# Patient Record
Sex: FEMALE | Race: BLACK OR AFRICAN AMERICAN | Employment: FULL TIME | URBAN - METROPOLITAN AREA
[De-identification: names, ages, dates, MRNs, and addresses within clinical notes are randomized per-mention and may not be internally consistent; named-entity substitution may affect disease eponyms.]

---

## 2017-07-13 ENCOUNTER — OFFICE VISIT (OUTPATIENT)
Dept: ORTHOPEDIC SURGERY | Age: 37
End: 2017-07-13

## 2017-07-13 VITALS
TEMPERATURE: 97.3 F | BODY MASS INDEX: 35 KG/M2 | DIASTOLIC BLOOD PRESSURE: 82 MMHG | WEIGHT: 223 LBS | HEIGHT: 67 IN | HEART RATE: 90 BPM | SYSTOLIC BLOOD PRESSURE: 112 MMHG

## 2017-07-13 DIAGNOSIS — M25.561 RIGHT KNEE PAIN, UNSPECIFIED CHRONICITY: Primary | ICD-10-CM

## 2017-07-13 DIAGNOSIS — M17.11 ARTHRITIS OF RIGHT KNEE: ICD-10-CM

## 2017-07-13 PROCEDURE — 20610 DRAIN/INJ JOINT/BURSA W/O US: CPT | Performed by: PHYSICIAN ASSISTANT

## 2017-07-13 PROCEDURE — 73560 X-RAY EXAM OF KNEE 1 OR 2: CPT | Performed by: PHYSICIAN ASSISTANT

## 2017-07-13 PROCEDURE — 99202 OFFICE O/P NEW SF 15 MIN: CPT | Performed by: PHYSICIAN ASSISTANT

## 2017-07-18 PROBLEM — M17.11 ARTHRITIS OF RIGHT KNEE: Status: ACTIVE | Noted: 2017-07-18

## 2017-08-16 ENCOUNTER — OFFICE VISIT (OUTPATIENT)
Dept: ORTHOPEDIC SURGERY | Age: 37
End: 2017-08-16

## 2017-08-16 VITALS
DIASTOLIC BLOOD PRESSURE: 85 MMHG | HEART RATE: 77 BPM | WEIGHT: 223 LBS | HEIGHT: 67 IN | BODY MASS INDEX: 35 KG/M2 | SYSTOLIC BLOOD PRESSURE: 119 MMHG

## 2017-08-16 DIAGNOSIS — M17.11 ARTHRITIS OF RIGHT KNEE: ICD-10-CM

## 2017-08-16 DIAGNOSIS — M17.12 ARTHRITIS OF LEFT KNEE: Primary | ICD-10-CM

## 2017-08-16 PROCEDURE — 99213 OFFICE O/P EST LOW 20 MIN: CPT | Performed by: PHYSICIAN ASSISTANT

## 2017-08-16 PROCEDURE — 20610 DRAIN/INJ JOINT/BURSA W/O US: CPT | Performed by: PHYSICIAN ASSISTANT

## 2017-09-14 ENCOUNTER — OFFICE VISIT (OUTPATIENT)
Dept: ORTHOPEDIC SURGERY | Age: 37
End: 2017-09-14

## 2017-09-14 VITALS
HEIGHT: 67 IN | TEMPERATURE: 98 F | SYSTOLIC BLOOD PRESSURE: 131 MMHG | BODY MASS INDEX: 35 KG/M2 | HEART RATE: 91 BPM | DIASTOLIC BLOOD PRESSURE: 81 MMHG | WEIGHT: 223 LBS

## 2017-09-14 DIAGNOSIS — M17.11 ARTHRITIS OF RIGHT KNEE: Primary | ICD-10-CM

## 2017-09-14 PROCEDURE — 20610 DRAIN/INJ JOINT/BURSA W/O US: CPT | Performed by: PHYSICIAN ASSISTANT

## 2019-02-05 ENCOUNTER — HOSPITAL ENCOUNTER (EMERGENCY)
Age: 39
Discharge: HOME | End: 2019-02-05
Payer: COMMERCIAL

## 2019-02-05 VITALS
SYSTOLIC BLOOD PRESSURE: 125 MMHG | HEART RATE: 95 BPM | TEMPERATURE: 99.9 F | OXYGEN SATURATION: 100 % | DIASTOLIC BLOOD PRESSURE: 77 MMHG | WEIGHT: 224.21 LBS | HEIGHT: 67 IN | RESPIRATION RATE: 18 BRPM | BODY MASS INDEX: 35.19 KG/M2

## 2019-02-05 DIAGNOSIS — J06.9 VIRAL URI: Primary | ICD-10-CM

## 2019-02-05 LAB — S PYO AG THROAT QL: NEGATIVE

## 2019-02-05 PROCEDURE — 87880 STREP A ASSAY W/OPTIC: CPT

## 2019-02-05 PROCEDURE — 99283 EMERGENCY DEPT VISIT LOW MDM: CPT

## 2019-02-05 PROCEDURE — 87081 CULTURE SCREEN ONLY: CPT

## 2019-02-05 RX ORDER — IBUPROFEN 600 MG/1
600 TABLET ORAL EVERY 6 HOURS PRN
Qty: 20 TABLET | Refills: 0 | Status: SHIPPED | OUTPATIENT
Start: 2019-02-05 | End: 2019-10-17

## 2019-02-05 RX ORDER — PSEUDOEPHEDRINE HYDROCHLORIDE 30 MG/1
60 TABLET ORAL EVERY 6 HOURS PRN
Qty: 30 TABLET | Refills: 0 | Status: SHIPPED | OUTPATIENT
Start: 2019-02-05 | End: 2019-10-17

## 2019-02-05 RX ORDER — ACETAMINOPHEN 500 MG
1000 TABLET ORAL EVERY 6 HOURS PRN
Qty: 30 TABLET | Refills: 0 | Status: SHIPPED | OUTPATIENT
Start: 2019-02-05 | End: 2019-10-17

## 2019-02-05 RX ORDER — ATORVASTATIN CALCIUM 20 MG/1
20 TABLET, FILM COATED ORAL
COMMUNITY
Start: 2018-06-29 | End: 2019-10-17

## 2019-02-05 ASSESSMENT — PAIN DESCRIPTION - PAIN TYPE
TYPE: ACUTE PAIN
TYPE: ACUTE PAIN

## 2019-02-05 ASSESSMENT — PAIN - FUNCTIONAL ASSESSMENT: PAIN_FUNCTIONAL_ASSESSMENT: 0-10

## 2019-02-05 ASSESSMENT — PAIN DESCRIPTION - DESCRIPTORS
DESCRIPTORS: ACHING
DESCRIPTORS: ACHING

## 2019-02-05 ASSESSMENT — PAIN DESCRIPTION - LOCATION
LOCATION: GENERALIZED
LOCATION: GENERALIZED

## 2019-02-05 ASSESSMENT — PAIN SCALES - GENERAL
PAINLEVEL_OUTOF10: 8
PAINLEVEL_OUTOF10: 5

## 2019-02-07 LAB — S PYO THROAT QL CULT: NORMAL

## 2019-04-24 ENCOUNTER — HOSPITAL ENCOUNTER (EMERGENCY)
Age: 39
Discharge: HOME | End: 2019-04-25
Attending: EMERGENCY MEDICINE
Payer: COMMERCIAL

## 2019-04-24 DIAGNOSIS — L02.31 ABSCESS OF BUTTOCK: Primary | ICD-10-CM

## 2019-04-24 DIAGNOSIS — L03.317 CELLULITIS OF BUTTOCK: ICD-10-CM

## 2019-04-24 LAB
A/G RATIO: 0.9 (ref 1.1–2.2)
ALBUMIN SERPL-MCNC: 3.8 G/DL (ref 3.4–5)
ALP BLD-CCNC: 123 U/L (ref 40–129)
ALT SERPL-CCNC: 15 U/L (ref 10–40)
ANION GAP SERPL CALCULATED.3IONS-SCNC: 11 MMOL/L (ref 3–16)
AST SERPL-CCNC: 16 U/L (ref 15–37)
BASOPHILS ABSOLUTE: 0.1 K/UL (ref 0–0.2)
BASOPHILS RELATIVE PERCENT: 0.7 %
BILIRUB SERPL-MCNC: <0.2 MG/DL (ref 0–1)
BUN BLDV-MCNC: 15 MG/DL (ref 7–20)
CALCIUM SERPL-MCNC: 9 MG/DL (ref 8.3–10.6)
CHLORIDE BLD-SCNC: 102 MMOL/L (ref 99–110)
CO2: 23 MMOL/L (ref 21–32)
CREAT SERPL-MCNC: 0.8 MG/DL (ref 0.6–1.1)
EOSINOPHILS ABSOLUTE: 0.3 K/UL (ref 0–0.6)
EOSINOPHILS RELATIVE PERCENT: 1.9 %
GFR AFRICAN AMERICAN: >60
GFR NON-AFRICAN AMERICAN: >60
GLOBULIN: 4.1 G/DL
GLUCOSE BLD-MCNC: 100 MG/DL (ref 70–99)
HCT VFR BLD CALC: 35.8 % (ref 36–48)
HEMOGLOBIN: 11.7 G/DL (ref 12–16)
LACTIC ACID: 1.1 MMOL/L (ref 0.4–2)
LYMPHOCYTES ABSOLUTE: 3.8 K/UL (ref 1–5.1)
LYMPHOCYTES RELATIVE PERCENT: 21.7 %
MCH RBC QN AUTO: 31.3 PG (ref 26–34)
MCHC RBC AUTO-ENTMCNC: 32.7 G/DL (ref 31–36)
MCV RBC AUTO: 95.8 FL (ref 80–100)
MONOCYTES ABSOLUTE: 1 K/UL (ref 0–1.3)
MONOCYTES RELATIVE PERCENT: 5.7 %
NEUTROPHILS ABSOLUTE: 12.3 K/UL (ref 1.7–7.7)
NEUTROPHILS RELATIVE PERCENT: 70 %
PDW BLD-RTO: 12.4 % (ref 12.4–15.4)
PLATELET # BLD: ABNORMAL K/UL (ref 135–450)
PMV BLD AUTO: ABNORMAL FL (ref 5–10.5)
POTASSIUM REFLEX MAGNESIUM: 3.7 MMOL/L (ref 3.5–5.1)
RBC # BLD: 3.73 M/UL (ref 4–5.2)
SODIUM BLD-SCNC: 136 MMOL/L (ref 136–145)
TOTAL PROTEIN: 7.9 G/DL (ref 6.4–8.2)
WBC # BLD: 17.5 K/UL (ref 4–11)

## 2019-04-24 PROCEDURE — 87070 CULTURE OTHR SPECIMN AEROBIC: CPT

## 2019-04-24 PROCEDURE — 36415 COLL VENOUS BLD VENIPUNCTURE: CPT

## 2019-04-24 PROCEDURE — 87040 BLOOD CULTURE FOR BACTERIA: CPT

## 2019-04-24 PROCEDURE — 83605 ASSAY OF LACTIC ACID: CPT

## 2019-04-24 PROCEDURE — 96361 HYDRATE IV INFUSION ADD-ON: CPT

## 2019-04-24 PROCEDURE — 87205 SMEAR GRAM STAIN: CPT

## 2019-04-24 PROCEDURE — 96374 THER/PROPH/DIAG INJ IV PUSH: CPT

## 2019-04-24 PROCEDURE — 2580000003 HC RX 258: Performed by: NURSE PRACTITIONER

## 2019-04-24 PROCEDURE — 85025 COMPLETE CBC W/AUTO DIFF WBC: CPT

## 2019-04-24 PROCEDURE — 6360000002 HC RX W HCPCS: Performed by: NURSE PRACTITIONER

## 2019-04-24 PROCEDURE — 99283 EMERGENCY DEPT VISIT LOW MDM: CPT

## 2019-04-24 PROCEDURE — 4500000023 HC ED LEVEL 3 PROCEDURE

## 2019-04-24 PROCEDURE — 87801 DETECT AGNT MULT DNA AMPLI: CPT

## 2019-04-24 PROCEDURE — 80053 COMPREHEN METABOLIC PANEL: CPT

## 2019-04-24 PROCEDURE — 96375 TX/PRO/DX INJ NEW DRUG ADDON: CPT

## 2019-04-24 RX ORDER — ONDANSETRON 2 MG/ML
4 INJECTION INTRAMUSCULAR; INTRAVENOUS ONCE
Status: COMPLETED | OUTPATIENT
Start: 2019-04-24 | End: 2019-04-24

## 2019-04-24 RX ORDER — 0.9 % SODIUM CHLORIDE 0.9 %
1000 INTRAVENOUS SOLUTION INTRAVENOUS ONCE
Status: COMPLETED | OUTPATIENT
Start: 2019-04-24 | End: 2019-04-24

## 2019-04-24 RX ORDER — CEPHALEXIN 250 MG/1
500 CAPSULE ORAL ONCE
Status: COMPLETED | OUTPATIENT
Start: 2019-04-24 | End: 2019-04-25

## 2019-04-24 RX ORDER — CEPHALEXIN 500 MG/1
500 CAPSULE ORAL 4 TIMES DAILY
Qty: 28 CAPSULE | Refills: 0 | Status: SHIPPED | OUTPATIENT
Start: 2019-04-24 | End: 2019-05-01

## 2019-04-24 RX ORDER — MORPHINE SULFATE 4 MG/ML
4 INJECTION, SOLUTION INTRAMUSCULAR; INTRAVENOUS ONCE
Status: COMPLETED | OUTPATIENT
Start: 2019-04-24 | End: 2019-04-24

## 2019-04-24 RX ORDER — SULFAMETHOXAZOLE AND TRIMETHOPRIM 800; 160 MG/1; MG/1
1 TABLET ORAL 2 TIMES DAILY
Qty: 14 TABLET | Refills: 0 | Status: SHIPPED | OUTPATIENT
Start: 2019-04-24 | End: 2019-05-01

## 2019-04-24 RX ORDER — NAPROXEN 500 MG/1
500 TABLET ORAL 2 TIMES DAILY
Qty: 60 TABLET | Refills: 0 | Status: SHIPPED | OUTPATIENT
Start: 2019-04-24 | End: 2020-02-17 | Stop reason: SDUPTHER

## 2019-04-24 RX ORDER — SULFAMETHOXAZOLE AND TRIMETHOPRIM 800; 160 MG/1; MG/1
1 TABLET ORAL ONCE
Status: COMPLETED | OUTPATIENT
Start: 2019-04-24 | End: 2019-04-25

## 2019-04-24 RX ADMIN — SODIUM CHLORIDE 1000 ML: 9 INJECTION, SOLUTION INTRAVENOUS at 22:02

## 2019-04-24 RX ADMIN — ONDANSETRON 4 MG: 2 INJECTION INTRAMUSCULAR; INTRAVENOUS at 22:02

## 2019-04-24 RX ADMIN — HYDROMORPHONE HYDROCHLORIDE 1 MG: 1 INJECTION, SOLUTION INTRAMUSCULAR; INTRAVENOUS; SUBCUTANEOUS at 23:17

## 2019-04-24 RX ADMIN — MORPHINE SULFATE 4 MG: 4 INJECTION INTRAVENOUS at 22:02

## 2019-04-24 ASSESSMENT — PAIN SCALES - GENERAL
PAINLEVEL_OUTOF10: 10
PAINLEVEL_OUTOF10: 2
PAINLEVEL_OUTOF10: 2
PAINLEVEL_OUTOF10: 6

## 2019-04-24 ASSESSMENT — PAIN DESCRIPTION - FREQUENCY: FREQUENCY: CONTINUOUS

## 2019-04-24 ASSESSMENT — PAIN DESCRIPTION - LOCATION
LOCATION: BUTTOCKS

## 2019-04-24 ASSESSMENT — PAIN DESCRIPTION - ORIENTATION: ORIENTATION: RIGHT

## 2019-04-24 ASSESSMENT — ENCOUNTER SYMPTOMS
GASTROINTESTINAL NEGATIVE: 1
RESPIRATORY NEGATIVE: 1

## 2019-04-24 ASSESSMENT — PAIN DESCRIPTION - PAIN TYPE
TYPE: ACUTE PAIN

## 2019-04-24 ASSESSMENT — PAIN DESCRIPTION - PROGRESSION: CLINICAL_PROGRESSION: GRADUALLY WORSENING

## 2019-04-24 ASSESSMENT — PAIN DESCRIPTION - DESCRIPTORS
DESCRIPTORS: BURNING
DESCRIPTORS: SHARP

## 2019-04-25 VITALS
BODY MASS INDEX: 34.43 KG/M2 | OXYGEN SATURATION: 99 % | TEMPERATURE: 98.5 F | DIASTOLIC BLOOD PRESSURE: 66 MMHG | RESPIRATION RATE: 18 BRPM | HEART RATE: 90 BPM | SYSTOLIC BLOOD PRESSURE: 116 MMHG | WEIGHT: 219.8 LBS

## 2019-04-25 PROCEDURE — 6370000000 HC RX 637 (ALT 250 FOR IP): Performed by: NURSE PRACTITIONER

## 2019-04-25 RX ADMIN — SULFAMETHOXAZOLE AND TRIMETHOPRIM 1 TABLET: 800; 160 TABLET ORAL at 00:29

## 2019-04-25 RX ADMIN — CEPHALEXIN 500 MG: 250 CAPSULE ORAL at 00:29

## 2019-04-25 ASSESSMENT — PAIN SCALES - GENERAL: PAINLEVEL_OUTOF10: 2

## 2019-04-25 ASSESSMENT — PAIN - FUNCTIONAL ASSESSMENT: PAIN_FUNCTIONAL_ASSESSMENT: 0-10

## 2019-04-25 ASSESSMENT — PAIN DESCRIPTION - PAIN TYPE: TYPE: ACUTE PAIN

## 2019-04-25 NOTE — ED PROVIDER NOTES
Attending Supervisory Note/Shared Visit   I have personally performed a face to face diagnostic evaluation on this patient. I have reviewed the mid-levels findings and agree. History and Exam by me shows an anxious appearing female no acute distress. Patient has a history of recurrent abscesses and recently has been developing an abscess over the gluteal cleft. The patient denied constitutional symptoms. At that presentation vital signs within normal limits. Laboratory data demonstrates a little cytosis but no elevated lactate. Incision and drainage performed by the [facility] resulting in a large amount of purulent drainage. The wound was irrigated and the patient would not tolerate packing. Patient discharged in stable condition and given wound care instructions. I agree with the [facility] plan of care. Please [facility] Note for full ED course and final disposition. Disposition:  1. Abscess of buttock    2.  Cellulitis of buttock          Selam Iniguez MD  Attending Emergency Physician        Selam Iniguez MD  04/25/19 8891

## 2019-04-25 NOTE — ED NOTES
Pt administered Dilaudud IV after receiving numbing medication in abscess. Pt very tearful. C/o pain at 10/10.      Izzy Garces RN  04/24/19 9204

## 2019-04-27 LAB
GRAM STAIN RESULT: ABNORMAL
ORGANISM: ABNORMAL
REPORT: NORMAL
WOUND/ABSCESS: ABNORMAL
WOUND/ABSCESS: ABNORMAL

## 2019-04-27 NOTE — ED NOTES
Blood culture called in for gram + cocci and clusters, reviewed with Dr Mercy Hall patient discharged on appropriate antibiotics, no further action needed at this time.       Hellen Myles RN  04/27/19 9858

## 2019-04-29 LAB
CULTURE, BLOOD 2: ABNORMAL
CULTURE, BLOOD 2: ABNORMAL
ORGANISM: ABNORMAL

## 2019-04-30 LAB — BLOOD CULTURE, ROUTINE: NORMAL

## 2019-05-02 PROBLEM — M54.2 NECK PAIN: Status: ACTIVE | Noted: 2018-06-29

## 2019-06-03 ENCOUNTER — OFFICE VISIT (OUTPATIENT)
Dept: ORTHOPEDIC SURGERY | Age: 39
End: 2019-06-03
Payer: COMMERCIAL

## 2019-06-03 VITALS
BODY MASS INDEX: 34.5 KG/M2 | SYSTOLIC BLOOD PRESSURE: 134 MMHG | WEIGHT: 219.8 LBS | TEMPERATURE: 98.2 F | HEIGHT: 67 IN | RESPIRATION RATE: 16 BRPM | DIASTOLIC BLOOD PRESSURE: 84 MMHG | HEART RATE: 88 BPM

## 2019-06-03 DIAGNOSIS — M17.11 ARTHRITIS OF RIGHT KNEE: Primary | ICD-10-CM

## 2019-06-03 DIAGNOSIS — S83.001A PATELLAR SUBLUXATION, RIGHT, INITIAL ENCOUNTER: ICD-10-CM

## 2019-06-03 PROCEDURE — 99213 OFFICE O/P EST LOW 20 MIN: CPT | Performed by: PHYSICIAN ASSISTANT

## 2019-06-03 PROCEDURE — 4004F PT TOBACCO SCREEN RCVD TLK: CPT | Performed by: PHYSICIAN ASSISTANT

## 2019-06-03 PROCEDURE — G8427 DOCREV CUR MEDS BY ELIG CLIN: HCPCS | Performed by: PHYSICIAN ASSISTANT

## 2019-06-03 PROCEDURE — G8417 CALC BMI ABV UP PARAM F/U: HCPCS | Performed by: PHYSICIAN ASSISTANT

## 2019-06-04 NOTE — PROGRESS NOTES
Subjective:      Patient ID: Azael Perrin is a 45 y.o.  female. Chief Complaint   Patient presents with    Follow-up     R knee cortisone injection 9.14.17        HPI: She is here for follow-up on her right knee. She has patellofemoral arthritis. She was last evaluated and treated for this condition back in September 2017. At that time she underwent a cortisone injection with mild very temporary relief. Over the course of last one and half years she's had progressively worsening right knee pain. All of her pain is over the anterior aspect of the knee. Pain is worse with going up or down steps. Less pain at rest.  Treatment has consisted of one prior cortisone injection, anti-inflammatory medications on a p.r.n. basis. This provides very little benefit. Review of Systems:   Negative for fever or chills. A 14 point review of systems and history form completed by the patient has been reviewed. This form is scanned in the media tab of the patient's chart under today's date. Past Medical History:   Diagnosis Date    Arthritis     Asthma     Hyperlipidemia        Family History   Problem Relation Age of Onset    High Blood Pressure Mother     High Cholesterol Mother     Diabetes Father        Past Surgical History:   Procedure Laterality Date    APPENDECTOMY      HYSTERECTOMY      TUBAL LIGATION         Social History     Occupational History    Not on file   Tobacco Use    Smoking status: Current Every Day Smoker     Packs/day: 0.25     Years: 9.00     Pack years: 2.25     Types: Cigarettes    Smokeless tobacco: Never Used   Substance and Sexual Activity    Alcohol use:  Yes    Drug use: No    Sexual activity: Not Currently       Current Outpatient Medications   Medication Sig Dispense Refill    naproxen (NAPROSYN) 500 MG tablet Take 1 tablet by mouth 2 times daily 60 tablet 0    atorvastatin (LIPITOR) 20 MG tablet Take 20 mg by mouth      ibuprofen (ADVIL;MOTRIN) 600 MG tablet Take 1 tablet by mouth every 6 hours as needed for Pain 20 tablet 0    acetaminophen (APAP EXTRA STRENGTH) 500 MG tablet Take 2 tablets by mouth every 6 hours as needed for Pain DO NOT TAKE WITH OTHER MEDICATIONS CONTAINING ACETAMINOPHEN. 30 tablet 0    Magic Mouthwash (MIRACLE MOUTHWASH) Swish and spit 5 mLs 4 times daily as needed for Pain Equal parts 2% lidocaine, dIphenhydramine, antacid. 240 mL 0    pseudoephedrine (DECONGESTANT) 30 MG tablet Take 2 tablets by mouth every 6 hours as needed for Congestion 30 tablet 0     No current facility-administered medications for this visit. Objective:   She is alert, oriented x 3, pleasant, well nourished, developed and in no acute distress. /84   Pulse 88   Temp 98.2 °F (36.8 °C) (Tympanic)   Resp 16   Ht 5' 7\" (1.702 m)   Wt 219 lb 12.8 oz (99.7 kg)   LMP 11/11/2015 (Approximate)   BMI 34.43 kg/m²      Examination of the right knee: The alignment of the knee is neutral.   There is not erythema. There no soft tissue swelling. There is mild effusion. ROM-  Extension 0          -   Flexion  135   There moderate pain associated with ROM testing. Medial joint line is somewhat tender to palpation. Lateral joint line is somewhat tender to palpation. Retro patellar crepitus is present. There is is not crepitus along the joint line with ROM testing. Varus Stress testing does not produce pain,                                     does not show laxity. Valgus Stress testing does not produce pain,                                       does not show laxity. Lachman's test- negative. Anterior Drawer test- negative. Posterior Drawer test- negative. Darius's Test- negative. Patellar Compression testing does produce pain. Extensor Mechanism is  intact. Examination of the lower extremities are intact with sensation to light touch. Motor testing  5/5 in all major motor groups of the lower extremities.   Gait is normal heel to toe.  Gait is antalgic. Negative Sweeney's Sign. SLR negative. Examination of the lower extremities shows intact perfusion to all extremities. No cyanosis. Digits are warm to touch, capillary refill is less than 2 seconds. There is no edema noted. Examination of the skin over both lower extremities reveals: The skin to be intact without lacerations or abrasions. No significant erythema. No rashes or skin lesions. X Rays: performed in the office today:   AP Standing, Lateral and Sunrise views of right knee: There is moderate osteoarthritic findings of the right knee noted with grade 2 arthritic changes within the patellofemoral compartment, lateral facet of the patella and lateral femoral condyle with mild patellar subluxation. Medial compartment as well as lateral compartment shows grade 1 arthritic changes. No acute fractures or dislocations noted. Diagnosis:        ICD-10-CM    1. Arthritis of right knee, patellofemoral M17.11 XR KNEE RIGHT (3 VIEWS)     Ambulatory referral to Physical Therapy   2. Patellar subluxation, right, initial encounter S83.001A Ambulatory referral to Physical Therapy     CANCELED: Breg / DJO Hinged Lateral Stabilizer Brace        Assessment/ Plan:      She has a mild to moderate arthritis of the right knee within the patellofemoral compartment. At this time she is not interested in a repeat cortisone injection. The natural history of the patient's diagnosis as well as the treatment options were discussed in full and questions were answered. Risks and benefits of the treatment options also reviewed in detail. Rest, Ice, Compression and Elevation  OTC NSAID'S discussed to be taken in appropriate  therapeutic doses. Activity restriction/ Modification discussed. The patient was advised that NSAID-type medications have two very important potential side effects: gastrointestinal irritation including hemorrhage and renal injuries.  She was asked to take the medication with food and to stop if she experiences any GI upset. I asked her to call for vomiting, abdominal pain or black/bloody stools. He should have renal function testing per his medical provider periodically. The patient expresses understanding of these issues and questions were answered. She was fitted with a J brace. However, this seemed to cause more anterior knee discomfort. Therefore, she did not take the brace today. A prescription for physical therapy or VMO strengthening was provided today. Follow Up: 8 weeks  Call or return to clinic prn if these symptoms worsen or fail to improve as anticipated.

## 2019-06-11 ENCOUNTER — HOSPITAL ENCOUNTER (OUTPATIENT)
Dept: PHYSICAL THERAPY | Age: 39
Setting detail: THERAPIES SERIES
Discharge: HOME | End: 2019-06-11
Payer: COMMERCIAL

## 2019-06-11 ENCOUNTER — TELEPHONE (OUTPATIENT)
Dept: ORTHOPEDIC SURGERY | Age: 39
End: 2019-06-11

## 2019-06-11 PROCEDURE — 97530 THERAPEUTIC ACTIVITIES: CPT

## 2019-06-11 PROCEDURE — 97110 THERAPEUTIC EXERCISES: CPT

## 2019-06-11 PROCEDURE — 97162 PT EVAL MOD COMPLEX 30 MIN: CPT

## 2019-06-11 NOTE — PROGRESS NOTES
Apartment(second floor of two family, her bedroom is on the third floor)  Home Layout: Multi-level  Bathroom Shower/Tub: Tub/Shower unit  Bathroom Toilet: Standard  Bathroom Accessibility: Accessible  Receives Help From: Family  ADL Assistance: Independent  Homemaking Assistance: Independent  Ambulation Assistance: Independent  Transfer Assistance: Independent  Active : Yes  Occupation: Full time employment([employer]  at [employer]. currently off. )  Type of occupation: lifting, squatting etc.    Objective     Observation/Palpation  Palpation: generally TTP right knee, quad tendon/ in fat pad, parapatellar region  Edema: see girth    AROM RLE (degrees)  RLE General AROM: -7 to 95 deg  AROM LLE (degrees)  LLE General AROM: -5 to 100 deg    Strength RLE  Comment: 4/5 quads and hamstrings  Strength LLE  Comment: 4/5 quads and hamstrings     Additional Measures  Flexibility: mod limited HS/ GSS  Girth: suprapatellar right 48 cm, left 47 cn.  inf fat pad right 43 cm, left 40 cm  Special Tests: decreased patellar mobility all directions  on right. increased thickening inferior fat pad. Balance  Standing - Static: Good  Standing - Dynamic: Good  Comments: good stability during functional transfers and gait       Assessment   Conditions Requiring Skilled Therapeutic Intervention  Body structures, Functions, Activity limitations: Decreased functional mobility ; Decreased ROM; Decreased strength;Decreased balance  Assessment: Pt with  history of patellofemoral pain, knee OA. Prior function - indep. Current function - off work.  difficulty with steps, adl  Treatment Diagnosis: Decreased functional mobility 2/2 right knee pain  Prognosis: Good  Decision Making: Medium Complexity  Patient Education: role of PT, anatomy and pathology  Barriers to Learning: none noted  REQUIRES PT FOLLOW UP: Yes  Treatment Initiated : POC  Activity Tolerance  Activity Tolerance: Patient limited by pain         Plan   Plan  Times per week: 2x/week x 8 weeks  Current Treatment Recommendations: Strengthening, ROM, Neuromuscular Re-education, Pain Management, Home Exercise Program, Manual Therapy - Soft Tissue Mobilization, Modalities, Functional Mobility Training    OutComes Score  LEFS Total Score: 31 (06/11/19 1604)                 Goals  Long term goals  Time Frame for Long term goals : discharge  Long term goal 1: Decrease max pain to 3-4/10 on VAS to allow RTW  Long term goal 2: Increase strength to 4+/5 to allow normal gait and steps  Long term goal 3: Increase flexion by 10 degrees for more ease with dressing and self care.   Patient Goals   Patient goals : \"to avoid surgery\"       Timed Code Treatment Minutes:   45    Total Treatment Minutes:  [address], [id number]

## 2019-06-11 NOTE — TELEPHONE ENCOUNTER
Patient called states that she has left multiple msg w/ [facility] on [place] with no call back. She request that information be noted that she has made multiple attempts to schedule PT.     Questions call Patient:  [phone]

## 2019-06-11 NOTE — PLAN OF CARE
Outpatient Physical Therapy  [x] [facility]    Phone: [phone]   Fax: [phone]   [] [facility]  Phone: [phone]              Fax: [phone]  [] [facility]   Phone: [phone]   Fax: [phone]     To: Referring Practitioner: Trupti Moteresita      Patient: Nadeem Navarrete   : 1980   MRN: [id number]  Evaluation Date: 2019      Diagnosis Information:  · Diagnosis: Right knee patellofemoral OA   ·   Decreased functional mobility 2/2 right knee pain    Physical Therapy Certification  Dear Sofia Saldana  The following patient has been evaluated for physical therapy services and for therapy to continue, Medicare requires monthly physician review of the treatment plan. Please review the attached evaluation and/or summary of the patient's plan of care, and verify that you agree therapy should continue by signing the attached document and sending it back to our office. Plan of Care/Treatment to date:  [x] Therapeutic Exercise    [x] Modalities:  [x] Therapeutic Activity     [] Ultrasound  [] Electrical Stimulation  [] Gait Training      [] Cervical Traction [] Lumbar Traction  [] Neuromuscular Re-education    [] Cold/hotpack [] Iontophoresis   [x] Instruction in HEP     Other:  [x] Manual Therapy      []             [] Aquatic Therapy      []           ? Frequency/Duration:  # Days per week: [] 1 day # Weeks: [] 1 week [] 5 weeks     [x] 2 days? [] 2 weeks [] 6 weeks     [] 3 days   [] 3 weeks [] 7 weeks     [] 4 days   [] 4 weeks [x] 8 weeks    Rehab Potential: [] Excellent [x] Good [] Fair  [] Poor       Electronically signed by:  Maria A Balbuena, PT  [id number]      If you have any questions or concerns, please don't hesitate to call.   Thank you for your referral.      Physician Signature:________________________________Date:__________________  By signing above, therapists plan is approved by physician

## 2019-06-11 NOTE — FLOWSHEET NOTE
Physical Therapy Daily Treatment Note  Date:  2019    Patient Name:  Alvarado Brambila    :  1980  MRN: [id number]    Restrictions/Precautions: Position Activity Restriction  Other position/activity restrictions: high fall risk. fell two weeks ago descending steps. Catches herself. Pertinent Medical History: Additional Pertinent Hx: arthritis, asthma, HLD    Medical/Treatment Diagnosis Information:  · Diagnosis: Right knee patellofemoral OA  · Treatment Diagnosis: Decreased functional mobility 2/2 right knee pain    Insurance/Certification information:  PT Insurance Information: [facility]  Physician Information:  Referring Practitioner: Nirmala Mckeon of care signed (Y/N):  Sent     Visit# / total visits:    Pain level: 4-7/10     Functional Assessment:   Test:  Score:  eval  31    History of Injury:    History of anterior knee pain. 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 Had a cortisone Shot in 2017 with minimal relief. Tried a J brace this  in the office, but increased the pain. There is moderate osteoarthritic findings of the right knee noted with grade 2 arthritic changes within the patellofemoral compartment, lateral facet of the patella and lateral femoral condyle with mild patellar subluxation. Medial compartment as well as lateral compartment shows grade 1 arthritic changes.      Subjective:   General knee pain,  Parapatellar tenderness    Objective:   RLE AROM  -7 to 95 deg,  4/5 quads and HS,  Increased swelling, decreased patellar mobility      Exercise/Equipment Resistance/Repetitions Other comments     Right knee OA/ Patellofemoral OA   Nustep add    HSS 20 sec x 3    GSS incline add      fitter     add    Standing  Hip abd,  Ext with toe off   Add  Add   Add tband as gordon   gumdrops  add    Step ups 2\"  add Supine  Leg raises 4 way  SLR with ER   10 each  add   With difficulty   SAQ add          cryo 12 min   ADD PREMOD              Steps      Written HEP One at a time      HSS, SLR 4 way Reviewed- pt demo understanding     Other Therapeutic Activities:  Pt was educated on PT POC, Diagnosis, Prognosis, pathomechanics as well as frequency and duration of scheduling future physical therapy appointments. Time was also taken on this day to answer all patient questions and participation in PT. Reviewed appointment policy in detail with patient and patient verbalized understanding. Home Exercise Program:  Patient instructed in the following for HEP:   Patient verbalized/demonstrated understanding and was issued written HEP. Timed Code Treatment Minutes:  45    Total Treatment Minutes:  60    Treatment/Activity Tolerance:  [] Patient tolerated treatment well [] Patient limited by fatigue  [] Patient limited by pain  [] Patient limited by other medical complications  [] Other:     Prognosis: [x] Good [] Fair  [] Poor    Goals:    Long term goals  Time Frame for Long term goals : discharge  Long term goal 1: Decrease max pain to 3-4/10 on VAS to allow RTW  Long term goal 2: Increase strength to 4+/5 to allow normal gait and steps  Long term goal 3: Increase flexion by 10 degrees for more ease with dressing and self care.      Patient Requires Follow-up: [x] Yes  [] No    Plan:   [] Continue per plan of care [] Alter current plan (see comments)  [x] Plan of care initiated [] Hold pending MD visit [] Discharge    Plan for Next Session:  Add above as stated    Electronically signed by:  Rimma Mendoza, [facility] [address]

## 2019-06-14 ENCOUNTER — HOSPITAL ENCOUNTER (OUTPATIENT)
Dept: PHYSICAL THERAPY | Age: 39
Setting detail: THERAPIES SERIES
Discharge: HOME | End: 2019-06-14
Payer: COMMERCIAL

## 2019-06-14 PROCEDURE — 97110 THERAPEUTIC EXERCISES: CPT

## 2019-06-14 NOTE — FLOWSHEET NOTE
Physical Therapy Daily Treatment Note  Date:  2019    Patient Name:  Izzy Ortiz    :  1980  MRN: [id number]    Restrictions/Precautions: Position Activity Restriction  Other position/activity restrictions: high fall risk. fell two weeks ago descending steps. Catches herself. Pertinent Medical History: Additional Pertinent Hx: arthritis, asthma, HLD    Medical/Treatment Diagnosis Information:  · Diagnosis: Right knee patellofemoral OA  · Treatment Diagnosis: Decreased functional mobility 2/2 right knee pain    Insurance/Certification information:  PT Insurance Information: [facility]  Physician Information:  Referring Practitioner: Trinh Zee of care signed (Y/N):  YES    Visit# / total visits:    Pain level: 4-7/10     Functional Assessment:   Test:  Score:  eval  31    History of Injury:    History of anterior knee pain. 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 Had a cortisone Shot in 2017 with minimal relief. Tried a J brace this  in the office, but increased the pain. There is moderate osteoarthritic findings of the right knee noted with grade 2 arthritic changes within the patellofemoral compartment, lateral facet of the patella and lateral femoral condyle with mild patellar subluxation. Medial compartment as well as lateral compartment shows grade 1 arthritic changes.      Subjective:   General knee pain,  Parapatellar tenderness    Objective:   RLE AROM  -7 to 95 deg,  4/5 quads and HS,  Increased swelling, decreased patellar mobility      Exercise/Equipment Resistance/Repetitions Other comments     Right knee OA/ Patellofemoral OA   Nustep  Seat 10 UE 8  5 min level 0    HSS 20 sec x 3    GSS incline 20 sec  X 3      fitter     add    Standing  Hip abd,  Ext with toe off   2 x 10 R/L  2 x 10 R/L   Add tband as gordon   gumdrops

## 2019-06-21 ENCOUNTER — HOSPITAL ENCOUNTER (OUTPATIENT)
Dept: PHYSICAL THERAPY | Age: 39
Setting detail: THERAPIES SERIES
Discharge: HOME | End: 2019-06-21
Payer: COMMERCIAL

## 2019-06-21 PROCEDURE — 97110 THERAPEUTIC EXERCISES: CPT

## 2019-06-21 NOTE — FLOWSHEET NOTE
Physical Therapy Daily Treatment Note  Date:  2019    Patient Name:  Hailey Foley    :  1980  MRN: [id number]    Restrictions/Precautions: Position Activity Restriction  Other position/activity restrictions: high fall risk. fell two weeks ago descending steps. Catches herself. Pertinent Medical History: Additional Pertinent Hx: arthritis, asthma, HLD    Medical/Treatment Diagnosis Information:  · Diagnosis: Right knee patellofemoral OA  · Treatment Diagnosis: Decreased functional mobility 2/2 right knee pain    Insurance/Certification information:  PT Insurance Information: [facility]  Physician Information:  Referring Practitioner: Brandon Jj of care signed (Y/N):  YES    Visit# / total visits:  3/16  Pain level: 4-7/10     Functional Assessment:   Test:  Score:  eval  31    History of Injury:    History of anterior knee pain. 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 Had a cortisone Shot in 2017 with minimal relief. Tried a J brace this  in the office, but increased the pain. There is moderate osteoarthritic findings of the right knee noted with grade 2 arthritic changes within the patellofemoral compartment, lateral facet of the patella and lateral femoral condyle with mild patellar subluxation. Medial compartment as well as lateral compartment shows grade 1 arthritic changes.      Subjective:   General knee pain,  Parapatellar tenderness    Objective:   RLE AROM  -7 to 95 deg,  4/5 quads and HS,  Increased swelling, decreased patellar mobility      Exercise/Equipment Resistance/Repetitions Other comments     Right knee OA/ Patellofemoral OA   Nustep  Seat 10 UE 8  5 min LE only    HSS 20 sec x 3    GSS incline 20 sec  X 3    TKE TB  fitter   add  F/b s/s 1 thin    Standing  Hip abd,  Ext with toe off   2 x 10 R/L  [place]  tb  2 x 10

## 2019-06-25 ENCOUNTER — HOSPITAL ENCOUNTER (OUTPATIENT)
Dept: PHYSICAL THERAPY | Age: 39
Setting detail: THERAPIES SERIES
Discharge: HOME | End: 2019-06-25
Payer: COMMERCIAL

## 2019-06-25 PROCEDURE — 97110 THERAPEUTIC EXERCISES: CPT

## 2019-07-02 ENCOUNTER — HOSPITAL ENCOUNTER (OUTPATIENT)
Dept: PHYSICAL THERAPY | Age: 39
Setting detail: THERAPIES SERIES
Discharge: HOME | End: 2019-07-02
Payer: COMMERCIAL

## 2019-07-02 PROCEDURE — 97110 THERAPEUTIC EXERCISES: CPT

## 2019-07-05 ENCOUNTER — HOSPITAL ENCOUNTER (OUTPATIENT)
Dept: PHYSICAL THERAPY | Age: 39
Setting detail: THERAPIES SERIES
Discharge: HOME | End: 2019-07-05
Payer: COMMERCIAL

## 2019-07-05 PROCEDURE — 97110 THERAPEUTIC EXERCISES: CPT

## 2019-07-08 ENCOUNTER — HOSPITAL ENCOUNTER (OUTPATIENT)
Dept: PHYSICAL THERAPY | Age: 39
Setting detail: THERAPIES SERIES
Discharge: HOME | End: 2019-07-08
Payer: COMMERCIAL

## 2019-07-08 PROCEDURE — 97110 THERAPEUTIC EXERCISES: CPT

## 2019-07-08 NOTE — FLOWSHEET NOTE
Physical Therapy Daily Treatment Note  Date:  2019    Patient Name:  Carol Garcia    :  1980  MRN: [id number]    Restrictions/Precautions: Position Activity Restriction  Other position/activity restrictions: high fall risk. fell two weeks ago descending steps. Catches herself. Pertinent Medical History: Additional Pertinent Hx: arthritis, asthma, HLD    Medical/Treatment Diagnosis Information:  · Diagnosis: Right knee patellofemoral OA  · Treatment Diagnosis: Decreased functional mobility 2/2 right knee pain    Insurance/Certification information:  PT Insurance Information: [facility]  Physician Information:  Referring Practitioner: Celsa Baeza F/U 19  Plan of care signed (Y/N):  YES    Visit# / total visits:    Pain level: 0/10     Functional Assessment:   Test:  Score:  eval  31    History of Injury:    History of anterior knee pain. 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 Had a cortisone Shot in 2017 with minimal relief. Tried a J brace this  in the office, but increased the pain. There is moderate osteoarthritic findings of the right knee noted with grade 2 arthritic changes within the patellofemoral compartment, lateral facet of the patella and lateral femoral condyle with mild patellar subluxation. Medial compartment as well as lateral compartment shows grade 1 arthritic changes. Subjective:  No complaint of pain, just pressure that is decreased. Feels therapy helpful.      Objective:   RLE AROM  -7 to 95 deg,  4/5 quads and HS,  Increased swelling, decreased patellar mobility      Exercise/Equipment Resistance/Repetitions Other comments     Right knee OA/ Patellofemoral OA   Nustep  Seat 10 UE 8  5 min LE only    HSS 20 sec x 3    GSS incline 20 sec  X 3    TKE TB  fitter    Step ups  Step downs      Leg Press  Knee care initiated [] Hold pending MD visit [] Discharge    Plan for Next Session:  Add above as stated, pt to call to move apt with MD sooner for possible RTW    Electronically signed by:  Loyd Scott,

## 2019-07-10 ENCOUNTER — TELEPHONE (OUTPATIENT)
Dept: ORTHOPEDIC SURGERY | Age: 39
End: 2019-07-10

## 2019-07-10 NOTE — LETTER
[facility]  [address] [facility] [address]  Phone: [phone]  Fax: [place], [place]        July 10, 2019     Patient: Grady Paris   YOB: 1980   Date of Visit: 7/10/2019       To Whom It May Concern: It is my medical opinion that Phill Cooper may return to full duty immediately with no restrictions. If you have any questions or concerns, please don't hesitate to call.     Sincerely,          VIDYA Galan

## 2019-07-12 ENCOUNTER — HOSPITAL ENCOUNTER (OUTPATIENT)
Dept: PHYSICAL THERAPY | Age: 39
Setting detail: THERAPIES SERIES
Discharge: HOME | End: 2019-07-12
Payer: COMMERCIAL

## 2019-07-12 PROCEDURE — 97110 THERAPEUTIC EXERCISES: CPT

## 2019-07-12 NOTE — FLOWSHEET NOTE
ext  Knee flexion Green  2 x 10 R  F/b,  s/s   1 thin  2 x 10 R    6\" 2  x 10 R  4\" x 10 R      40# 3 x 10 with ball  14# 2 x 10 Gus   14# 2 x 10 Gus                  MHE flexion/abd 20# 2 x 10 R/L    Sit to stand w/o UE X 4 Increase reps   BOSU 1 min stance    reebok      Mini squats  One leg min I squats S/S 1 min      10  X 7 R     mini              Supine  Leg raises 4 way  SLR with ER   2 x 10 2#   0# 2 x 10 R/L        SAQ 3# 2 x 10 gus           cryo 12 min                 Steps      Written HEP One at a time      HSS, SLR 4 way  tband ext, abd, slr Reviewed- pt demo understanding     Other Therapeutic Activities:  Pt was educated on PT POC, Diagnosis, Prognosis, pathomechanics as well as frequency and duration of scheduling future physical therapy appointments. Time was also taken on this day to answer all patient questions and participation in PT. Reviewed appointment policy in detail with patient and patient verbalized understanding. Home Exercise Program:  Patient instructed in the following for HEP:   Patient verbalized/demonstrated understanding and was issued written HEP. Timed Code Treatment Minutes:  35    Total Treatment Minutes:  47    Treatment/Activity Tolerance:  [] Patient tolerated treatment well [] Patient limited by fatigue  [] Patient limited by pain  [] Patient limited by other medical complications  [] Other:   Functional Progress - feels she is getting stronger, walking easier. Prognosis: [x] Good [] Fair  [] Poor    Goals:    Long term goals  Time Frame for Long term goals : discharge  Long term goal 1: Decrease max pain to 3-4/10 on VAS to allow RTW  Long term goal 2: Increase strength to 4+/5 to allow normal gait and steps  Long term goal 3: Increase flexion by 10 degrees for more ease with dressing and self care.      Patient Requires Follow-up: [x] Yes  [] No    Plan:   [x] Continue per plan of care [] Alter current plan (see comments)  [] Plan of care initiated [] Hold

## 2019-07-16 ENCOUNTER — HOSPITAL ENCOUNTER (OUTPATIENT)
Dept: PHYSICAL THERAPY | Age: 39
Setting detail: THERAPIES SERIES
Discharge: HOME | End: 2019-07-16
Payer: COMMERCIAL

## 2019-07-16 PROCEDURE — 97110 THERAPEUTIC EXERCISES: CPT

## 2019-07-17 ENCOUNTER — APPOINTMENT (OUTPATIENT)
Dept: PHYSICAL THERAPY | Age: 39
End: 2019-07-17
Payer: COMMERCIAL

## 2019-07-19 ENCOUNTER — HOSPITAL ENCOUNTER (OUTPATIENT)
Dept: PHYSICAL THERAPY | Age: 39
Setting detail: THERAPIES SERIES
Discharge: HOME | End: 2019-07-19
Payer: COMMERCIAL

## 2019-07-19 PROCEDURE — 97110 THERAPEUTIC EXERCISES: CPT

## 2019-08-13 ENCOUNTER — OFFICE VISIT (OUTPATIENT)
Dept: ORTHOPEDIC SURGERY | Age: 39
End: 2019-08-13
Payer: COMMERCIAL

## 2019-08-13 VITALS
HEART RATE: 85 BPM | SYSTOLIC BLOOD PRESSURE: 123 MMHG | WEIGHT: 219 LBS | DIASTOLIC BLOOD PRESSURE: 76 MMHG | BODY MASS INDEX: 34.37 KG/M2 | HEIGHT: 67 IN | TEMPERATURE: 97.5 F

## 2019-08-13 DIAGNOSIS — M17.11 ARTHRITIS OF RIGHT KNEE: Primary | ICD-10-CM

## 2019-08-13 PROCEDURE — G8427 DOCREV CUR MEDS BY ELIG CLIN: HCPCS | Performed by: PHYSICIAN ASSISTANT

## 2019-08-13 PROCEDURE — 99213 OFFICE O/P EST LOW 20 MIN: CPT | Performed by: PHYSICIAN ASSISTANT

## 2019-08-13 PROCEDURE — G8417 CALC BMI ABV UP PARAM F/U: HCPCS | Performed by: PHYSICIAN ASSISTANT

## 2019-08-13 PROCEDURE — 4004F PT TOBACCO SCREEN RCVD TLK: CPT | Performed by: PHYSICIAN ASSISTANT

## 2019-08-13 RX ORDER — CELECOXIB 200 MG/1
200 CAPSULE ORAL DAILY
Qty: 30 CAPSULE | Refills: 3 | Status: SHIPPED | OUTPATIENT
Start: 2019-08-13 | End: 2019-10-22

## 2019-10-17 ENCOUNTER — APPOINTMENT (OUTPATIENT)
Dept: CT IMAGING | Age: 39
End: 2019-10-17
Payer: COMMERCIAL

## 2019-10-17 ENCOUNTER — HOSPITAL ENCOUNTER (EMERGENCY)
Age: 39
Discharge: HOME | End: 2019-10-17
Attending: EMERGENCY MEDICINE
Payer: COMMERCIAL

## 2019-10-17 VITALS
SYSTOLIC BLOOD PRESSURE: 136 MMHG | TEMPERATURE: 98.7 F | BODY MASS INDEX: 35.02 KG/M2 | DIASTOLIC BLOOD PRESSURE: 89 MMHG | HEIGHT: 67 IN | OXYGEN SATURATION: 100 % | WEIGHT: 223.11 LBS | HEART RATE: 78 BPM | RESPIRATION RATE: 20 BRPM

## 2019-10-17 DIAGNOSIS — K80.20 CALCULUS OF GALLBLADDER WITHOUT CHOLECYSTITIS WITHOUT OBSTRUCTION: Primary | ICD-10-CM

## 2019-10-17 DIAGNOSIS — R11.2 NON-INTRACTABLE VOMITING WITH NAUSEA, UNSPECIFIED VOMITING TYPE: ICD-10-CM

## 2019-10-17 DIAGNOSIS — R59.0 INGUINAL LYMPHADENOPATHY: ICD-10-CM

## 2019-10-17 DIAGNOSIS — R10.84 GENERALIZED ABDOMINAL PAIN: ICD-10-CM

## 2019-10-17 LAB
A/G RATIO: 1 (ref 1.1–2.2)
ALBUMIN SERPL-MCNC: 4 G/DL (ref 3.4–5)
ALP BLD-CCNC: 104 U/L (ref 40–129)
ALT SERPL-CCNC: 15 U/L (ref 10–40)
ANION GAP SERPL CALCULATED.3IONS-SCNC: 9 MMOL/L (ref 3–16)
AST SERPL-CCNC: 15 U/L (ref 15–37)
BILIRUB SERPL-MCNC: <0.2 MG/DL (ref 0–1)
BILIRUBIN URINE: NEGATIVE
BLOOD, URINE: ABNORMAL
BUN BLDV-MCNC: 12 MG/DL (ref 7–20)
CALCIUM SERPL-MCNC: 9.3 MG/DL (ref 8.3–10.6)
CHLORIDE BLD-SCNC: 101 MMOL/L (ref 99–110)
CLARITY: CLEAR
CO2: 28 MMOL/L (ref 21–32)
COLOR: YELLOW
CREAT SERPL-MCNC: 0.8 MG/DL (ref 0.6–1.1)
EPITHELIAL CELLS, UA: ABNORMAL /HPF
GFR AFRICAN AMERICAN: >60
GFR NON-AFRICAN AMERICAN: >60
GLOBULIN: 4 G/DL
GLUCOSE BLD-MCNC: 133 MG/DL (ref 70–99)
GLUCOSE URINE: NEGATIVE MG/DL
HCT VFR BLD CALC: 37.7 % (ref 36–48)
HEMOGLOBIN: 12.7 G/DL (ref 12–16)
KETONES, URINE: NEGATIVE MG/DL
LEUKOCYTE ESTERASE, URINE: NEGATIVE
LIPASE: 29 U/L (ref 13–60)
MCH RBC QN AUTO: 32.6 PG (ref 26–34)
MCHC RBC AUTO-ENTMCNC: 33.8 G/DL (ref 31–36)
MCV RBC AUTO: 96.7 FL (ref 80–100)
MICROSCOPIC EXAMINATION: YES
MUCUS: ABNORMAL /LPF
NITRITE, URINE: NEGATIVE
PDW BLD-RTO: 13 % (ref 12.4–15.4)
PH UA: 6.5 (ref 5–8)
PLATELET # BLD: 209 K/UL (ref 135–450)
PMV BLD AUTO: 8.3 FL (ref 5–10.5)
POTASSIUM SERPL-SCNC: 3.9 MMOL/L (ref 3.5–5.1)
PROTEIN UA: NEGATIVE MG/DL
RBC # BLD: 3.9 M/UL (ref 4–5.2)
RBC UA: ABNORMAL /HPF (ref 0–2)
SODIUM BLD-SCNC: 138 MMOL/L (ref 136–145)
SPECIFIC GRAVITY UA: 1.01 (ref 1–1.03)
TOTAL PROTEIN: 8 G/DL (ref 6.4–8.2)
TRICHOMONAS: ABNORMAL /HPF
URINE REFLEX TO CULTURE: ABNORMAL
URINE TYPE: ABNORMAL
UROBILINOGEN, URINE: 0.2 E.U./DL
WBC # BLD: 10.5 K/UL (ref 4–11)
WBC UA: ABNORMAL /HPF (ref 0–5)

## 2019-10-17 PROCEDURE — 96374 THER/PROPH/DIAG INJ IV PUSH: CPT

## 2019-10-17 PROCEDURE — 81001 URINALYSIS AUTO W/SCOPE: CPT

## 2019-10-17 PROCEDURE — 6370000000 HC RX 637 (ALT 250 FOR IP): Performed by: EMERGENCY MEDICINE

## 2019-10-17 PROCEDURE — 99284 EMERGENCY DEPT VISIT MOD MDM: CPT

## 2019-10-17 PROCEDURE — 2580000003 HC RX 258: Performed by: EMERGENCY MEDICINE

## 2019-10-17 PROCEDURE — 36415 COLL VENOUS BLD VENIPUNCTURE: CPT

## 2019-10-17 PROCEDURE — 6360000004 HC RX CONTRAST MEDICATION: Performed by: EMERGENCY MEDICINE

## 2019-10-17 PROCEDURE — 6360000002 HC RX W HCPCS: Performed by: EMERGENCY MEDICINE

## 2019-10-17 PROCEDURE — 74177 CT ABD & PELVIS W/CONTRAST: CPT

## 2019-10-17 PROCEDURE — 2500000003 HC RX 250 WO HCPCS: Performed by: EMERGENCY MEDICINE

## 2019-10-17 PROCEDURE — 85027 COMPLETE CBC AUTOMATED: CPT

## 2019-10-17 PROCEDURE — 80053 COMPREHEN METABOLIC PANEL: CPT

## 2019-10-17 PROCEDURE — 96361 HYDRATE IV INFUSION ADD-ON: CPT

## 2019-10-17 PROCEDURE — 83690 ASSAY OF LIPASE: CPT

## 2019-10-17 PROCEDURE — 96375 TX/PRO/DX INJ NEW DRUG ADDON: CPT

## 2019-10-17 RX ORDER — DICYCLOMINE HYDROCHLORIDE 10 MG/1
10 CAPSULE ORAL 3 TIMES DAILY PRN
Qty: 30 CAPSULE | Refills: 0 | Status: SHIPPED | OUTPATIENT
Start: 2019-10-17 | End: 2019-10-25

## 2019-10-17 RX ORDER — SODIUM CHLORIDE 9 MG/ML
1000 INJECTION, SOLUTION INTRAVENOUS ONCE
Status: COMPLETED | OUTPATIENT
Start: 2019-10-17 | End: 2019-10-17

## 2019-10-17 RX ORDER — DICYCLOMINE HYDROCHLORIDE 10 MG/1
10 CAPSULE ORAL ONCE
Status: COMPLETED | OUTPATIENT
Start: 2019-10-17 | End: 2019-10-17

## 2019-10-17 RX ORDER — ONDANSETRON 4 MG/1
4 TABLET, ORALLY DISINTEGRATING ORAL EVERY 8 HOURS PRN
Qty: 20 TABLET | Refills: 0 | Status: SHIPPED | OUTPATIENT
Start: 2019-10-17 | End: 2022-08-25

## 2019-10-17 RX ORDER — ONDANSETRON 2 MG/ML
4 INJECTION INTRAMUSCULAR; INTRAVENOUS
Status: DISCONTINUED | OUTPATIENT
Start: 2019-10-17 | End: 2019-10-17 | Stop reason: HOSPADM

## 2019-10-17 RX ADMIN — FAMOTIDINE 20 MG: 10 INJECTION, SOLUTION INTRAVENOUS at 01:39

## 2019-10-17 RX ADMIN — SODIUM CHLORIDE 1000 ML: 9 INJECTION, SOLUTION INTRAVENOUS at 01:44

## 2019-10-17 RX ADMIN — ONDANSETRON 4 MG: 2 INJECTION INTRAMUSCULAR; INTRAVENOUS at 01:39

## 2019-10-17 RX ADMIN — DICYCLOMINE HYDROCHLORIDE 10 MG: 10 CAPSULE ORAL at 02:30

## 2019-10-17 RX ADMIN — IOVERSOL 100 ML: 678 INJECTION INTRA-ARTERIAL; INTRAVENOUS at 02:07

## 2019-10-17 ASSESSMENT — PAIN DESCRIPTION - DESCRIPTORS
DESCRIPTORS: ACHING
DESCRIPTORS: ACHING

## 2019-10-17 ASSESSMENT — PAIN DESCRIPTION - FREQUENCY: FREQUENCY: CONTINUOUS

## 2019-10-17 ASSESSMENT — PAIN DESCRIPTION - PAIN TYPE
TYPE: ACUTE PAIN
TYPE: ACUTE PAIN

## 2019-10-17 ASSESSMENT — PAIN DESCRIPTION - LOCATION
LOCATION: ABDOMEN
LOCATION: ABDOMEN

## 2019-10-17 ASSESSMENT — PAIN DESCRIPTION - ORIENTATION
ORIENTATION: LOWER
ORIENTATION: MID;LOWER

## 2019-10-17 ASSESSMENT — PAIN SCALES - GENERAL
PAINLEVEL_OUTOF10: 6
PAINLEVEL_OUTOF10: 3

## 2019-10-21 ENCOUNTER — OFFICE VISIT (OUTPATIENT)
Dept: SURGERY | Age: 39
End: 2019-10-21
Payer: COMMERCIAL

## 2019-10-21 VITALS
HEART RATE: 85 BPM | BODY MASS INDEX: 34.77 KG/M2 | SYSTOLIC BLOOD PRESSURE: 127 MMHG | DIASTOLIC BLOOD PRESSURE: 76 MMHG | WEIGHT: 222 LBS

## 2019-10-21 DIAGNOSIS — K80.20 SYMPTOMATIC CHOLELITHIASIS: Primary | ICD-10-CM

## 2019-10-21 PROCEDURE — G8417 CALC BMI ABV UP PARAM F/U: HCPCS | Performed by: SURGERY

## 2019-10-21 PROCEDURE — G8427 DOCREV CUR MEDS BY ELIG CLIN: HCPCS | Performed by: SURGERY

## 2019-10-21 PROCEDURE — 99203 OFFICE O/P NEW LOW 30 MIN: CPT | Performed by: SURGERY

## 2019-10-21 PROCEDURE — G8484 FLU IMMUNIZE NO ADMIN: HCPCS | Performed by: SURGERY

## 2019-10-21 PROCEDURE — 4004F PT TOBACCO SCREEN RCVD TLK: CPT | Performed by: SURGERY

## 2019-10-22 ENCOUNTER — TELEPHONE (OUTPATIENT)
Dept: SURGERY | Age: 39
End: 2019-10-22

## 2019-10-22 ENCOUNTER — HOSPITAL ENCOUNTER (INPATIENT)
Age: 39
LOS: 1 days | Discharge: HOME | DRG: 263 | End: 2019-10-23
Attending: SURGERY | Admitting: SURGERY
Payer: COMMERCIAL

## 2019-10-22 ENCOUNTER — ANESTHESIA EVENT (OUTPATIENT)
Dept: OPERATING ROOM | Age: 39
DRG: 263 | End: 2019-10-22
Payer: COMMERCIAL

## 2019-10-22 DIAGNOSIS — K80.20 SYMPTOMATIC CHOLELITHIASIS: ICD-10-CM

## 2019-10-22 DIAGNOSIS — K80.20 CALCULUS OF GALLBLADDER WITHOUT CHOLECYSTITIS WITHOUT OBSTRUCTION: ICD-10-CM

## 2019-10-22 DIAGNOSIS — R11.2 NON-INTRACTABLE VOMITING WITH NAUSEA, UNSPECIFIED VOMITING TYPE: ICD-10-CM

## 2019-10-22 DIAGNOSIS — R10.13 ABDOMINAL PAIN, EPIGASTRIC: Primary | ICD-10-CM

## 2019-10-22 DIAGNOSIS — K81.0 ACUTE CHOLECYSTITIS: ICD-10-CM

## 2019-10-22 PROBLEM — K81.9 CHOLECYSTITIS: Status: ACTIVE | Noted: 2019-10-22

## 2019-10-22 LAB
A/G RATIO: 1 (ref 1.1–2.2)
ALBUMIN SERPL-MCNC: 4.2 G/DL (ref 3.4–5)
ALP BLD-CCNC: 99 U/L (ref 40–129)
ALT SERPL-CCNC: 16 U/L (ref 10–40)
ANION GAP SERPL CALCULATED.3IONS-SCNC: 12 MMOL/L (ref 3–16)
AST SERPL-CCNC: 15 U/L (ref 15–37)
BASOPHILS ABSOLUTE: 0 K/UL (ref 0–0.2)
BASOPHILS RELATIVE PERCENT: 0.3 %
BILIRUB SERPL-MCNC: 0.3 MG/DL (ref 0–1)
BILIRUBIN URINE: NEGATIVE
BLOOD, URINE: ABNORMAL
BUN BLDV-MCNC: 10 MG/DL (ref 7–20)
CALCIUM SERPL-MCNC: 9.4 MG/DL (ref 8.3–10.6)
CHLORIDE BLD-SCNC: 97 MMOL/L (ref 99–110)
CLARITY: CLEAR
CO2: 25 MMOL/L (ref 21–32)
COLOR: YELLOW
CREAT SERPL-MCNC: 0.8 MG/DL (ref 0.6–1.1)
EOSINOPHILS ABSOLUTE: 0.2 K/UL (ref 0–0.6)
EOSINOPHILS RELATIVE PERCENT: 1.4 %
EPITHELIAL CELLS, UA: 1 /HPF (ref 0–5)
GFR AFRICAN AMERICAN: >60
GFR NON-AFRICAN AMERICAN: >60
GLOBULIN: 4.2 G/DL
GLUCOSE BLD-MCNC: 112 MG/DL (ref 70–99)
GLUCOSE URINE: NEGATIVE MG/DL
HCG QUALITATIVE: NEGATIVE
HCT VFR BLD CALC: 39.8 % (ref 36–48)
HEMOGLOBIN: 13.3 G/DL (ref 12–16)
HYALINE CASTS: 0 /LPF (ref 0–8)
KETONES, URINE: NEGATIVE MG/DL
LEUKOCYTE ESTERASE, URINE: NEGATIVE
LIPASE: 16 U/L (ref 13–60)
LYMPHOCYTES ABSOLUTE: 3.8 K/UL (ref 1–5.1)
LYMPHOCYTES RELATIVE PERCENT: 32.1 %
MCH RBC QN AUTO: 32.3 PG (ref 26–34)
MCHC RBC AUTO-ENTMCNC: 33.3 G/DL (ref 31–36)
MCV RBC AUTO: 96.8 FL (ref 80–100)
MICROSCOPIC EXAMINATION: YES
MONOCYTES ABSOLUTE: 0.8 K/UL (ref 0–1.3)
MONOCYTES RELATIVE PERCENT: 7.1 %
NEUTROPHILS ABSOLUTE: 7 K/UL (ref 1.7–7.7)
NEUTROPHILS RELATIVE PERCENT: 59.1 %
NITRITE, URINE: NEGATIVE
PDW BLD-RTO: 12.6 % (ref 12.4–15.4)
PH UA: 6.5 (ref 5–8)
PLATELET # BLD: 269 K/UL (ref 135–450)
PMV BLD AUTO: 8.4 FL (ref 5–10.5)
POTASSIUM SERPL-SCNC: 3.6 MMOL/L (ref 3.5–5.1)
PROTEIN UA: NEGATIVE MG/DL
RBC # BLD: 4.11 M/UL (ref 4–5.2)
RBC UA: 2 /HPF (ref 0–4)
SODIUM BLD-SCNC: 134 MMOL/L (ref 136–145)
SPECIFIC GRAVITY UA: 1.02 (ref 1–1.03)
TOTAL PROTEIN: 8.4 G/DL (ref 6.4–8.2)
URINE REFLEX TO CULTURE: ABNORMAL
URINE TYPE: ABNORMAL
UROBILINOGEN, URINE: 1 E.U./DL
WBC # BLD: 11.8 K/UL (ref 4–11)
WBC UA: 1 /HPF (ref 0–5)

## 2019-10-22 PROCEDURE — 80053 COMPREHEN METABOLIC PANEL: CPT

## 2019-10-22 PROCEDURE — 6360000002 HC RX W HCPCS: Performed by: SURGERY

## 2019-10-22 PROCEDURE — 96361 HYDRATE IV INFUSION ADD-ON: CPT

## 2019-10-22 PROCEDURE — 2580000003 HC RX 258: Performed by: PHYSICIAN ASSISTANT

## 2019-10-22 PROCEDURE — 85025 COMPLETE CBC W/AUTO DIFF WBC: CPT

## 2019-10-22 PROCEDURE — 84703 CHORIONIC GONADOTROPIN ASSAY: CPT

## 2019-10-22 PROCEDURE — 96375 TX/PRO/DX INJ NEW DRUG ADDON: CPT

## 2019-10-22 PROCEDURE — 0FT44ZZ RESECTION OF GALLBLADDER, PERCUTANEOUS ENDOSCOPIC APPROACH: ICD-10-PCS | Performed by: SURGERY

## 2019-10-22 PROCEDURE — 8E0W4CZ ROBOTIC ASSISTED PROCEDURE OF TRUNK REGION, PERCUTANEOUS ENDOSCOPIC APPROACH: ICD-10-PCS | Performed by: SURGERY

## 2019-10-22 PROCEDURE — BF101ZZ FLUOROSCOPY OF BILE DUCTS USING LOW OSMOLAR CONTRAST: ICD-10-PCS | Performed by: SURGERY

## 2019-10-22 PROCEDURE — 96374 THER/PROPH/DIAG INJ IV PUSH: CPT

## 2019-10-22 PROCEDURE — 6360000002 HC RX W HCPCS: Performed by: PHYSICIAN ASSISTANT

## 2019-10-22 PROCEDURE — 99285 EMERGENCY DEPT VISIT HI MDM: CPT

## 2019-10-22 PROCEDURE — 1200000000 HC SEMI PRIVATE

## 2019-10-22 PROCEDURE — 81001 URINALYSIS AUTO W/SCOPE: CPT

## 2019-10-22 PROCEDURE — 2580000003 HC RX 258: Performed by: SURGERY

## 2019-10-22 PROCEDURE — 83690 ASSAY OF LIPASE: CPT

## 2019-10-22 RX ORDER — CIPROFLOXACIN 2 MG/ML
400 INJECTION, SOLUTION INTRAVENOUS EVERY 12 HOURS
Status: DISCONTINUED | OUTPATIENT
Start: 2019-10-22 | End: 2019-10-23 | Stop reason: HOSPADM

## 2019-10-22 RX ORDER — 0.9 % SODIUM CHLORIDE 0.9 %
1000 INTRAVENOUS SOLUTION INTRAVENOUS ONCE
Status: COMPLETED | OUTPATIENT
Start: 2019-10-22 | End: 2019-10-22

## 2019-10-22 RX ORDER — SODIUM CHLORIDE 9 MG/ML
INJECTION, SOLUTION INTRAVENOUS CONTINUOUS
Status: DISCONTINUED | OUTPATIENT
Start: 2019-10-22 | End: 2019-10-23 | Stop reason: HOSPADM

## 2019-10-22 RX ORDER — SODIUM CHLORIDE 0.9 % (FLUSH) 0.9 %
10 SYRINGE (ML) INJECTION PRN
Status: DISCONTINUED | OUTPATIENT
Start: 2019-10-22 | End: 2019-10-23 | Stop reason: HOSPADM

## 2019-10-22 RX ORDER — ONDANSETRON 2 MG/ML
4 INJECTION INTRAMUSCULAR; INTRAVENOUS EVERY 6 HOURS PRN
Status: DISCONTINUED | OUTPATIENT
Start: 2019-10-22 | End: 2019-10-23 | Stop reason: HOSPADM

## 2019-10-22 RX ORDER — PAROXETINE 10 MG/1
10 TABLET, FILM COATED ORAL DAILY
Status: DISCONTINUED | OUTPATIENT
Start: 2019-10-23 | End: 2019-10-23 | Stop reason: HOSPADM

## 2019-10-22 RX ORDER — SODIUM CHLORIDE 0.9 % (FLUSH) 0.9 %
10 SYRINGE (ML) INJECTION EVERY 12 HOURS SCHEDULED
Status: DISCONTINUED | OUTPATIENT
Start: 2019-10-22 | End: 2019-10-23 | Stop reason: HOSPADM

## 2019-10-22 RX ORDER — OXYCODONE HYDROCHLORIDE AND ACETAMINOPHEN 5; 325 MG/1; MG/1
1 TABLET ORAL EVERY 4 HOURS PRN
Status: DISCONTINUED | OUTPATIENT
Start: 2019-10-22 | End: 2019-10-23 | Stop reason: HOSPADM

## 2019-10-22 RX ORDER — FLUTICASONE PROPIONATE 110 UG/1
1 AEROSOL, METERED RESPIRATORY (INHALATION) 2 TIMES DAILY
Status: DISCONTINUED | OUTPATIENT
Start: 2019-10-22 | End: 2019-10-23 | Stop reason: HOSPADM

## 2019-10-22 RX ORDER — MORPHINE SULFATE 2 MG/ML
2 INJECTION, SOLUTION INTRAMUSCULAR; INTRAVENOUS
Status: DISCONTINUED | OUTPATIENT
Start: 2019-10-22 | End: 2019-10-23 | Stop reason: HOSPADM

## 2019-10-22 RX ORDER — ALBUTEROL SULFATE 90 UG/1
2 AEROSOL, METERED RESPIRATORY (INHALATION) EVERY 6 HOURS PRN
Status: DISCONTINUED | OUTPATIENT
Start: 2019-10-22 | End: 2019-10-23 | Stop reason: HOSPADM

## 2019-10-22 RX ORDER — MORPHINE SULFATE 4 MG/ML
4 INJECTION, SOLUTION INTRAMUSCULAR; INTRAVENOUS
Status: DISCONTINUED | OUTPATIENT
Start: 2019-10-22 | End: 2019-10-23 | Stop reason: HOSPADM

## 2019-10-22 RX ORDER — ACETAMINOPHEN 325 MG/1
650 TABLET ORAL EVERY 4 HOURS PRN
Status: DISCONTINUED | OUTPATIENT
Start: 2019-10-22 | End: 2019-10-23 | Stop reason: HOSPADM

## 2019-10-22 RX ORDER — OXYCODONE HYDROCHLORIDE AND ACETAMINOPHEN 5; 325 MG/1; MG/1
2 TABLET ORAL EVERY 4 HOURS PRN
Status: DISCONTINUED | OUTPATIENT
Start: 2019-10-22 | End: 2019-10-23 | Stop reason: HOSPADM

## 2019-10-22 RX ORDER — MORPHINE SULFATE 4 MG/ML
4 INJECTION, SOLUTION INTRAMUSCULAR; INTRAVENOUS ONCE
Status: COMPLETED | OUTPATIENT
Start: 2019-10-22 | End: 2019-10-22

## 2019-10-22 RX ORDER — ONDANSETRON 2 MG/ML
4 INJECTION INTRAMUSCULAR; INTRAVENOUS ONCE
Status: COMPLETED | OUTPATIENT
Start: 2019-10-22 | End: 2019-10-22

## 2019-10-22 RX ADMIN — CIPROFLOXACIN 400 MG: 2 INJECTION, SOLUTION INTRAVENOUS at 22:22

## 2019-10-22 RX ADMIN — MORPHINE SULFATE 4 MG: 4 INJECTION, SOLUTION INTRAMUSCULAR; INTRAVENOUS at 19:59

## 2019-10-22 RX ADMIN — MORPHINE SULFATE 2 MG: 2 INJECTION, SOLUTION INTRAMUSCULAR; INTRAVENOUS at 23:50

## 2019-10-22 RX ADMIN — HYDROMORPHONE HYDROCHLORIDE 1 MG: 1 INJECTION, SOLUTION INTRAMUSCULAR; INTRAVENOUS; SUBCUTANEOUS at 20:25

## 2019-10-22 RX ADMIN — SODIUM CHLORIDE: 9 INJECTION, SOLUTION INTRAVENOUS at 22:22

## 2019-10-22 RX ADMIN — SODIUM CHLORIDE 1000 ML: 9 INJECTION, SOLUTION INTRAVENOUS at 19:59

## 2019-10-22 RX ADMIN — ONDANSETRON 4 MG: 2 INJECTION INTRAMUSCULAR; INTRAVENOUS at 19:59

## 2019-10-22 ASSESSMENT — PAIN DESCRIPTION - PROGRESSION
CLINICAL_PROGRESSION: NOT CHANGED
CLINICAL_PROGRESSION: NOT CHANGED
CLINICAL_PROGRESSION: RAPIDLY IMPROVING
CLINICAL_PROGRESSION: GRADUALLY WORSENING

## 2019-10-22 ASSESSMENT — PAIN SCALES - GENERAL
PAINLEVEL_OUTOF10: 10
PAINLEVEL_OUTOF10: 10
PAINLEVEL_OUTOF10: 4
PAINLEVEL_OUTOF10: 0
PAINLEVEL_OUTOF10: 4
PAINLEVEL_OUTOF10: 10

## 2019-10-22 ASSESSMENT — PAIN DESCRIPTION - ORIENTATION
ORIENTATION: RIGHT;UPPER
ORIENTATION: MID
ORIENTATION: RIGHT;UPPER
ORIENTATION: RIGHT;UPPER

## 2019-10-22 ASSESSMENT — ENCOUNTER SYMPTOMS
CONSTIPATION: 0
SHORTNESS OF BREATH: 0
ABDOMINAL PAIN: 1
VOMITING: 1
COLOR CHANGE: 0
DIARRHEA: 0
NAUSEA: 1
BACK PAIN: 0

## 2019-10-22 ASSESSMENT — PAIN DESCRIPTION - LOCATION
LOCATION: ABDOMEN

## 2019-10-22 ASSESSMENT — PAIN DESCRIPTION - DESCRIPTORS
DESCRIPTORS: CRAMPING
DESCRIPTORS: SHARP;CONSTANT

## 2019-10-22 ASSESSMENT — PAIN - FUNCTIONAL ASSESSMENT: PAIN_FUNCTIONAL_ASSESSMENT: ACTIVITIES ARE NOT PREVENTED

## 2019-10-22 ASSESSMENT — PAIN DESCRIPTION - ONSET
ONSET: ON-GOING
ONSET: ON-GOING

## 2019-10-22 ASSESSMENT — PAIN DESCRIPTION - FREQUENCY
FREQUENCY: CONTINUOUS
FREQUENCY: INTERMITTENT

## 2019-10-22 ASSESSMENT — PAIN DESCRIPTION - PAIN TYPE
TYPE: ACUTE PAIN
TYPE: ACUTE PAIN

## 2019-10-23 ENCOUNTER — ANESTHESIA (OUTPATIENT)
Dept: OPERATING ROOM | Age: 39
DRG: 263 | End: 2019-10-23
Payer: COMMERCIAL

## 2019-10-23 ENCOUNTER — APPOINTMENT (OUTPATIENT)
Dept: GENERAL RADIOLOGY | Age: 39
DRG: 263 | End: 2019-10-23
Payer: COMMERCIAL

## 2019-10-23 VITALS
HEART RATE: 90 BPM | TEMPERATURE: 98.5 F | BODY MASS INDEX: 34.53 KG/M2 | WEIGHT: 220.02 LBS | DIASTOLIC BLOOD PRESSURE: 81 MMHG | HEIGHT: 67 IN | OXYGEN SATURATION: 92 % | SYSTOLIC BLOOD PRESSURE: 130 MMHG | RESPIRATION RATE: 18 BRPM

## 2019-10-23 VITALS — SYSTOLIC BLOOD PRESSURE: 128 MMHG | OXYGEN SATURATION: 100 % | DIASTOLIC BLOOD PRESSURE: 77 MMHG | TEMPERATURE: 97.9 F

## 2019-10-23 PROCEDURE — 6360000002 HC RX W HCPCS: Performed by: SURGERY

## 2019-10-23 PROCEDURE — 99218 PR INITIAL OBSERVATION CARE/DAY 30 MINUTES: CPT | Performed by: SURGERY

## 2019-10-23 PROCEDURE — 7100000000 HC PACU RECOVERY - FIRST 15 MIN: Performed by: SURGERY

## 2019-10-23 PROCEDURE — 6360000004 HC RX CONTRAST MEDICATION: Performed by: SURGERY

## 2019-10-23 PROCEDURE — 3600000009 HC SURGERY ROBOT BASE: Performed by: SURGERY

## 2019-10-23 PROCEDURE — 2500000003 HC RX 250 WO HCPCS: Performed by: NURSE ANESTHETIST, CERTIFIED REGISTERED

## 2019-10-23 PROCEDURE — 3700000000 HC ANESTHESIA ATTENDED CARE: Performed by: SURGERY

## 2019-10-23 PROCEDURE — S2900 ROBOTIC SURGICAL SYSTEM: HCPCS | Performed by: SURGERY

## 2019-10-23 PROCEDURE — 74300 X-RAY BILE DUCTS/PANCREAS: CPT

## 2019-10-23 PROCEDURE — 2709999900 HC NON-CHARGEABLE SUPPLY: Performed by: SURGERY

## 2019-10-23 PROCEDURE — 2580000003 HC RX 258: Performed by: ANESTHESIOLOGY

## 2019-10-23 PROCEDURE — 2720000010 HC SURG SUPPLY STERILE: Performed by: SURGERY

## 2019-10-23 PROCEDURE — 94640 AIRWAY INHALATION TREATMENT: CPT

## 2019-10-23 PROCEDURE — 6370000000 HC RX 637 (ALT 250 FOR IP): Performed by: SURGERY

## 2019-10-23 PROCEDURE — 6360000002 HC RX W HCPCS: Performed by: ANESTHESIOLOGY

## 2019-10-23 PROCEDURE — 2580000003 HC RX 258: Performed by: SURGERY

## 2019-10-23 PROCEDURE — 3600000019 HC SURGERY ROBOT ADDTL 15MIN: Performed by: SURGERY

## 2019-10-23 PROCEDURE — 3700000001 HC ADD 15 MINUTES (ANESTHESIA): Performed by: SURGERY

## 2019-10-23 PROCEDURE — 88304 TISSUE EXAM BY PATHOLOGIST: CPT

## 2019-10-23 PROCEDURE — C1894 INTRO/SHEATH, NON-LASER: HCPCS | Performed by: SURGERY

## 2019-10-23 PROCEDURE — 6360000002 HC RX W HCPCS: Performed by: NURSE ANESTHETIST, CERTIFIED REGISTERED

## 2019-10-23 PROCEDURE — 94150 VITAL CAPACITY TEST: CPT

## 2019-10-23 PROCEDURE — APPSS15 APP SPLIT SHARED TIME 0-15 MINUTES: Performed by: NURSE PRACTITIONER

## 2019-10-23 PROCEDURE — 2500000003 HC RX 250 WO HCPCS: Performed by: SURGERY

## 2019-10-23 PROCEDURE — 47563 LAPARO CHOLECYSTECTOMY/GRAPH: CPT | Performed by: SURGERY

## 2019-10-23 PROCEDURE — 7100000001 HC PACU RECOVERY - ADDTL 15 MIN: Performed by: SURGERY

## 2019-10-23 RX ORDER — MIDAZOLAM HYDROCHLORIDE 1 MG/ML
INJECTION INTRAMUSCULAR; INTRAVENOUS PRN
Status: DISCONTINUED | OUTPATIENT
Start: 2019-10-23 | End: 2019-10-23 | Stop reason: SDUPTHER

## 2019-10-23 RX ORDER — OXYCODONE HYDROCHLORIDE AND ACETAMINOPHEN 5; 325 MG/1; MG/1
1 TABLET ORAL EVERY 4 HOURS PRN
Status: DISCONTINUED | OUTPATIENT
Start: 2019-10-23 | End: 2019-10-23 | Stop reason: SDUPTHER

## 2019-10-23 RX ORDER — FENTANYL CITRATE 50 UG/ML
25 INJECTION, SOLUTION INTRAMUSCULAR; INTRAVENOUS EVERY 5 MIN PRN
Status: DISCONTINUED | OUTPATIENT
Start: 2019-10-23 | End: 2019-10-23 | Stop reason: HOSPADM

## 2019-10-23 RX ORDER — BUPIVACAINE HYDROCHLORIDE 2.5 MG/ML
INJECTION, SOLUTION EPIDURAL; INFILTRATION; INTRACAUDAL
Status: COMPLETED | OUTPATIENT
Start: 2019-10-23 | End: 2019-10-23

## 2019-10-23 RX ORDER — DEXAMETHASONE SODIUM PHOSPHATE 4 MG/ML
INJECTION, SOLUTION INTRA-ARTICULAR; INTRALESIONAL; INTRAMUSCULAR; INTRAVENOUS; SOFT TISSUE PRN
Status: DISCONTINUED | OUTPATIENT
Start: 2019-10-23 | End: 2019-10-23 | Stop reason: SDUPTHER

## 2019-10-23 RX ORDER — SODIUM CHLORIDE 0.9 % (FLUSH) 0.9 %
10 SYRINGE (ML) INJECTION EVERY 12 HOURS SCHEDULED
Status: DISCONTINUED | OUTPATIENT
Start: 2019-10-23 | End: 2019-10-23 | Stop reason: HOSPADM

## 2019-10-23 RX ORDER — ONDANSETRON 2 MG/ML
4 INJECTION INTRAMUSCULAR; INTRAVENOUS
Status: DISCONTINUED | OUTPATIENT
Start: 2019-10-23 | End: 2019-10-23 | Stop reason: HOSPADM

## 2019-10-23 RX ORDER — OXYCODONE HYDROCHLORIDE AND ACETAMINOPHEN 5; 325 MG/1; MG/1
1-2 TABLET ORAL EVERY 6 HOURS PRN
Qty: 28 TABLET | Refills: 0 | Status: SHIPPED | OUTPATIENT
Start: 2019-10-23 | End: 2019-10-30

## 2019-10-23 RX ORDER — FENTANYL CITRATE 50 UG/ML
INJECTION, SOLUTION INTRAMUSCULAR; INTRAVENOUS PRN
Status: DISCONTINUED | OUTPATIENT
Start: 2019-10-23 | End: 2019-10-23 | Stop reason: SDUPTHER

## 2019-10-23 RX ORDER — LIDOCAINE HYDROCHLORIDE 20 MG/ML
INJECTION, SOLUTION EPIDURAL; INFILTRATION; INTRACAUDAL; PERINEURAL PRN
Status: DISCONTINUED | OUTPATIENT
Start: 2019-10-23 | End: 2019-10-23 | Stop reason: SDUPTHER

## 2019-10-23 RX ORDER — ONDANSETRON 2 MG/ML
INJECTION INTRAMUSCULAR; INTRAVENOUS PRN
Status: DISCONTINUED | OUTPATIENT
Start: 2019-10-23 | End: 2019-10-23 | Stop reason: SDUPTHER

## 2019-10-23 RX ORDER — PROPOFOL 10 MG/ML
INJECTION, EMULSION INTRAVENOUS PRN
Status: DISCONTINUED | OUTPATIENT
Start: 2019-10-23 | End: 2019-10-23 | Stop reason: SDUPTHER

## 2019-10-23 RX ORDER — SODIUM CHLORIDE 9 MG/ML
INJECTION, SOLUTION INTRAVENOUS CONTINUOUS
Status: DISCONTINUED | OUTPATIENT
Start: 2019-10-23 | End: 2019-10-23

## 2019-10-23 RX ORDER — ROCURONIUM BROMIDE 10 MG/ML
INJECTION, SOLUTION INTRAVENOUS PRN
Status: DISCONTINUED | OUTPATIENT
Start: 2019-10-23 | End: 2019-10-23 | Stop reason: SDUPTHER

## 2019-10-23 RX ORDER — SODIUM CHLORIDE 0.9 % (FLUSH) 0.9 %
10 SYRINGE (ML) INJECTION PRN
Status: DISCONTINUED | OUTPATIENT
Start: 2019-10-23 | End: 2019-10-23 | Stop reason: HOSPADM

## 2019-10-23 RX ORDER — SUCCINYLCHOLINE/SOD CL,ISO/PF 200MG/10ML
SYRINGE (ML) INTRAVENOUS PRN
Status: DISCONTINUED | OUTPATIENT
Start: 2019-10-23 | End: 2019-10-23 | Stop reason: SDUPTHER

## 2019-10-23 RX ORDER — OXYCODONE HYDROCHLORIDE AND ACETAMINOPHEN 5; 325 MG/1; MG/1
2 TABLET ORAL EVERY 4 HOURS PRN
Status: DISCONTINUED | OUTPATIENT
Start: 2019-10-23 | End: 2019-10-23 | Stop reason: SDUPTHER

## 2019-10-23 RX ADMIN — HYDROMORPHONE HYDROCHLORIDE 1 MG: 1 INJECTION, SOLUTION INTRAMUSCULAR; INTRAVENOUS; SUBCUTANEOUS at 11:29

## 2019-10-23 RX ADMIN — FENTANYL CITRATE 25 MCG: 50 INJECTION, SOLUTION INTRAMUSCULAR; INTRAVENOUS at 12:23

## 2019-10-23 RX ADMIN — Medication 120 MG: at 09:41

## 2019-10-23 RX ADMIN — ONDANSETRON 4 MG: 2 INJECTION INTRAMUSCULAR; INTRAVENOUS at 09:50

## 2019-10-23 RX ADMIN — ONDANSETRON 4 MG: 2 INJECTION INTRAMUSCULAR; INTRAVENOUS at 11:17

## 2019-10-23 RX ADMIN — MORPHINE SULFATE 4 MG: 4 INJECTION, SOLUTION INTRAMUSCULAR; INTRAVENOUS at 13:20

## 2019-10-23 RX ADMIN — DEXAMETHASONE SODIUM PHOSPHATE 10 MG: 4 INJECTION, SOLUTION INTRAMUSCULAR; INTRAVENOUS at 09:50

## 2019-10-23 RX ADMIN — LIDOCAINE HYDROCHLORIDE 100 MG: 20 INJECTION, SOLUTION EPIDURAL; INFILTRATION; INTRACAUDAL; PERINEURAL at 09:41

## 2019-10-23 RX ADMIN — ROCURONIUM BROMIDE 20 MG: 10 INJECTION INTRAVENOUS at 10:53

## 2019-10-23 RX ADMIN — FENTANYL CITRATE 25 MCG: 50 INJECTION, SOLUTION INTRAMUSCULAR; INTRAVENOUS at 12:16

## 2019-10-23 RX ADMIN — OXYCODONE HYDROCHLORIDE AND ACETAMINOPHEN 2 TABLET: 5; 325 TABLET ORAL at 18:00

## 2019-10-23 RX ADMIN — FENTANYL CITRATE 50 MCG: 50 INJECTION INTRAMUSCULAR; INTRAVENOUS at 09:50

## 2019-10-23 RX ADMIN — SUGAMMADEX 400 MG: 100 INJECTION, SOLUTION INTRAVENOUS at 11:24

## 2019-10-23 RX ADMIN — HYDROMORPHONE HYDROCHLORIDE 0.5 MG: 1 INJECTION, SOLUTION INTRAMUSCULAR; INTRAVENOUS; SUBCUTANEOUS at 11:47

## 2019-10-23 RX ADMIN — PROPOFOL 200 MG: 10 INJECTION, EMULSION INTRAVENOUS at 09:41

## 2019-10-23 RX ADMIN — SODIUM CHLORIDE: 9 INJECTION, SOLUTION INTRAVENOUS at 06:26

## 2019-10-23 RX ADMIN — FENTANYL CITRATE 50 MCG: 50 INJECTION INTRAMUSCULAR; INTRAVENOUS at 10:51

## 2019-10-23 RX ADMIN — OXYCODONE HYDROCHLORIDE AND ACETAMINOPHEN 2 TABLET: 5; 325 TABLET ORAL at 14:05

## 2019-10-23 RX ADMIN — FENTANYL CITRATE 50 MCG: 50 INJECTION INTRAMUSCULAR; INTRAVENOUS at 10:55

## 2019-10-23 RX ADMIN — HYDROMORPHONE HYDROCHLORIDE 0.5 MG: 1 INJECTION, SOLUTION INTRAMUSCULAR; INTRAVENOUS; SUBCUTANEOUS at 11:58

## 2019-10-23 RX ADMIN — ROCURONIUM BROMIDE 20 MG: 10 INJECTION INTRAVENOUS at 10:06

## 2019-10-23 RX ADMIN — ROCURONIUM BROMIDE 30 MG: 10 INJECTION INTRAVENOUS at 09:50

## 2019-10-23 RX ADMIN — FENTANYL CITRATE 50 MCG: 50 INJECTION INTRAMUSCULAR; INTRAVENOUS at 10:01

## 2019-10-23 RX ADMIN — Medication 2 G: at 09:37

## 2019-10-23 RX ADMIN — FLUTICASONE PROPIONATE 1 PUFF: 110 AEROSOL, METERED RESPIRATORY (INHALATION) at 08:08

## 2019-10-23 RX ADMIN — MIDAZOLAM 2 MG: 1 INJECTION INTRAMUSCULAR; INTRAVENOUS at 09:37

## 2019-10-23 RX ADMIN — SODIUM CHLORIDE: 9 INJECTION, SOLUTION INTRAVENOUS at 09:37

## 2019-10-23 ASSESSMENT — PULMONARY FUNCTION TESTS
PIF_VALUE: 30
PIF_VALUE: 31
PIF_VALUE: 30
PIF_VALUE: 27
PIF_VALUE: 2
PIF_VALUE: 30
PIF_VALUE: 29
PIF_VALUE: 31
PIF_VALUE: 30
PIF_VALUE: 24
PIF_VALUE: 3
PIF_VALUE: 3
PIF_VALUE: 0
PIF_VALUE: 0
PIF_VALUE: 31
PIF_VALUE: 30
PIF_VALUE: 0
PIF_VALUE: 0
PIF_VALUE: 3
PIF_VALUE: 4
PIF_VALUE: 30
PIF_VALUE: 22
PIF_VALUE: 30
PIF_VALUE: 25
PIF_VALUE: 33
PIF_VALUE: 31
PIF_VALUE: 30
PIF_VALUE: 30
PIF_VALUE: 31
PIF_VALUE: 30
PIF_VALUE: 30
PIF_VALUE: 27
PIF_VALUE: 30
PIF_VALUE: 0
PIF_VALUE: 31
PIF_VALUE: 30
PIF_VALUE: 22
PIF_VALUE: 31
PIF_VALUE: 3
PIF_VALUE: 21
PIF_VALUE: 30
PIF_VALUE: 31
PIF_VALUE: 29
PIF_VALUE: 29
PIF_VALUE: 31
PIF_VALUE: 22
PIF_VALUE: 28
PIF_VALUE: 0
PIF_VALUE: 29
PIF_VALUE: 29
PIF_VALUE: 32
PIF_VALUE: 21
PIF_VALUE: 30
PIF_VALUE: 31
PIF_VALUE: 29
PIF_VALUE: 3
PIF_VALUE: 30
PIF_VALUE: 35
PIF_VALUE: 30
PIF_VALUE: 28
PIF_VALUE: 29
PIF_VALUE: 27
PIF_VALUE: 29
PIF_VALUE: 29
PIF_VALUE: 30
PIF_VALUE: 30
PIF_VALUE: 29
PIF_VALUE: 23
PIF_VALUE: 25
PIF_VALUE: 29
PIF_VALUE: 3
PIF_VALUE: 28
PIF_VALUE: 30
PIF_VALUE: 26
PIF_VALUE: 29
PIF_VALUE: 21
PIF_VALUE: 29
PIF_VALUE: 30
PIF_VALUE: 38
PIF_VALUE: 30
PIF_VALUE: 1
PIF_VALUE: 31
PIF_VALUE: 29
PIF_VALUE: 1
PIF_VALUE: 30
PIF_VALUE: 29
PIF_VALUE: 30
PIF_VALUE: 34
PIF_VALUE: 3
PIF_VALUE: 30
PIF_VALUE: 30
PIF_VALUE: 31
PIF_VALUE: 30
PIF_VALUE: 29
PIF_VALUE: 29
PIF_VALUE: 31
PIF_VALUE: 29
PIF_VALUE: 31
PIF_VALUE: 29
PIF_VALUE: 30
PIF_VALUE: 31
PIF_VALUE: 21
PIF_VALUE: 24
PIF_VALUE: 21
PIF_VALUE: 29
PIF_VALUE: 1
PIF_VALUE: 31
PIF_VALUE: 22
PIF_VALUE: 30
PIF_VALUE: 30
PIF_VALUE: 29
PIF_VALUE: 29
PIF_VALUE: 30
PIF_VALUE: 29
PIF_VALUE: 22
PIF_VALUE: 22
PIF_VALUE: 3

## 2019-10-23 ASSESSMENT — PAIN DESCRIPTION - PAIN TYPE
TYPE: SURGICAL PAIN

## 2019-10-23 ASSESSMENT — PAIN DESCRIPTION - FREQUENCY
FREQUENCY: CONTINUOUS

## 2019-10-23 ASSESSMENT — PAIN DESCRIPTION - DESCRIPTORS
DESCRIPTORS: DISCOMFORT
DESCRIPTORS: SHARP
DESCRIPTORS: THROBBING
DESCRIPTORS: SHARP
DESCRIPTORS: THROBBING
DESCRIPTORS: SHARP
DESCRIPTORS: TENDER
DESCRIPTORS: SHARP
DESCRIPTORS: ACHING
DESCRIPTORS: SHARP

## 2019-10-23 ASSESSMENT — PAIN DESCRIPTION - LOCATION
LOCATION: ABDOMEN

## 2019-10-23 ASSESSMENT — PAIN SCALES - GENERAL
PAINLEVEL_OUTOF10: 8
PAINLEVEL_OUTOF10: 5
PAINLEVEL_OUTOF10: 10
PAINLEVEL_OUTOF10: 0
PAINLEVEL_OUTOF10: 10
PAINLEVEL_OUTOF10: 8
PAINLEVEL_OUTOF10: 5
PAINLEVEL_OUTOF10: 7
PAINLEVEL_OUTOF10: 10
PAINLEVEL_OUTOF10: 3
PAINLEVEL_OUTOF10: 6

## 2019-10-23 ASSESSMENT — PAIN - FUNCTIONAL ASSESSMENT
PAIN_FUNCTIONAL_ASSESSMENT: PREVENTS OR INTERFERES SOME ACTIVE ACTIVITIES AND ADLS
PAIN_FUNCTIONAL_ASSESSMENT: 0-10
PAIN_FUNCTIONAL_ASSESSMENT: PREVENTS OR INTERFERES SOME ACTIVE ACTIVITIES AND ADLS

## 2019-10-23 ASSESSMENT — PAIN DESCRIPTION - ONSET
ONSET: ON-GOING
ONSET: ON-GOING
ONSET: AWAKENED FROM SLEEP
ONSET: ON-GOING
ONSET: ON-GOING

## 2019-10-23 ASSESSMENT — PAIN DESCRIPTION - PROGRESSION
CLINICAL_PROGRESSION: GRADUALLY IMPROVING
CLINICAL_PROGRESSION: GRADUALLY IMPROVING
CLINICAL_PROGRESSION: NOT CHANGED

## 2019-10-23 ASSESSMENT — PAIN DESCRIPTION - ORIENTATION
ORIENTATION: MID

## 2019-10-23 ASSESSMENT — LIFESTYLE VARIABLES: SMOKING_STATUS: 1

## 2019-10-23 ASSESSMENT — ENCOUNTER SYMPTOMS: SHORTNESS OF BREATH: 0

## 2019-10-24 ENCOUNTER — APPOINTMENT (OUTPATIENT)
Dept: CT IMAGING | Age: 39
End: 2019-10-24
Payer: COMMERCIAL

## 2019-10-24 ENCOUNTER — HOSPITAL ENCOUNTER (OUTPATIENT)
Age: 39
Setting detail: OBSERVATION
Discharge: HOME | End: 2019-10-26
Attending: EMERGENCY MEDICINE | Admitting: SURGERY
Payer: COMMERCIAL

## 2019-10-24 DIAGNOSIS — K91.89 POSTOPERATIVE ILEUS (HCC): ICD-10-CM

## 2019-10-24 DIAGNOSIS — K56.7 POSTOPERATIVE ILEUS (HCC): ICD-10-CM

## 2019-10-24 DIAGNOSIS — R10.32 POSTOPERATIVE LEFT LOWER QUADRANT ABDOMINAL PAIN: Primary | ICD-10-CM

## 2019-10-24 DIAGNOSIS — G89.18 POSTOPERATIVE LEFT LOWER QUADRANT ABDOMINAL PAIN: Primary | ICD-10-CM

## 2019-10-24 PROCEDURE — 6360000004 HC RX CONTRAST MEDICATION: Performed by: EMERGENCY MEDICINE

## 2019-10-24 PROCEDURE — 99285 EMERGENCY DEPT VISIT HI MDM: CPT

## 2019-10-24 PROCEDURE — 74177 CT ABD & PELVIS W/CONTRAST: CPT

## 2019-10-24 ASSESSMENT — PAIN DESCRIPTION - DESCRIPTORS: DESCRIPTORS: PRESSURE

## 2019-10-24 ASSESSMENT — PAIN - FUNCTIONAL ASSESSMENT: PAIN_FUNCTIONAL_ASSESSMENT: PREVENTS OR INTERFERES SOME ACTIVE ACTIVITIES AND ADLS

## 2019-10-24 ASSESSMENT — PAIN DESCRIPTION - ONSET: ONSET: PROGRESSIVE

## 2019-10-24 ASSESSMENT — PAIN DESCRIPTION - LOCATION: LOCATION: ABDOMEN

## 2019-10-24 ASSESSMENT — PAIN DESCRIPTION - PAIN TYPE: TYPE: ACUTE PAIN

## 2019-10-24 ASSESSMENT — PAIN SCALES - GENERAL: PAINLEVEL_OUTOF10: 10

## 2019-10-25 PROBLEM — G89.18 POST-OP PAIN: Status: ACTIVE | Noted: 2019-10-25

## 2019-10-25 PROBLEM — K91.89 ILEUS, POSTOPERATIVE (HCC): Status: ACTIVE | Noted: 2019-10-25

## 2019-10-25 PROBLEM — K56.7 ILEUS, POSTOPERATIVE (HCC): Status: ACTIVE | Noted: 2019-10-25

## 2019-10-25 LAB
A/G RATIO: 1 (ref 1.1–2.2)
ALBUMIN SERPL-MCNC: 4.2 G/DL (ref 3.4–5)
ALP BLD-CCNC: 93 U/L (ref 40–129)
ALT SERPL-CCNC: 41 U/L (ref 10–40)
ANION GAP SERPL CALCULATED.3IONS-SCNC: 12 MMOL/L (ref 3–16)
AST SERPL-CCNC: 48 U/L (ref 15–37)
BASOPHILS ABSOLUTE: 0.1 K/UL (ref 0–0.2)
BASOPHILS RELATIVE PERCENT: 0.4 %
BILIRUB SERPL-MCNC: 0.7 MG/DL (ref 0–1)
BUN BLDV-MCNC: 6 MG/DL (ref 7–20)
CALCIUM SERPL-MCNC: 9.7 MG/DL (ref 8.3–10.6)
CHLORIDE BLD-SCNC: 99 MMOL/L (ref 99–110)
CO2: 26 MMOL/L (ref 21–32)
CREAT SERPL-MCNC: 0.9 MG/DL (ref 0.6–1.1)
EOSINOPHILS ABSOLUTE: 0.1 K/UL (ref 0–0.6)
EOSINOPHILS RELATIVE PERCENT: 0.5 %
GFR AFRICAN AMERICAN: >60
GFR NON-AFRICAN AMERICAN: >60
GLOBULIN: 4.2 G/DL
GLUCOSE BLD-MCNC: 114 MG/DL (ref 70–99)
GONADOTROPIN, CHORIONIC (HCG) QUANT: <5 MIU/ML
HCT VFR BLD CALC: 38.8 % (ref 36–48)
HEMOGLOBIN: 12.8 G/DL (ref 12–16)
LACTIC ACID, SEPSIS: 1.3 MMOL/L (ref 0.4–1.9)
LYMPHOCYTES ABSOLUTE: 3.7 K/UL (ref 1–5.1)
LYMPHOCYTES RELATIVE PERCENT: 25.4 %
MAGNESIUM: 1.8 MG/DL (ref 1.8–2.4)
MCH RBC QN AUTO: 32.1 PG (ref 26–34)
MCHC RBC AUTO-ENTMCNC: 33 G/DL (ref 31–36)
MCV RBC AUTO: 97.3 FL (ref 80–100)
MONOCYTES ABSOLUTE: 1 K/UL (ref 0–1.3)
MONOCYTES RELATIVE PERCENT: 6.7 %
NEUTROPHILS ABSOLUTE: 9.8 K/UL (ref 1.7–7.7)
NEUTROPHILS RELATIVE PERCENT: 67 %
PDW BLD-RTO: 12.8 % (ref 12.4–15.4)
PLATELET # BLD: 265 K/UL (ref 135–450)
PMV BLD AUTO: 8.2 FL (ref 5–10.5)
POTASSIUM REFLEX MAGNESIUM: 3.4 MMOL/L (ref 3.5–5.1)
RBC # BLD: 3.99 M/UL (ref 4–5.2)
SODIUM BLD-SCNC: 137 MMOL/L (ref 136–145)
TOTAL PROTEIN: 8.4 G/DL (ref 6.4–8.2)
WBC # BLD: 14.6 K/UL (ref 4–11)

## 2019-10-25 PROCEDURE — 2580000003 HC RX 258: Performed by: SURGERY

## 2019-10-25 PROCEDURE — 6370000000 HC RX 637 (ALT 250 FOR IP): Performed by: SURGERY

## 2019-10-25 PROCEDURE — 84702 CHORIONIC GONADOTROPIN TEST: CPT

## 2019-10-25 PROCEDURE — 99024 POSTOP FOLLOW-UP VISIT: CPT | Performed by: SURGERY

## 2019-10-25 PROCEDURE — 6360000004 HC RX CONTRAST MEDICATION: Performed by: EMERGENCY MEDICINE

## 2019-10-25 PROCEDURE — 36415 COLL VENOUS BLD VENIPUNCTURE: CPT

## 2019-10-25 PROCEDURE — 83605 ASSAY OF LACTIC ACID: CPT

## 2019-10-25 PROCEDURE — G0378 HOSPITAL OBSERVATION PER HR: HCPCS

## 2019-10-25 PROCEDURE — 85025 COMPLETE CBC W/AUTO DIFF WBC: CPT

## 2019-10-25 PROCEDURE — 96375 TX/PRO/DX INJ NEW DRUG ADDON: CPT

## 2019-10-25 PROCEDURE — 96374 THER/PROPH/DIAG INJ IV PUSH: CPT

## 2019-10-25 PROCEDURE — 87040 BLOOD CULTURE FOR BACTERIA: CPT

## 2019-10-25 PROCEDURE — 6360000002 HC RX W HCPCS: Performed by: EMERGENCY MEDICINE

## 2019-10-25 PROCEDURE — 83735 ASSAY OF MAGNESIUM: CPT

## 2019-10-25 PROCEDURE — 96361 HYDRATE IV INFUSION ADD-ON: CPT

## 2019-10-25 PROCEDURE — 80053 COMPREHEN METABOLIC PANEL: CPT

## 2019-10-25 PROCEDURE — APPNB30 APP NON BILLABLE TIME 0-30 MINS: Performed by: NURSE PRACTITIONER

## 2019-10-25 PROCEDURE — 94760 N-INVAS EAR/PLS OXIMETRY 1: CPT

## 2019-10-25 PROCEDURE — 94640 AIRWAY INHALATION TREATMENT: CPT

## 2019-10-25 RX ORDER — POLYETHYLENE GLYCOL 3350 17 G/17G
17 POWDER, FOR SOLUTION ORAL 2 TIMES DAILY
Status: DISCONTINUED | OUTPATIENT
Start: 2019-10-25 | End: 2019-10-26 | Stop reason: HOSPADM

## 2019-10-25 RX ORDER — DOCUSATE SODIUM 100 MG/1
100 CAPSULE, LIQUID FILLED ORAL 2 TIMES DAILY
Status: DISCONTINUED | OUTPATIENT
Start: 2019-10-25 | End: 2019-10-26 | Stop reason: HOSPADM

## 2019-10-25 RX ORDER — MORPHINE SULFATE 4 MG/ML
4 INJECTION, SOLUTION INTRAMUSCULAR; INTRAVENOUS ONCE
Status: COMPLETED | OUTPATIENT
Start: 2019-10-25 | End: 2019-10-25

## 2019-10-25 RX ORDER — OXYCODONE HYDROCHLORIDE 5 MG/1
5 TABLET ORAL EVERY 4 HOURS PRN
Status: DISCONTINUED | OUTPATIENT
Start: 2019-10-25 | End: 2019-10-26 | Stop reason: HOSPADM

## 2019-10-25 RX ORDER — ONDANSETRON 4 MG/1
4 TABLET, ORALLY DISINTEGRATING ORAL EVERY 8 HOURS PRN
Status: DISCONTINUED | OUTPATIENT
Start: 2019-10-25 | End: 2019-10-26 | Stop reason: HOSPADM

## 2019-10-25 RX ORDER — ACETAMINOPHEN 325 MG/1
650 TABLET ORAL EVERY 6 HOURS
Status: DISCONTINUED | OUTPATIENT
Start: 2019-10-25 | End: 2019-10-26 | Stop reason: HOSPADM

## 2019-10-25 RX ORDER — ONDANSETRON 2 MG/ML
4 INJECTION INTRAMUSCULAR; INTRAVENOUS EVERY 6 HOURS PRN
Status: DISCONTINUED | OUTPATIENT
Start: 2019-10-25 | End: 2019-10-26 | Stop reason: HOSPADM

## 2019-10-25 RX ORDER — POLYETHYLENE GLYCOL 3350 17 G/17G
17 POWDER, FOR SOLUTION ORAL DAILY PRN
Status: DISCONTINUED | OUTPATIENT
Start: 2019-10-25 | End: 2019-10-25

## 2019-10-25 RX ORDER — DICYCLOMINE HYDROCHLORIDE 10 MG/1
CAPSULE ORAL 3 TIMES DAILY PRN
COMMUNITY
Start: 2019-10-17 | End: 2022-08-25

## 2019-10-25 RX ORDER — NAPROXEN 250 MG/1
500 TABLET ORAL 2 TIMES DAILY
Status: DISCONTINUED | OUTPATIENT
Start: 2019-10-25 | End: 2019-10-26 | Stop reason: HOSPADM

## 2019-10-25 RX ORDER — PAROXETINE 10 MG/1
10 TABLET, FILM COATED ORAL DAILY
Status: DISCONTINUED | OUTPATIENT
Start: 2019-10-25 | End: 2019-10-26 | Stop reason: HOSPADM

## 2019-10-25 RX ORDER — LIDOCAINE 4 G/G
1 PATCH TOPICAL EVERY 12 HOURS
Status: DISCONTINUED | OUTPATIENT
Start: 2019-10-25 | End: 2019-10-26 | Stop reason: HOSPADM

## 2019-10-25 RX ORDER — OXYCODONE HYDROCHLORIDE 10 MG/1
10 TABLET ORAL EVERY 4 HOURS PRN
Status: DISCONTINUED | OUTPATIENT
Start: 2019-10-25 | End: 2019-10-26 | Stop reason: HOSPADM

## 2019-10-25 RX ORDER — ONDANSETRON 4 MG/1
4 TABLET, ORALLY DISINTEGRATING ORAL
COMMUNITY
Start: 2019-10-17 | End: 2019-10-25

## 2019-10-25 RX ORDER — DEXTROSE, SODIUM CHLORIDE, SODIUM LACTATE, POTASSIUM CHLORIDE, AND CALCIUM CHLORIDE 5; .6; .31; .03; .02 G/100ML; G/100ML; G/100ML; G/100ML; G/100ML
INJECTION, SOLUTION INTRAVENOUS CONTINUOUS
Status: DISCONTINUED | OUTPATIENT
Start: 2019-10-25 | End: 2019-10-26 | Stop reason: HOSPADM

## 2019-10-25 RX ORDER — SODIUM CHLORIDE 0.9 % (FLUSH) 0.9 %
10 SYRINGE (ML) INJECTION PRN
Status: DISCONTINUED | OUTPATIENT
Start: 2019-10-25 | End: 2019-10-26 | Stop reason: HOSPADM

## 2019-10-25 RX ORDER — SODIUM CHLORIDE 0.9 % (FLUSH) 0.9 %
10 SYRINGE (ML) INJECTION EVERY 12 HOURS SCHEDULED
Status: DISCONTINUED | OUTPATIENT
Start: 2019-10-25 | End: 2019-10-26 | Stop reason: HOSPADM

## 2019-10-25 RX ORDER — FLUTICASONE PROPIONATE 110 UG/1
2 AEROSOL, METERED RESPIRATORY (INHALATION) 2 TIMES DAILY
Status: DISCONTINUED | OUTPATIENT
Start: 2019-10-25 | End: 2019-10-26 | Stop reason: HOSPADM

## 2019-10-25 RX ORDER — ALBUTEROL SULFATE 90 UG/1
2 AEROSOL, METERED RESPIRATORY (INHALATION) EVERY 4 HOURS PRN
Status: DISCONTINUED | OUTPATIENT
Start: 2019-10-25 | End: 2019-10-26 | Stop reason: HOSPADM

## 2019-10-25 RX ADMIN — ACETAMINOPHEN 650 MG: 325 TABLET ORAL at 21:15

## 2019-10-25 RX ADMIN — NAPROXEN 500 MG: 250 TABLET ORAL at 21:15

## 2019-10-25 RX ADMIN — POLYETHYLENE GLYCOL 3350 17 G: 17 POWDER, FOR SOLUTION ORAL at 09:19

## 2019-10-25 RX ADMIN — IOVERSOL 100 ML: 678 INJECTION INTRA-ARTERIAL; INTRAVENOUS at 00:58

## 2019-10-25 RX ADMIN — ACETAMINOPHEN 650 MG: 325 TABLET ORAL at 09:19

## 2019-10-25 RX ADMIN — Medication 2 PUFF: at 20:07

## 2019-10-25 RX ADMIN — NAPROXEN 500 MG: 250 TABLET ORAL at 09:19

## 2019-10-25 RX ADMIN — ACETAMINOPHEN 650 MG: 325 TABLET ORAL at 15:55

## 2019-10-25 RX ADMIN — DOCUSATE SODIUM 100 MG: 100 CAPSULE, LIQUID FILLED ORAL at 09:19

## 2019-10-25 RX ADMIN — MORPHINE SULFATE 4 MG: 4 INJECTION, SOLUTION INTRAMUSCULAR; INTRAVENOUS at 01:01

## 2019-10-25 RX ADMIN — PAROXETINE HYDROCHLORIDE 10 MG: 10 TABLET, FILM COATED ORAL at 09:19

## 2019-10-25 RX ADMIN — SODIUM CHLORIDE, PRESERVATIVE FREE 10 ML: 5 INJECTION INTRAVENOUS at 09:20

## 2019-10-25 RX ADMIN — POLYETHYLENE GLYCOL 3350 17 G: 17 POWDER, FOR SOLUTION ORAL at 21:15

## 2019-10-25 RX ADMIN — DOCUSATE SODIUM 100 MG: 100 CAPSULE, LIQUID FILLED ORAL at 21:15

## 2019-10-25 RX ADMIN — SODIUM CHLORIDE, SODIUM LACTATE, POTASSIUM CHLORIDE, CALCIUM CHLORIDE AND DEXTROSE MONOHYDRATE: 5; 600; 310; 30; 20 INJECTION, SOLUTION INTRAVENOUS at 09:19

## 2019-10-25 RX ADMIN — Medication 2 PUFF: at 12:04

## 2019-10-25 ASSESSMENT — PAIN DESCRIPTION - ONSET
ONSET: ON-GOING
ONSET: ON-GOING

## 2019-10-25 ASSESSMENT — PAIN DESCRIPTION - PROGRESSION
CLINICAL_PROGRESSION: GRADUALLY IMPROVING

## 2019-10-25 ASSESSMENT — PAIN SCALES - GENERAL
PAINLEVEL_OUTOF10: 4
PAINLEVEL_OUTOF10: 5
PAINLEVEL_OUTOF10: 4
PAINLEVEL_OUTOF10: 0
PAINLEVEL_OUTOF10: 4
PAINLEVEL_OUTOF10: 0
PAINLEVEL_OUTOF10: 10

## 2019-10-25 ASSESSMENT — PAIN DESCRIPTION - LOCATION
LOCATION: ABDOMEN
LOCATION: ABDOMEN

## 2019-10-25 ASSESSMENT — PAIN - FUNCTIONAL ASSESSMENT
PAIN_FUNCTIONAL_ASSESSMENT: PREVENTS OR INTERFERES SOME ACTIVE ACTIVITIES AND ADLS
PAIN_FUNCTIONAL_ASSESSMENT: PREVENTS OR INTERFERES SOME ACTIVE ACTIVITIES AND ADLS

## 2019-10-25 ASSESSMENT — PAIN DESCRIPTION - ORIENTATION
ORIENTATION: LEFT
ORIENTATION: RIGHT;LEFT

## 2019-10-25 ASSESSMENT — PAIN DESCRIPTION - FREQUENCY: FREQUENCY: CONTINUOUS

## 2019-10-25 ASSESSMENT — PAIN DESCRIPTION - DESCRIPTORS
DESCRIPTORS: DISCOMFORT
DESCRIPTORS: DISCOMFORT

## 2019-10-25 ASSESSMENT — PAIN DESCRIPTION - PAIN TYPE: TYPE: ACUTE PAIN

## 2019-10-26 VITALS
WEIGHT: 220.68 LBS | OXYGEN SATURATION: 97 % | TEMPERATURE: 98.2 F | SYSTOLIC BLOOD PRESSURE: 111 MMHG | BODY MASS INDEX: 34.64 KG/M2 | HEART RATE: 71 BPM | DIASTOLIC BLOOD PRESSURE: 76 MMHG | RESPIRATION RATE: 18 BRPM | HEIGHT: 67 IN

## 2019-10-26 LAB
ALBUMIN SERPL-MCNC: 2.7 G/DL (ref 3.4–5)
ALP BLD-CCNC: 76 U/L (ref 40–129)
ALT SERPL-CCNC: 37 U/L (ref 10–40)
ANION GAP SERPL CALCULATED.3IONS-SCNC: 12 MMOL/L (ref 3–16)
AST SERPL-CCNC: 29 U/L (ref 15–37)
BASOPHILS ABSOLUTE: 0 K/UL (ref 0–0.2)
BASOPHILS RELATIVE PERCENT: 0.4 %
BILIRUB SERPL-MCNC: 0.6 MG/DL (ref 0–1)
BILIRUBIN DIRECT: <0.2 MG/DL (ref 0–0.3)
BILIRUBIN, INDIRECT: ABNORMAL MG/DL (ref 0–1)
BUN BLDV-MCNC: 8 MG/DL (ref 7–20)
CALCIUM SERPL-MCNC: 8.4 MG/DL (ref 8.3–10.6)
CHLORIDE BLD-SCNC: 102 MMOL/L (ref 99–110)
CO2: 24 MMOL/L (ref 21–32)
CREAT SERPL-MCNC: 0.7 MG/DL (ref 0.6–1.1)
EOSINOPHILS ABSOLUTE: 0.1 K/UL (ref 0–0.6)
EOSINOPHILS RELATIVE PERCENT: 1.3 %
GFR AFRICAN AMERICAN: >60
GFR NON-AFRICAN AMERICAN: >60
GLUCOSE BLD-MCNC: 86 MG/DL (ref 70–99)
HCT VFR BLD CALC: 33.6 % (ref 36–48)
HEMOGLOBIN: 11.6 G/DL (ref 12–16)
LYMPHOCYTES ABSOLUTE: 2.1 K/UL (ref 1–5.1)
LYMPHOCYTES RELATIVE PERCENT: 25.4 %
MCH RBC QN AUTO: 33.5 PG (ref 26–34)
MCHC RBC AUTO-ENTMCNC: 34.6 G/DL (ref 31–36)
MCV RBC AUTO: 96.8 FL (ref 80–100)
MONOCYTES ABSOLUTE: 0.8 K/UL (ref 0–1.3)
MONOCYTES RELATIVE PERCENT: 9.7 %
NEUTROPHILS ABSOLUTE: 5.3 K/UL (ref 1.7–7.7)
NEUTROPHILS RELATIVE PERCENT: 63.2 %
PDW BLD-RTO: 12.5 % (ref 12.4–15.4)
PLATELET # BLD: 212 K/UL (ref 135–450)
PMV BLD AUTO: 8.5 FL (ref 5–10.5)
POTASSIUM REFLEX MAGNESIUM: 3.6 MMOL/L (ref 3.5–5.1)
RBC # BLD: 3.48 M/UL (ref 4–5.2)
SODIUM BLD-SCNC: 138 MMOL/L (ref 136–145)
TOTAL PROTEIN: 6.2 G/DL (ref 6.4–8.2)
WBC # BLD: 8.5 K/UL (ref 4–11)

## 2019-10-26 PROCEDURE — G0378 HOSPITAL OBSERVATION PER HR: HCPCS

## 2019-10-26 PROCEDURE — APPSS15 APP SPLIT SHARED TIME 0-15 MINUTES: Performed by: PHYSICIAN ASSISTANT

## 2019-10-26 PROCEDURE — 94760 N-INVAS EAR/PLS OXIMETRY 1: CPT

## 2019-10-26 PROCEDURE — 36415 COLL VENOUS BLD VENIPUNCTURE: CPT

## 2019-10-26 PROCEDURE — 99024 POSTOP FOLLOW-UP VISIT: CPT | Performed by: PHYSICIAN ASSISTANT

## 2019-10-26 PROCEDURE — 6360000002 HC RX W HCPCS: Performed by: SURGERY

## 2019-10-26 PROCEDURE — 6370000000 HC RX 637 (ALT 250 FOR IP): Performed by: SURGERY

## 2019-10-26 PROCEDURE — 80076 HEPATIC FUNCTION PANEL: CPT

## 2019-10-26 PROCEDURE — 85025 COMPLETE CBC W/AUTO DIFF WBC: CPT

## 2019-10-26 PROCEDURE — 96361 HYDRATE IV INFUSION ADD-ON: CPT

## 2019-10-26 PROCEDURE — 94640 AIRWAY INHALATION TREATMENT: CPT

## 2019-10-26 PROCEDURE — 80048 BASIC METABOLIC PNL TOTAL CA: CPT

## 2019-10-26 PROCEDURE — APPNB30 APP NON BILLABLE TIME 0-30 MINS: Performed by: PHYSICIAN ASSISTANT

## 2019-10-26 RX ADMIN — ONDANSETRON 4 MG: 2 INJECTION INTRAMUSCULAR; INTRAVENOUS at 03:03

## 2019-10-26 RX ADMIN — POLYETHYLENE GLYCOL 3350 17 G: 17 POWDER, FOR SOLUTION ORAL at 09:05

## 2019-10-26 RX ADMIN — DOCUSATE SODIUM 100 MG: 100 CAPSULE, LIQUID FILLED ORAL at 09:07

## 2019-10-26 RX ADMIN — Medication 2 PUFF: at 08:02

## 2019-10-26 RX ADMIN — NAPROXEN 500 MG: 250 TABLET ORAL at 09:07

## 2019-10-26 RX ADMIN — ACETAMINOPHEN 650 MG: 325 TABLET ORAL at 03:00

## 2019-10-26 RX ADMIN — ACETAMINOPHEN 650 MG: 325 TABLET ORAL at 09:07

## 2019-10-26 RX ADMIN — PAROXETINE HYDROCHLORIDE 10 MG: 10 TABLET, FILM COATED ORAL at 09:07

## 2019-10-26 ASSESSMENT — PAIN SCALES - GENERAL
PAINLEVEL_OUTOF10: 0

## 2019-10-30 LAB — BLOOD CULTURE, ROUTINE: NORMAL

## 2019-11-12 ENCOUNTER — OFFICE VISIT (OUTPATIENT)
Dept: SURGERY | Age: 39
End: 2019-11-12

## 2019-11-12 VITALS
SYSTOLIC BLOOD PRESSURE: 124 MMHG | HEIGHT: 67 IN | DIASTOLIC BLOOD PRESSURE: 88 MMHG | HEART RATE: 84 BPM | BODY MASS INDEX: 34.56 KG/M2

## 2019-11-12 DIAGNOSIS — R19.7 DIARRHEA DUE TO MALABSORPTION: ICD-10-CM

## 2019-11-12 DIAGNOSIS — K90.9 DIARRHEA DUE TO MALABSORPTION: ICD-10-CM

## 2019-11-12 DIAGNOSIS — K80.20 SYMPTOMATIC CHOLELITHIASIS: Primary | ICD-10-CM

## 2019-11-12 DIAGNOSIS — Z90.49 S/P LAPAROSCOPIC CHOLECYSTECTOMY: ICD-10-CM

## 2019-11-12 PROCEDURE — 99024 POSTOP FOLLOW-UP VISIT: CPT | Performed by: SURGERY

## 2019-11-12 RX ORDER — CHOLESTYRAMINE 4 G/9G
1 POWDER, FOR SUSPENSION ORAL 3 TIMES DAILY
Qty: 90 PACKET | Refills: 3 | Status: SHIPPED | OUTPATIENT
Start: 2019-11-12

## 2020-02-17 ENCOUNTER — HOSPITAL ENCOUNTER (EMERGENCY)
Age: 40
Discharge: HOME | End: 2020-02-17
Attending: EMERGENCY MEDICINE
Payer: COMMERCIAL

## 2020-02-17 ENCOUNTER — APPOINTMENT (OUTPATIENT)
Dept: CT IMAGING | Age: 40
End: 2020-02-17
Payer: COMMERCIAL

## 2020-02-17 VITALS
HEART RATE: 86 BPM | WEIGHT: 220.68 LBS | TEMPERATURE: 98.6 F | DIASTOLIC BLOOD PRESSURE: 79 MMHG | OXYGEN SATURATION: 99 % | SYSTOLIC BLOOD PRESSURE: 135 MMHG | RESPIRATION RATE: 17 BRPM | BODY MASS INDEX: 34.64 KG/M2 | HEIGHT: 67 IN

## 2020-02-17 LAB
A/G RATIO: 1 (ref 1.1–2.2)
ALBUMIN SERPL-MCNC: 4.1 G/DL (ref 3.4–5)
ALP BLD-CCNC: 91 U/L (ref 40–129)
ALT SERPL-CCNC: 18 U/L (ref 10–40)
ANION GAP SERPL CALCULATED.3IONS-SCNC: 12 MMOL/L (ref 3–16)
AST SERPL-CCNC: 17 U/L (ref 15–37)
BASOPHILS ABSOLUTE: 0 K/UL (ref 0–0.2)
BASOPHILS RELATIVE PERCENT: 0.6 %
BILIRUB SERPL-MCNC: 0.4 MG/DL (ref 0–1)
BUN BLDV-MCNC: 12 MG/DL (ref 7–20)
CALCIUM SERPL-MCNC: 9.4 MG/DL (ref 8.3–10.6)
CHLORIDE BLD-SCNC: 100 MMOL/L (ref 99–110)
CO2: 27 MMOL/L (ref 21–32)
CREAT SERPL-MCNC: 0.8 MG/DL (ref 0.6–1.1)
EOSINOPHILS ABSOLUTE: 0.1 K/UL (ref 0–0.6)
EOSINOPHILS RELATIVE PERCENT: 1.9 %
GFR AFRICAN AMERICAN: >60
GFR NON-AFRICAN AMERICAN: >60
GLOBULIN: 4 G/DL
GLUCOSE BLD-MCNC: 115 MG/DL (ref 70–99)
HCT VFR BLD CALC: 39.5 % (ref 36–48)
HEMOGLOBIN: 13.5 G/DL (ref 12–16)
LYMPHOCYTES ABSOLUTE: 3.6 K/UL (ref 1–5.1)
LYMPHOCYTES RELATIVE PERCENT: 47.6 %
MCH RBC QN AUTO: 32.5 PG (ref 26–34)
MCHC RBC AUTO-ENTMCNC: 34.2 G/DL (ref 31–36)
MCV RBC AUTO: 95.1 FL (ref 80–100)
MONOCYTES ABSOLUTE: 0.6 K/UL (ref 0–1.3)
MONOCYTES RELATIVE PERCENT: 7.9 %
NEUTROPHILS ABSOLUTE: 3.2 K/UL (ref 1.7–7.7)
NEUTROPHILS RELATIVE PERCENT: 42 %
PDW BLD-RTO: 13.1 % (ref 12.4–15.4)
PLATELET # BLD: 254 K/UL (ref 135–450)
PMV BLD AUTO: 8.1 FL (ref 5–10.5)
POTASSIUM REFLEX MAGNESIUM: 3.7 MMOL/L (ref 3.5–5.1)
RBC # BLD: 4.16 M/UL (ref 4–5.2)
SODIUM BLD-SCNC: 139 MMOL/L (ref 136–145)
TOTAL PROTEIN: 8.1 G/DL (ref 6.4–8.2)
WBC # BLD: 7.6 K/UL (ref 4–11)

## 2020-02-17 PROCEDURE — 99283 EMERGENCY DEPT VISIT LOW MDM: CPT

## 2020-02-17 PROCEDURE — 6360000002 HC RX W HCPCS: Performed by: EMERGENCY MEDICINE

## 2020-02-17 PROCEDURE — 96374 THER/PROPH/DIAG INJ IV PUSH: CPT

## 2020-02-17 PROCEDURE — 70450 CT HEAD/BRAIN W/O DYE: CPT

## 2020-02-17 PROCEDURE — 85025 COMPLETE CBC W/AUTO DIFF WBC: CPT

## 2020-02-17 PROCEDURE — 96375 TX/PRO/DX INJ NEW DRUG ADDON: CPT

## 2020-02-17 PROCEDURE — 80053 COMPREHEN METABOLIC PANEL: CPT

## 2020-02-17 RX ORDER — ONDANSETRON 2 MG/ML
4 INJECTION INTRAMUSCULAR; INTRAVENOUS ONCE
Status: COMPLETED | OUTPATIENT
Start: 2020-02-17 | End: 2020-02-17

## 2020-02-17 RX ORDER — BUTALBITAL, ASPIRIN, AND CAFFEINE 50; 325; 40 MG/1; MG/1; MG/1
1 CAPSULE ORAL EVERY 4 HOURS PRN
Qty: 9 CAPSULE | Refills: 3 | Status: SHIPPED | OUTPATIENT
Start: 2020-02-17 | End: 2020-02-26

## 2020-02-17 RX ORDER — NAPROXEN 500 MG/1
500 TABLET ORAL 2 TIMES DAILY
Qty: 20 TABLET | Refills: 0 | Status: SHIPPED | OUTPATIENT
Start: 2020-02-17 | End: 2020-02-27

## 2020-02-17 RX ADMIN — HYDROMORPHONE HYDROCHLORIDE 0.5 MG: 1 INJECTION, SOLUTION INTRAMUSCULAR; INTRAVENOUS; SUBCUTANEOUS at 22:38

## 2020-02-17 RX ADMIN — ONDANSETRON 4 MG: 2 INJECTION INTRAMUSCULAR; INTRAVENOUS at 22:40

## 2020-02-17 ASSESSMENT — PAIN DESCRIPTION - ONSET: ONSET: ON-GOING

## 2020-02-17 ASSESSMENT — PAIN DESCRIPTION - PAIN TYPE
TYPE: ACUTE PAIN
TYPE: ACUTE PAIN

## 2020-02-17 ASSESSMENT — PAIN DESCRIPTION - ORIENTATION: ORIENTATION: ANTERIOR

## 2020-02-17 ASSESSMENT — PAIN DESCRIPTION - PROGRESSION: CLINICAL_PROGRESSION: NOT CHANGED

## 2020-02-17 ASSESSMENT — PAIN DESCRIPTION - LOCATION
LOCATION: HEAD
LOCATION: HEAD

## 2020-02-17 ASSESSMENT — PAIN SCALES - GENERAL
PAINLEVEL_OUTOF10: 8
PAINLEVEL_OUTOF10: 8

## 2020-02-17 ASSESSMENT — PAIN DESCRIPTION - DESCRIPTORS
DESCRIPTORS: SHOOTING
DESCRIPTORS: SHARP;SHOOTING;THROBBING

## 2020-02-17 ASSESSMENT — PAIN - FUNCTIONAL ASSESSMENT: PAIN_FUNCTIONAL_ASSESSMENT: ACTIVITIES ARE NOT PREVENTED

## 2020-02-17 ASSESSMENT — PAIN DESCRIPTION - FREQUENCY: FREQUENCY: CONTINUOUS

## 2020-02-18 NOTE — ED PROVIDER NOTES
[facility]  eMERGENCY dEPARTMENT eNCOUnter      Pt Name: Riaz Mckeon  MRN: [id number]  [place] 1980  Date of evaluation: 2/17/2020  Provider: Saray Vela MD  PCP: Tomasz Jeffries       Chief Complaint   Patient presents with    Migraine     started yesterday. hx migraine. pt is sensitive to light, sound, and smells. HISTORY OFPRESENT ILLNESS   (Location/Symptom, Timing/Onset, Context/Setting, Quality, Duration, Modifying Factors,Severity)  Note limiting factors. Riaz Mckeon is a 44 y.o. female is with a headache she says she has a history of migraines her migraines started yesterday seem to get worse today she says she is very sensitive to light and smells she rates her pain at an 8 out of 10 she denies any head trauma    Nursing Notes were all reviewed and agreed with or any disagreements were addressed  in the HPI. REVIEW OF SYSTEMS    (2-9 systems for level 4, 10 or more for level 5)     Review of Systems    Positives and Pertinent negatives as per HPI. Except as noted above in the ROS, all other systems were reviewed andnegative.        PASTMEDICAL HISTORY     Past Medical History:   Diagnosis Date    Arthritis     Asthma     Back pain     Depression     Hyperlipidemia     Neck pain     PMDD (premenstrual dysphoric disorder)     STD (female)          SURGICAL HISTORY       Past Surgical History:   Procedure Laterality Date    APPENDECTOMY      CHOLECYSTECTOMY, LAPAROSCOPIC N/A 10/23/2019    ROBOTIC-ASSISTED LAPAROSCOPIC CHOLECYSTECTOMY WITH CHOLANGIOGRAM WITH C-ARM, performed by Yajaira Grant MD at [facility]       Previous Medications    ALBUTEROL SULFATE  (90 BASE) MCG/ACT INHALER    Inhale 2 puffs into the lungs    CHOLESTYRAMINE (QUESTRAN) 4 G PACKET    Take 1 packet by mouth 3 times daily    DICYCLOMINE (BENTYL) 10 MG CAPSULE    Take by mouth 3 times daily as needed (bowel spasm)     MOMETASONE (ASMANEX) 110 MCG/INH AEPB    Inhale 2 puffs into the lungs 2 times daily    ONDANSETRON (ZOFRAN ODT) 4 MG DISINTEGRATING TABLET    Take 1 tablet by mouth every 8 hours as needed for Nausea or Vomiting    PAROXETINE (PAXIL) 10 MG TABLET    Take 10 mg by mouth    PSYLLIUM (METAMUCIL) 28.3 % POWD    Take 7.5 g by mouth 2 times daily Please give 660 g or equivalent bottle.   Thanks       ALLERGIES     Codeine; Flexeril [cyclobenzaprine]; and Tramadol    FAMILY HISTORY       Family History   Problem Relation Age of Onset    High Blood Pressure Mother     High Cholesterol Mother     Diabetes Father           SOCIAL HISTORY       Social History     Socioeconomic History    Marital status: Single     Spouse name: Not on file    Number of children: Not on file    Years of education: Not on file    Highest education level: Not on file   Occupational History    Not on file   Social Needs    Financial resource strain: Not on file    Food insecurity:     Worry: Not on file     Inability: Not on file    Transportation needs:     Medical: Not on file     Non-medical: Not on file   Tobacco Use    Smoking status: Current Every Day Smoker     Packs/day: 0.25     Years: 9.00     Pack years: 2.25     Types: Cigarettes    Smokeless tobacco: Never Used   Substance and Sexual Activity    Alcohol use: Yes     Comment: occasionally    Drug use: No    Sexual activity: Not Currently   Lifestyle    Physical activity:     Days per week: Not on file     Minutes per session: Not on file    Stress: Not on file   Relationships    Social connections:     Talks on phone: Not on file     Gets together: Not on file     Attends Confucianist service: Not on file     Active member of club or organization: Not on file     Attends meetings of clubs or organizations: Not on file     Relationship status: Not on file    Intimate partner violence:     Fear of current or ex partner: Not on file     Emotionally abused: Not on file     Physically abused: Not on file     Forced sexual activity: Not on file   Other Topics Concern    Not on file   Social History Narrative    Not on file       SCREENINGS      @LTYN([id number])@      PHYSICAL EXAM    (up to 7 for level 4, 8 or more for level 5)     ED Triage Vitals [02/17/20 2152]   BP Temp Temp Source Pulse Resp SpO2 Height Weight   (!) 149/109 98.8 °F (37.1 °C) Oral 84 18 98 % 5' 7\" (1.702 m) 220 lb 10.9 oz (100.1 kg)       Physical Exam      General Appearance:  Alert, cooperative, no distress, appears stated age. Head:  Normocephalic, without obviousabnormality, atraumatic. Eyes:  conjunctiva/corneas clear, EOM's intact. Sclera anicteric. ENT: Mucous membranes moist.   Neck: Supple, symmetrical, trachea midline, no adenopathy. No jugular venous distention. Lungs:   Clear to auscultation bilaterally, respirationsunlabored. No rales, rhonchi or wheezes. Chest Wall:  No tenderness. Heart:  Regular rate and rhythm, S1 and S2 normal, no murmur, rub or gallop. Abdomen:   Soft, non-tender, bowel sounds active,   no masses, no organomegaly. Extremities: No edema, cords or calf tenderness. Full range of motion. Pulses: 2+ and symmetric   Skin: Turgor is normal, no rashes or lesions. Neurologic: Alert and oriented X 3. No focal findings.   Motor grossly normal.  Speech clear, no drift, CN III-XII grossly intact,        DIAGNOSTIC RESULTS   LABS:    Labs Reviewed   COMPREHENSIVE METABOLIC PANEL W/ REFLEX TO MG FOR LOW K - Abnormal; Notable for the following components:       Result Value    Glucose 115 (*)     Albumin/Globulin Ratio 1.0 (*)     All other components within normal limits    Narrative:     Performed at:  [address] [address]   Phone [phone]   CBC WITH AUTO DIFFERENTIAL    Narrative:     Performed at:  [facility]  [address] [facility] [place] [address]   Phone [phone]       All other labs were within normal range or not returned as of this dictation. EKG: All EKG's are interpreted by the Emergency Department Physician who eithersigns or Co-signs this chart in the absence of a cardiologist.      RADIOLOGY:   Non-plain film images such as CT, Ultrasound and MRI are read by the radiologist. Plain radiographic images are visualized by myself. *    Interpretation per the Radiologist below, if available at the time of this note:    CT Head WO Contrast   Final Result   No acute intracranial abnormality. PROCEDURES   Unless otherwise noted below, none     Procedures    *    CRITICAL CARE TIME   N/A      EMERGENCY DEPARTMENT COURSE and DIFFERENTIALDIAGNOSIS/MDM:   Vitals:    Vitals:    02/17/20 2152   BP: (!) 149/109   Pulse: 84   Resp: 18   Temp: 98.8 °F (37.1 °C)   TempSrc: Oral   SpO2: 98%   Weight: 220 lb 10.9 oz (100.1 kg)   Height: 5' 7\" (1.702 m)       Patient was given thefollowing medications:  Medications   HYDROmorphone (DILAUDID) injection 0.5 mg (0.5 mg Intravenous Given 2/17/20 2238)   ondansetron (ZOFRAN) injection 4 mg (4 mg Intravenous Given 2/17/20 2240)           The patient tolerated their visit well. The patient and / or the familywere informed of the results of any tests, a time was given to answer questions. FINAL IMPRESSION      1. Migraine without aura and with status migrainosus, not intractable          DISPOSITION/PLAN   DISPOSITION Decision To Discharge 02/17/2020 11:16:18 PM  The patient presents with a benign-appearing headache. The neurologic examination is normal.  My suspicion for serious pathology is low given a lack of significant risk factors and reassuring history and physical examination. I see nothing to suggest subarachnoid hemorrhage, meningitis, encephalitis, mass lesion, bleeding or thrombosis.   I feel the patient can be safely discharged to home with outpatient follow up. Instructions have been given for the patient to return if there is any significant worsening of the headache or the development of confusion, vision change, weakness, numbness, difficulty with speech or walking. PATIENT REFERRED TO:  [facility]) Pre-Services  [phone]          DISCHARGE MEDICATIONS:  New Prescriptions    BUTALBITAL-ASPIRIN-CAFFEINE (FIORINAL) -40 MG PER CAPSULE    Take 1 capsule by mouth every 4 hours as needed for Headaches for up to 9 days.     NAPROXEN (NAPROSYN) 500 MG TABLET    Take 1 tablet by mouth 2 times daily for 20 doses       DISCONTINUED MEDICATIONS:  Discontinued Medications    NAPROXEN (NAPROSYN) 500 MG TABLET    Take 1 tablet by mouth 2 times daily              (Please note that portions of this note were completed with a voice recognition program.  Efforts were made to edit the dictations but occasionally words are mis-transcribed.)    Mayank Reed MD (electronically signed)      Mayank Reed MD  02/17/20 5251

## 2021-06-01 NOTE — ED PROVIDER NOTES
IUD Insertion Procedure Note    Pre-operative Diagnosis: Contraception, Kyleena IUD Insertion    Post-operative Diagnosis: same    Indications: contraception    Procedure Details   Urine pregnancy test was done and result was negative.  The risks (including infection, bleeding, pain, and uterine perforation) and benefits of the procedure were explained to the patient and Written informed consent was obtained.      Cervix cleansed with Betadine. Uterus sounded to 8 cm. Kyleena IUD inserted without difficulty. String visible and trimmed. Patient tolerated procedure well.    Condition:  Stable    Complications:  None    Plan:  The patient was advised to call for any fever or for prolonged or severe pain or bleeding. She was advised to use OTC analgesics as needed for mild to moderate pain.  She was advised to feel for the strings in 2 weeks or come in if she is unable to feel the strings.         reviewed and negative. PAST MEDICAL HISTORY     Past Medical History:   Diagnosis Date    Arthritis     Asthma     Hyperlipidemia          SURGICAL HISTORY     Past Surgical History:   Procedure Laterality Date    APPENDECTOMY      HYSTERECTOMY      TUBAL LIGATION           CURRENTMEDICATIONS       Previous Medications    ACETAMINOPHEN (APAP EXTRA STRENGTH) 500 MG TABLET    Take 2 tablets by mouth every 6 hours as needed for Pain DO NOT TAKE WITH OTHER MEDICATIONS CONTAINING ACETAMINOPHEN. ATORVASTATIN (LIPITOR) 20 MG TABLET    Take 20 mg by mouth    IBUPROFEN (ADVIL;MOTRIN) 600 MG TABLET    Take 1 tablet by mouth every 6 hours as needed for Pain    MAGIC MOUTHWASH (MIRACLE MOUTHWASH)    Swish and spit 5 mLs 4 times daily as needed for Pain Equal parts 2% lidocaine, dIphenhydramine, antacid. PSEUDOEPHEDRINE (DECONGESTANT) 30 MG TABLET    Take 2 tablets by mouth every 6 hours as needed for Congestion         ALLERGIES     Codeine; Flexeril [cyclobenzaprine]; and Tramadol    FAMILYHISTORY       Family History   Problem Relation Age of Onset    High Blood Pressure Mother     High Cholesterol Mother     Diabetes Father           SOCIAL HISTORY       Social History     Socioeconomic History    Marital status: Single     Spouse name: Not on file    Number of children: Not on file    Years of education: Not on file    Highest education level: Not on file   Occupational History    Not on file   Social Needs    Financial resource strain: Not on file    Food insecurity:     Worry: Not on file     Inability: Not on file    Transportation needs:     Medical: Not on file     Non-medical: Not on file   Tobacco Use    Smoking status: Current Every Day Smoker     Packs/day: 0.25     Years: 9.00     Pack years: 2.25     Types: Cigarettes    Smokeless tobacco: Never Used   Substance and Sexual Activity    Alcohol use:  Yes    Drug use: No    Sexual activity: Not Currently   Lifestyle    Physical activity:     Days per week: Not on file     Minutes per session: Not on file    Stress: Not on file   Relationships    Social connections:     Talks on phone: Not on file     Gets together: Not on file     Attends Anabaptism service: Not on file     Active member of club or organization: Not on file     Attends meetings of clubs or organizations: Not on file     Relationship status: Not on file    Intimate partner violence:     Fear of current or ex partner: Not on file     Emotionally abused: Not on file     Physically abused: Not on file     Forced sexual activity: Not on file   Other Topics Concern    Not on file   Social History Narrative    Not on file       SCREENINGS             PHYSICAL EXAM    (up to 7 for level 4, 8 or more for level 5)     ED Triage Vitals [04/24/19 2030]   BP Temp Temp Source Pulse Resp SpO2 Height Weight   (!) 126/94 98.2 °F (36.8 °C) Oral 102 20 99 % -- 219 lb 12.8 oz (99.7 kg)       Physical Exam   Constitutional: She is oriented to person, place, and time. She appears well-developed and well-nourished. Non-toxic appearance. She does not have a sickly appearance. She does not appear ill. No distress. She is not intubated. Patient has mildly tachycardic at 102, normotensive, afebrile and satting 99% on room air. HENT:   Head: Normocephalic and atraumatic. Eyes: Conjunctivae are normal. Right eye exhibits no discharge. Left eye exhibits no discharge. Neck: Normal range of motion. Neck supple. No JVD present. No tracheal deviation present. Cardiovascular: Normal rate, intact distal pulses and normal pulses. Exam reveals no decreased pulses. Pulmonary/Chest: Effort normal. No accessory muscle usage. No apnea, no tachypnea and no bradypnea. She is not intubated. No respiratory distress. Musculoskeletal: Normal range of motion. She exhibits no edema, tenderness or deformity. Neurological: She is alert and oriented to person, place, and time. She has normal strength. She is not disoriented. No cranial nerve deficit or sensory deficit. She displays a negative Romberg sign. GCS eye subscore is 4. GCS verbal subscore is 5. GCS motor subscore is 6. Patient was able to walk from the lobby back to her room without any difficulty or assistance needed. Skin: Skin is warm and dry. Capillary refill takes 2 to 3 seconds. No rash noted. She is not diaphoretic. No erythema. No pallor. Patient has a very large 2 cm in diameter abscess to the proximal portion of her gluteal cleft. There is a pocket of serous fluid noted on top of this abscess. There is surrounding erythema to it. It is not warm or hot to the touch. It is tender to the touch. It is not openly draining. Psychiatric: She has a normal mood and affect. Her speech is normal and behavior is normal. Judgment and thought content normal. Cognition and memory are normal.   Nursing note and vitals reviewed.                     DIAGNOSTIC RESULTS   LABS:    Labs Reviewed   CBC WITH AUTO DIFFERENTIAL - Abnormal; Notable for the following components:       Result Value    WBC 17.5 (*)     RBC 3.73 (*)     Hemoglobin 11.7 (*)     Hematocrit 35.8 (*)     Neutrophils # 12.3 (*)     All other components within normal limits    Narrative:     Performed at:  [facility]  [address] [facility] [address] [facility]   Phone [phone]   COMPREHENSIVE METABOLIC PANEL W/ REFLEX TO MG FOR LOW K - Abnormal; Notable for the following components:    Glucose 100 (*)     Albumin/Globulin Ratio 0.9 (*)     All other components within normal limits    Narrative:     Performed at:  [facility]  [address] [facility]   Phone [phone]   CULTURE BLOOD #1   CULTURE BLOOD #2   WOUND CULTURE   LACTIC ACID, PLASMA    Narrative:     Performed at:  [facility]  [address] [place], [place] [address]   Phone [phone]       All other labs were within normal range or not returned as of this dictation. EKG: All EKG's are interpreted by the Emergency Department Physician who either signs orCo-signs this chart in the absence of a cardiologist.  Please see their note for interpretation of EKG. RADIOLOGY:   Non-plain film images such as CT, Ultrasound and MRI are read by the radiologist. Plain radiographic images are visualized andpreliminarily interpreted by the  ED Provider with the below findings:        Interpretation perthe Radiologist below, if available at the time of this note:    No orders to display     No results found. PROCEDURES   Unless otherwise noted below, none     Incision/Drainage  Date/Time: 4/24/2019 11:45 PM  Performed by: MUSA Rouse - CNP  Authorized by: Karma Del Rio MD     Consent:     Consent obtained:  Verbal    Consent given by:  Patient    Risks discussed:  Bleeding, incomplete drainage, pain, damage to other organs and infection    Alternatives discussed:  No treatment, delayed treatment, observation and referral  Location:     Type:  Abscess    Size:  2in in diameter    Location:  Anogenital    Anogenital location:  Gluteal cleft  Pre-procedure details:     Skin preparation:  Betadine  Anesthesia (see MAR for exact dosages):      Anesthesia method:  Local infiltration and topical application    Topical anesthetic:  LET    Local anesthetic:  Lidocaine 1% w/o epi  Procedure type:     Complexity:  Simple  Procedure details:     Needle aspiration: no      Incision types:  Single straight    Incision depth:  Dermal    Scalpel blade:  11    Wound management:  Probed and deloculated, irrigated with saline and extensive cleaning    Drainage:  Purulent and serous (There was a superficial pocket of serous fluid that was first drained, then the primary abscess did produce purulent drainage)    Drainage amount:  Copious    Wound treatment:  Wound left open tolerated by with some difficulty. There was a copious amount of drainage, as stated above a swab was taken and sent for coloring of this drainage. Patient is reporting much improved pain after the incision and drainage as well as medications that she was given here in the ED. I left the wound open, she did not require packing. I instructed her to return either to the ED or to the primary care provider I'm referring her to in the next 3 days for wound recheck. She will be placed on a course of Keflex and Bactrim for the abscess and surrounding cellulitis. She is instructed on site care as well as strict return parameters including new or worsening symptoms such as fevers, chills, nausea vomiting, increased or worsening pain. Patient verbalized understanding of all the above with no further questions or concerns. As patient remained hemodynamically stable with adequate pain control here in the ED I will discharge patient home in stable condition. FINAL IMPRESSION      1. Abscess of buttock    2.  Cellulitis of buttock          DISPOSITION/PLAN   DISPOSITION        PATIENT REFERREDTO:  [address] [place] [address] Pre-Services  [phone]  Schedule an appointment as soon as possible for a visit in 3 days  For wound re-check    [address] [place] [address]  [phone]  Go in 3 days  For wound re-check, If symptoms worsen      DISCHARGE MEDICATIONS:  New Prescriptions    CEPHALEXIN (KEFLEX) 500 MG CAPSULE    Take 1 capsule by mouth 4 times daily for 7 days    SULFAMETHOXAZOLE-TRIMETHOPRIM (BACTRIM DS) 800-160 MG PER TABLET    Take 1 tablet by mouth 2 times daily for 7 days       DISCONTINUED MEDICATIONS:  Discontinued Medications    No medications on file              (Please note that portions ofthis note were completed with a voice recognition program.  Efforts were made to edit the dictations but occasionally words are mis-transcribed.)    MUSA Lazar - CNP (electronically signed)           MUSA Gibson - EDWIN  04/24/19 4991

## 2021-12-02 ENCOUNTER — HOSPITAL ENCOUNTER (EMERGENCY)
Age: 41
Discharge: HOME | End: 2021-12-02
Attending: EMERGENCY MEDICINE
Payer: COMMERCIAL

## 2021-12-02 VITALS
HEART RATE: 90 BPM | OXYGEN SATURATION: 97 % | RESPIRATION RATE: 18 BRPM | TEMPERATURE: 97.8 F | SYSTOLIC BLOOD PRESSURE: 130 MMHG | DIASTOLIC BLOOD PRESSURE: 86 MMHG

## 2021-12-02 DIAGNOSIS — M54.50 ACUTE MIDLINE LOW BACK PAIN WITHOUT SCIATICA: Primary | ICD-10-CM

## 2021-12-02 DIAGNOSIS — L73.2 HIDRADENITIS: ICD-10-CM

## 2021-12-02 PROCEDURE — 6370000000 HC RX 637 (ALT 250 FOR IP): Performed by: EMERGENCY MEDICINE

## 2021-12-02 PROCEDURE — 99284 EMERGENCY DEPT VISIT MOD MDM: CPT

## 2021-12-02 RX ORDER — METHOCARBAMOL 500 MG/1
500 TABLET, FILM COATED ORAL 4 TIMES DAILY
Qty: 40 TABLET | Refills: 0 | Status: SHIPPED | OUTPATIENT
Start: 2021-12-02 | End: 2021-12-02 | Stop reason: SDUPTHER

## 2021-12-02 RX ORDER — METHOCARBAMOL 750 MG/1
1500 TABLET, FILM COATED ORAL ONCE
Status: COMPLETED | OUTPATIENT
Start: 2021-12-02 | End: 2021-12-02

## 2021-12-02 RX ORDER — SULFAMETHOXAZOLE AND TRIMETHOPRIM 800; 160 MG/1; MG/1
1 TABLET ORAL 2 TIMES DAILY
Qty: 14 TABLET | Refills: 0 | Status: SHIPPED | OUTPATIENT
Start: 2021-12-02 | End: 2021-12-02 | Stop reason: SDUPTHER

## 2021-12-02 RX ORDER — IBUPROFEN 600 MG/1
600 TABLET ORAL ONCE
Status: COMPLETED | OUTPATIENT
Start: 2021-12-02 | End: 2021-12-02

## 2021-12-02 RX ORDER — SULFAMETHOXAZOLE AND TRIMETHOPRIM 800; 160 MG/1; MG/1
1 TABLET ORAL 2 TIMES DAILY
Qty: 14 TABLET | Refills: 0 | Status: SHIPPED | OUTPATIENT
Start: 2021-12-02 | End: 2021-12-09

## 2021-12-02 RX ORDER — SULFAMETHOXAZOLE AND TRIMETHOPRIM 800; 160 MG/1; MG/1
1 TABLET ORAL ONCE
Status: COMPLETED | OUTPATIENT
Start: 2021-12-02 | End: 2021-12-02

## 2021-12-02 RX ORDER — IBUPROFEN 600 MG/1
600 TABLET ORAL EVERY 8 HOURS PRN
Qty: 15 TABLET | Refills: 0 | Status: SHIPPED | OUTPATIENT
Start: 2021-12-02 | End: 2022-08-25

## 2021-12-02 RX ORDER — METHOCARBAMOL 500 MG/1
500 TABLET, FILM COATED ORAL 4 TIMES DAILY
Qty: 40 TABLET | Refills: 0 | Status: SHIPPED | OUTPATIENT
Start: 2021-12-02 | End: 2021-12-12

## 2021-12-02 RX ORDER — IBUPROFEN 600 MG/1
600 TABLET ORAL EVERY 8 HOURS PRN
Qty: 15 TABLET | Refills: 0 | Status: SHIPPED | OUTPATIENT
Start: 2021-12-02 | End: 2021-12-02 | Stop reason: SDUPTHER

## 2021-12-02 RX ADMIN — METHOCARBAMOL TABLETS 1500 MG: 750 TABLET, COATED ORAL at 07:36

## 2021-12-02 RX ADMIN — IBUPROFEN 600 MG: 600 TABLET ORAL at 07:36

## 2021-12-02 RX ADMIN — SULFAMETHOXAZOLE AND TRIMETHOPRIM 1 TABLET: 800; 160 TABLET ORAL at 07:36

## 2021-12-02 ASSESSMENT — ENCOUNTER SYMPTOMS
VOMITING: 0
TROUBLE SWALLOWING: 0
NAUSEA: 0
SHORTNESS OF BREATH: 0
BACK PAIN: 1
DIARRHEA: 0
VOICE CHANGE: 0

## 2021-12-02 ASSESSMENT — PAIN DESCRIPTION - DESCRIPTORS
DESCRIPTORS: ACHING
DESCRIPTORS: DISCOMFORT
DESCRIPTORS: DISCOMFORT

## 2021-12-02 ASSESSMENT — PAIN SCALES - GENERAL
PAINLEVEL_OUTOF10: 4
PAINLEVEL_OUTOF10: 3

## 2021-12-02 ASSESSMENT — PAIN DESCRIPTION - PROGRESSION
CLINICAL_PROGRESSION: GRADUALLY WORSENING
CLINICAL_PROGRESSION: NOT CHANGED
CLINICAL_PROGRESSION: GRADUALLY IMPROVING

## 2021-12-02 ASSESSMENT — PAIN DESCRIPTION - FREQUENCY
FREQUENCY: CONTINUOUS

## 2021-12-02 ASSESSMENT — PAIN - FUNCTIONAL ASSESSMENT: PAIN_FUNCTIONAL_ASSESSMENT: 0-10

## 2021-12-02 ASSESSMENT — PAIN DESCRIPTION - PAIN TYPE
TYPE: ACUTE PAIN

## 2021-12-02 ASSESSMENT — PAIN DESCRIPTION - ONSET
ONSET: GRADUAL
ONSET: GRADUAL

## 2021-12-02 ASSESSMENT — PAIN DESCRIPTION - ORIENTATION
ORIENTATION: LOWER
ORIENTATION: LOWER

## 2021-12-02 ASSESSMENT — PAIN DESCRIPTION - LOCATION
LOCATION: BACK

## 2021-12-02 NOTE — Clinical Note
Scooter Duncan was seen and treated in our emergency department on 12/2/2021. She may return to work on 12/03/2021. If you have any questions or concerns, please don't hesitate to call.       Koffi Green MD

## 2021-12-02 NOTE — ED PROVIDER NOTES
[id number] [facility]  eMERGENCY dEPARTMENT eNCOUnter      Pt Name: Darwin Jameson  MRN: [id number]  [place] 1980  Date of evaluation: 12/2/2021  Provider: Ross Mcclendon MD    [address] [place] [address]       Chief Complaint   Patient presents with    Back Pain         HISTORY OF PRESENT ILLNESS   (Location/Symptom, Timing/Onset, Context/Setting, Quality, Duration, Modifying Factors, Severity)  Note limiting factors. Darwin Jameson is a 36 y.o. female with hx of hidradenitis and previous episodes of back pain who reports lower back pain starting this morning. Patient reports she bent over to  a heavy laundry basket and felt immediate pain and felt that her back \"locked up\". Patient denies any leg numbness or weakness, urinary or bowel incontinence, radiation of pain down her leg but does report midline lower back pain worse with lifting and bending and better with rest.  She denies any fever or neurologic deficits. HPI    Nursing Notes were reviewed. REVIEW OFSYSTEMS    (2-9 systems for level 4, 10 or more for level 5)     Review of Systems   Constitutional: Negative for appetite change and fever. HENT: Negative for trouble swallowing and voice change. Eyes: Negative for visual disturbance. Respiratory: Negative for shortness of breath. Cardiovascular: Negative for chest pain and palpitations. Gastrointestinal: Negative for diarrhea, nausea and vomiting. Genitourinary: Negative for dysuria. Musculoskeletal: Positive for back pain. Negative for gait problem. Neurological: Negative for seizures and syncope. Psychiatric/Behavioral: Negative for self-injury and suicidal ideas. Except as noted above the remainder of the review of systems was reviewed and negative.        PAST MEDICAL HISTORY     Past Medical History:   Diagnosis Date    Arthritis     Asthma     Back pain     Depression     Hyperlipidemia     Neck pain     PMDD (premenstrual dysphoric disorder)     STD (female)          SURGICAL HISTORY       Past Surgical History:   Procedure Laterality Date    APPENDECTOMY      CHOLECYSTECTOMY, LAPAROSCOPIC N/A 10/23/2019    ROBOTIC-ASSISTED LAPAROSCOPIC CHOLECYSTECTOMY WITH CHOLANGIOGRAM WITH C-ARM, performed by Connie Powell MD at [facility]       Previous Medications    ALBUTEROL SULFATE  (90 BASE) MCG/ACT INHALER    Inhale 2 puffs into the lungs    BUTALBITAL-ASPIRIN-CAFFEINE (FIORINAL) -40 MG PER CAPSULE    Take 1 capsule by mouth every 4 hours as needed for Headaches for up to 9 days. CHOLESTYRAMINE (QUESTRAN) 4 G PACKET    Take 1 packet by mouth 3 times daily    DICYCLOMINE (BENTYL) 10 MG CAPSULE    Take by mouth 3 times daily as needed (bowel spasm)     MOMETASONE (ASMANEX) 110 MCG/INH AEPB    Inhale 2 puffs into the lungs 2 times daily    NAPROXEN (NAPROSYN) 500 MG TABLET    Take 1 tablet by mouth 2 times daily for 20 doses    ONDANSETRON (ZOFRAN ODT) 4 MG DISINTEGRATING TABLET    Take 1 tablet by mouth every 8 hours as needed for Nausea or Vomiting    PAROXETINE (PAXIL) 10 MG TABLET    Take 10 mg by mouth    PSYLLIUM (METAMUCIL) 28.3 % POWD    Take 7.5 g by mouth 2 times daily Please give 660 g or equivalent bottle.   Thanks       ALLERGIES     Codeine, Flexeril [cyclobenzaprine], and Tramadol    FAMILY HISTORY       Family History   Problem Relation Age of Onset    High Blood Pressure Mother     High Cholesterol Mother     Diabetes Father           SOCIAL HISTORY       Social History     Socioeconomic History    Marital status: Single     Spouse name: Not on file    Number of children: Not on file    Years of education: Not on file    Highest education level: Not on file   Occupational History    Not on file   Tobacco Use    Smoking status: Current Every Day Smoker     Packs/day: 0.25     Years: 9.00     Pack years: 2.25     Types: Cigarettes    Smokeless tobacco: Never Used   Vaping Use    Vaping Use: Never used   Substance and Sexual Activity    Alcohol use: Yes     Comment: occasionally    Drug use: No    Sexual activity: Not Currently   Other Topics Concern    Not on file   Social History Narrative    Not on file     Social Determinants of Health     Financial Resource Strain:     Difficulty of Paying Living Expenses: Not on file   Food Insecurity:     Worried About Running Out of Food in the Last Year: Not on file    Zen of Food in the Last Year: Not on file   Transportation Needs:     Lack of Transportation (Medical): Not on file    Lack of Transportation (Non-Medical): Not on file   Physical Activity:     Days of Exercise per Week: Not on file    Minutes of Exercise per Session: Not on file   Stress:     Feeling of Stress : Not on file   Social Connections:     Frequency of Communication with Friends and Family: Not on file    Frequency of Social Gatherings with Friends and Family: Not on file    Attends Christianity Services: Not on file    Active Member of [address] [place] [address] [employer] or Organizations: Not on file    Attends Club or Organization Meetings: Not on file    Marital Status: Not on file   Intimate Partner Violence:     Fear of Current or Ex-Partner: Not on file    Emotionally Abused: Not on file    Physically Abused: Not on file    Sexually Abused: Not on file   Housing Stability:     Unable to Pay for Housing in the Last Year: Not on file    Number of [place] in the Last Year: Not on file    Unstable Housing in the Last Year: Not on file         PHYSICAL EXAM    (up to 7 for level 4, 8 or more for level 5)     ED Triage Vitals [12/02/21 0652]   BP Temp Temp Source Pulse Resp SpO2 Height Weight   130/86 97.8 °F (36.6 °C) Oral 90 18 97 % -- --       Physical Exam  Constitutional:       General: She is not in acute distress. Appearance: She is well-developed. Comments: Pleasant and cooperative. Nontoxic-appearing.   In no acute distress. HENT:      Head: Normocephalic and atraumatic. Eyes:      Conjunctiva/sclera: Conjunctivae normal.   Neck:      Vascular: No JVD. Cardiovascular:      Rate and Rhythm: Normal rate. Pulmonary:      Effort: Pulmonary effort is normal. No respiratory distress. Abdominal:      Tenderness: There is no abdominal tenderness. There is no rebound. Musculoskeletal:         General: Tenderness present. No deformity. Comments: Midline and bilateral paraspinal lumbar tenderness to palpation with no palpable deformity. Skin:     Comments: Skin changes to right gluteal cleft consistent with early developing abscess. Neurological:      Mental Status: She is alert. Comments: Patient alert and oriented. Normal mental status. No saddle anesthesia. Sensation intact in all nerve distributions of bilateral lower extremities. 5 out of 5 strength in bilateral lower extremities. Patient ambulatory on own power. 2+ patellar reflexes equal bilaterally. DIAGNOSTIC RESULTS     LABS:  Labs Reviewed - No data to display    All otherlabs were within normal range or not returned as of this dictation. EMERGENCY DEPARTMENT COURSE and DIFFERENTIAL DIAGNOSIS/MDM:   Vitals:    Vitals:    12/02/21 0652   BP: 130/86   Pulse: 90   Resp: 18   Temp: 97.8 °F (36.6 °C)   TempSrc: Oral   SpO2: 97%         MDM  All vital signs are within normal limits. No red flag signs of neurologic deficit or CNS compression on history or physical exam.  Primarily suspect musculoskeletal back pain based on history provided by patient of pain starting when she was bending over to  a laundry basket. No evidence of CNS compression on physical exam at this time. Feel patient is appropriate for NSAIDs, muscle relaxers, stretches, and primary care follow-up. Patient also has small early developing abscess consistent with her previous hidradenitis.   We will place her on Bactrim, recommend warm sits baths, and primary care follow-up. Strict ER return precautions given for fever or other symptoms. Patient expresses understanding and agreement with this plan and is discharged home. I estimate there is low risk for Abdominal aortic aneurysm, cauda equina syndrome, epidural mass lesion, thus I consider the discharge disposition reasonable. We have discussed the diagnosis and risks, and we agree with discharging home to follow-up with their primary doctor. We also discussed returning to the Emergency Department immediately if new or worsening symptoms occur. We have discussed the symptoms which are most concerning (e.g., saddle anesthesia, urinary or bowel incontinence or retention, changing or worsening pain) that necessitate immediate return. Procedures    FINAL IMPRESSION      1. Acute midline low back pain without sciatica    2. Hidradenitis          DISPOSITION/PLAN   DISPOSITION  Discharge      PATIENT REFERRED TO:  [address]  [address] [place] [address]    In 1 week      Dese[address] TORRES [address]  [facility] [phone]    If symptoms worsen      DISCHARGE MEDICATIONS:  New Prescriptions    IBUPROFEN (ADVIL;MOTRIN) 600 MG TABLET    Take 1 tablet by mouth every 8 hours as needed for Pain    METHOCARBAMOL (ROBAXIN) 500 MG TABLET    Take 1 tablet by mouth 4 times daily for 10 days          (Please note that portions of this note were completed with a voice recognition program.  Efforts were made to edit the dictations but occasionally words aremis-transcribed. )    José Luis Darnell MD (electronically signed)  Attending Emergency Physician         José Luis Darnell MD  12/02/21 5546

## 2021-12-02 NOTE — Clinical Note
Dylon Toro was seen and treated in our emergency department on 12/2/2021. She may return to work on 12/03/2021. If you have any questions or concerns, please don't hesitate to call.       Liz Denis MD

## 2021-12-02 NOTE — ED NOTES
Discharge and education instructions reviewed. Patient verbalized understanding, teach-back successful. Patient denied questions at this time. No acute distress noted. Patient instructed to follow-up as noted - return to emergency department if symptoms worsen. Patient verbalized understanding. Discharged per EDMD with discharge instructions.         Kaylin Santana RN  12/02/21 4357

## 2021-12-15 ENCOUNTER — HOSPITAL ENCOUNTER (OUTPATIENT)
Dept: PHYSICAL THERAPY | Age: 41
Setting detail: THERAPIES SERIES
Discharge: HOME | End: 2021-12-15
Payer: COMMERCIAL

## 2021-12-15 PROCEDURE — 97161 PT EVAL LOW COMPLEX 20 MIN: CPT

## 2021-12-15 PROCEDURE — 97110 THERAPEUTIC EXERCISES: CPT

## 2021-12-15 PROCEDURE — G0283 ELEC STIM OTHER THAN WOUND: HCPCS

## 2021-12-15 PROCEDURE — 97140 MANUAL THERAPY 1/> REGIONS: CPT

## 2021-12-15 NOTE — FLOWSHEET NOTE
[facility], [facility]  [address] [facility], [facility]  Phone: [phone]   Fax:     [phone]        Physical Therapy Treatment Note/ Progress Report:       Date:  12/15/2021    Patient Name:  Tata Pantoja    :  1980  MRN: [id number]    Pertinent Medical History:asthma, depression    Medical/Treatment Diagnosis Information:  · Diagnosis: S/P R shoulder scope with SAD,DCE, RTC debridement, and labral repair  · Treatment Diagnosis: Decreased mobility, strength    Insurance/Certification information:  PT Insurance Information: [facility]  Physician Information:  Referring Practitioner: Joleen Hernandez MD  Plan of care signed (Y/N): sent for cosign     Date of Patient follow up with Physician:      Progress Report: []  Yes  [x]  No     Date Range for reporting period:  Beginnin/15/2021  Ending:      Progress report due (10 Rx/or 30 days whichever is less):     Recertification due (POC duration/ or 90 days whichever is less): 21    Visit # POC/Insurance Allowable Auth Needed   1 eval only [x]Yes    []No     Functional Outcomes Measure:    Date Assessed: at eval  Test: UEFS  Score: 23/80 = 71% disability    Pain level:  4-7/10     History of Injury: Patient underwent L shoulder scope with SAD, DCE, RTC debridement, and labral repair/debridement on 21. Patient notes no complications with surgery. Currently wearing sling at all times when she is up and moving. Patient notes taking her sling off when she is relaxing at home. Currently getting assistance from family with ADLs like dressing and cooking. Patient has also been icing her shoulder as needed. SUBJECTIVE:  See eval    OBJECTIVE:    Observation:   · Palpation: mod TTP AC joint, bicep tendon, UT  · Functional Mobility/Transfers: Pain in shoulder with going from sit to supine on table.    · Posture: Pt to PT with sling on. Needs cueing to relax UT with arm at side. · Bandages/Dressings/Incisions: stitches in place with no signs of infection and covered with bandaids.  Test measurements:    ROM:  Date      Shldr flexion    Shldr abd  Shldr IR         Shldr ER   A P A P A P A P   Eval 12/15  15  50  40  40                Strength:  Date Shoulder flexion Shoulder abduction Shoulder IR Shoulder  ER Bicep   Eval 12/15 NT NT NT NT NT           NT=not tested due to MD protocol    RESTRICTIONS/PRECAUTIONS: Pt s/p L shoulder scope with SAD, DCE, RTC debridement, and labral repair/debridement on 12/13/21. Call placed 12/15 to MD concerning restrictions, sling use, and PT protocol    Exercises/Interventions:   Therapeutic Ex (37109)  Min: 10 Resistance/Reps Notes/Cues   Elbow AROM:   *flexion/extension 0# 10x L    Forearm AROM: *supination/pronation 10x L    Wrist AROM:  *flexion / extension 10x L    Hand gripper all Red 10x         Tableslides:  *flexion  *scaption   10x  Add     Supine cane:  *ER   add         Therapeutic Activity (50978) Min:                     NMR re-education (89160)  Min: 5      Shoulder shrugs 10x    Scap squeeze 10x  in seated position, cues to relax UT        Manual Intervention (86008) Min: 15     Shoulder PROM 4-way x15 mins - gentle              Modalities:  Min 15     IFC with CP x15 mins L shoulder Seated in recliner          Other Therapeutic Activities:  Pt was educated on PT POC, Diagnosis, Prognosis, pathomechanics as well as frequency and duration of scheduling future physical therapy appointments. Time was also taken on this day to answer all patient questions and participation in PT. Reviewed appointment policy in detail with patient and patient verbalized understanding.      Home Exercise Program: Patient instructed in the following for HEP:           Access Code: [email or web address]  Seated Shoulder Shrugs - 2 x daily - 7 x weekly - 2 sets - 10 reps  Seated Scapular Retraction - 2 x daily - 7 x weekly - 1 sets - 10 reps  Bicep curls - 2 x daily - 7 x weekly - 2 sets - 10 reps  Composite Wrist and Finger Flexion and Extension - 2 x daily - 7 x weekly - 2 sets - 10 reps  Seated Forearm Pronation Supination AROM - 2 x daily - 7 x weekly - 2 sets - 10 reps  Towel Roll Squeeze - 2 x daily - 7 x weekly - 2 sets - 10 reps  Seated Shoulder Flexion Towel Slide at Table Top - 2 x daily - 7 x weekly - 1 sets - 10 reps  Patient verbalized/demonstrated understanding and was issued written handout. Therapeutic Exercise and NMR EXR  [x] (34021) Provided verbal/tactile cueing for activities related to strengthening, flexibility, endurance, ROM  for improvements in scapular, scapulothoracic and UE control with self care, reaching, carrying, lifting, house/yardwork, driving/computer work.    [] (16052) Provided verbal/tactile cueing for activities related to improving balance, coordination, kinesthetic sense, posture, motor skill, proprioception  to assist with  scapular, scapulothoracic and UE control with self care, reaching, carrying, lifting, house/yardwork, driving/computer work. Therapeutic Activities:    [] (89224 or 66557) Provided verbal/tactile cueing for activities related to improving balance, coordination, kinesthetic sense, posture, motor skill, proprioception and motor activation to allow for proper function of scapular, scapulothoracic and UE control with self care, carrying, lifting, driving/computer work.      Home Exercise Program:    [x] (73054) Reviewed/Progressed HEP activities related to strengthening, flexibility, endurance, ROM of scapular, scapulothoracic and UE control with self care, reaching, carrying, lifting, house/yardwork, driving/computer work  [] (72375) Reviewed/Progressed HEP activities related to improving balance, coordination, kinesthetic sense, posture, motor skill, proprioception of scapular, scapulothoracic and UE control with self care, reaching, carrying, lifting, house/yardwork, driving/computer work      Manual Treatments:  PROM / STM / Oscillations-Mobs:  G-I, II, III, IV (PA's, Inf., Post.)  [x] (32469) Provided manual therapy to mobilize soft tissue/joints of cervical/CT, scapular GHJ and UE for the purpose of modulating pain, promoting relaxation,  increasing ROM, reducing/eliminating soft tissue swelling/inflammation/restriction, improving soft tissue extensibility and allowing for proper ROM for normal function with self care, reaching, carrying, lifting, house/yardwork, driving/computer work    Charges:  Timed Code Treatment Minutes: 30   Total Treatment Minutes: 55       [x] EVAL (LOW) 58663 (typically 20 minutes face-to-face)  [] EVAL (MOD) 12652 (typically 30 minutes face-to-face)  [] EVAL (HIGH) 34484 (typically 45 minutes face-to-face)  [] RE-EVAL     [x] PP(52641) x     [] Dry needle 1 or 2 Muscles (22100)  [] NMR (44885) x     [] Dry needle 3+ Muscles (59629)  [x] Manual (81608) x     [] Ultrasound (82355) x  [] TA (75460) x     [] Mech Traction (76046)  [] ES(attended) (55830)     [x] ES (un) (57334):   [] Vasopump (56506) [] Ionto (59602)   [] Other:    Approval Dates:  CPT Code Units Approved Units Used  Date Updated:                     GOALS:  Patient stated goal: \"Be able to return to work\"  []? Progressing: []? Met: []? Not Met: []? Adjusted     Therapist goals for Patient:   Short Term Goals: To be achieved in: 2 weeks  1. Independent in HEP and progression per patient tolerance, in order to prevent re-injury. []? Progressing: []? Met: []? Not Met: []? Adjusted  2. Patient will have a decrease in painby 40% to facilitate improvement in movement, function, and ADLs as indicated by Functional Deficits. []? Progressing: []? Met: []? Not Met: []? Adjusted     Long Term Goals: To be achieved in: at discharge  1. Disability index score of 30% or less for the UEFS to assist with reaching prior level of function. []? Progressing: []? Met: []?  Not Met: []? Adjusted  2. Patient will demonstrate increased AROM to flex/abd = 160 and ER= 75 to allow for proper joint functioning as indicated by Functional Deficits. []? Progressing: []? Met: []? Not Met: []? Adjusted  3. Patient will demonstrate an increase in NM recruitment/activation and overall GH and scapular strength to 4/5 for proper functional mobility as indicated by patients Functional Deficits. []? Progressing: []? Met: []? Not Met: []? Adjusted  4. Patient will return to dressing independently without increased symptoms or restriction. []? Progressing: []? Met: []? Not Met: []? Adjusted  5. Patient will return to driving without increased symptoms or restriction. []? Progressing: []? Met: []? Not Met: []? Adjusted    ASSESSMENT:  See eval    Treatment/Activity Tolerance:  [x] Patient tolerated treatment well [] Patient limited by fatique  [] Patient limited by pain  [] Patient limited by other medical complications  [] Other:     Overall Progression Towards Functional goals/ Treatment Progress Update:  [] Patient is progressing as expected towards functional goals listed. [] Progression is slowed due to complexities/Impairments listed. [] Progression has been slowed due to co-morbidities.   [x] Plan just implemented, too soon to assess goals progression <30days   [] Goals require adjustment due to lack of progress  [] Patient is not progressing as expected and requires additional follow up with physician  [] Other    Prognosis for POC: [x] Good [] Fair  [] Poor    Patient requires continued skilled intervention: [x] Yes  [] No      PLAN: See eval  [] Continue per plan of care [] Alter current plan (see comments)  [x] Plan of care initiated [] Hold pending MD visit [] Discharge    Electronically signed by: Chepe Ojeda PT , OMT-C,  [id number]      Note: If patient does not return for scheduled/recommended follow up visits, this note will serve as a discharge from care along with the most recent update on progress.

## 2021-12-15 NOTE — PLAN OF CARE
[address] [facility], [facility]  [address] [facility], [facility]  Phone: [phone]   Fax:     [phone]                                                       Physical Therapy Certification    Dear Referring Practitioner: Silvia Ellis MD,    We had the pleasure of evaluating the following patient for physical therapy services at [facility]. A summary of our findings can be found in the initial assessment below. This includes our plan of care. If you have any questions or concerns regarding these findings, please do not hesitate to contact me at the office phone number checked above. Thank you for the referral.       Physician Signature:_______________________________Date:__________________  By signing above (or electronic signature), therapists plan is approved by physician        Patient: Tamar Hall   : 1980   MRN: [id number]  Referring Physician: Referring Practitioner: Silvia Ellis MD      Evaluation Date: 12/15/2021      Medical Diagnosis Information:  Diagnosis: S/P R shoulder scope with SAD,DCE, RTC debridement, and labral repair   Treatment Diagnosis: Decreased mobility, strength                                         Insurance information: PT Insurance Information: [employer]    Precautions/ Contra-indications: see MD protocol  Latex Allergy:   [x]  NO      []YES  Preferred Language for Healthcare:   [x]English       []other:    C-SSRS Triggered by Intake questionnaire (Past 2 wk assessment ):   [x] No, Questionnaire did not trigger screening. [x] Yes, Patient intake triggered C-SSRS Screening      [] C-SSRS Screening completed  [] PCP notified via Epic     C-SSRS Triggered by Intake questionnaire:     YES NO   In the past month, have you wished you were dead or wished you could go to sleep and not wake up?   X   In the past month, have you had any thoughts of killing yourself? X                    Lifetime   YES NO   Have you ever done anything, started to do anything, or prepared to do anything to end your life? X             Past 3 months    X     SUBJECTIVE: Patient stated complaint: Patient underwent L shoulder scope with SAD, DCE, RTC debridement, and labral repair/debridement on 12/13/21. Patient notes no complications with surgery. Currently wearing sling at all times when she is up and moving. Patient notes taking her sling off when she is relaxing at home. Currently getting assistance from family with ADLs like dressing and cooking. Patient has also been icing her shoulder as needed. Relevant Medical History:asthma, depression, OA  Functional Scale/Score: UEFS: 23/80 = 71% disability    Pain Scale: 4-7/10  Easing factors: ice, meds  Provocative factors: moving arm    Type: []Constant   [x]Intermittent  []Radiating []Localized []other:     Numbness/Tingling: pt denies    Occupation/School: works as a [employer]     Living Status/Prior Level of Function: Independent with ADLs and IADLs, unable to drive    OBJECTIVE:   Palpation: mod TTP AC joint, bicep tendon, UT    Functional Mobility/Transfers: Pain in shoulder with going from sit to supine on table. Posture: Pt to PT with sling on. Needs cueing to relax UT with arm at side. Bandages/Dressings/Incisions: stitches in place with no signs of infection and covered with bandaids. Gait: (include devices/WB status):  WNL     PROM Left Right   Shoulder Flex 15 WNL   Shoulder Abd 50 WNL   Shoulder ER 40 WNL   Shoulder IR 40 WNL   Elbow flex/ext WNL    Wrist - all  WNL    Strength  Left Right   Shoulder Flex NT    Shoulder Scap NT    Shoulder ER NT    Shoulder IR NT    bicep        Reflexes Normal Abnormal Comments   [x]ALL NORMAL            C5-6 Biceps [x] []    C6 Brachioradialis [x] []      Reflexes/Sensation:    [x]Dermatomes/Myotomes intact    [x]Reflexes equal and normal bilaterally   []Other:    Joint mobility: Not tested   []Normal    []Hypo   []Hyper    Orthopedic Special Tests:                        [x] Patient history, allergies, meds reviewed. Medical chart reviewed. See intake form. Review Of Systems (ROS):  [x]Performed Review of systems (Integumentary, CardioPulmonary, Neurological) by intake and observation. Intake form has been scanned into medical record. Patient has been instructed to contact their primary care physician regarding ROS issues if not already being addressed at this time. Co-morbidities/Complexities (which will affect course of rehabilitation):   []None           Arthritic conditions   []Rheumatoid arthritis (M05.9)  [x]Osteoarthritis (M19.91)   Cardiovascular conditions   []Hypertension (I10)  []Hyperlipidemia (E78.5)  []Angina pectoris (I20)  []Atherosclerosis (I70)  []CVA Musculoskeletal conditions   []Disc pathology   []Congenital spine pathologies   []Prior surgical intervention  []Osteoporosis (M81.8)  []Osteopenia (M85.8)   Endocrine conditions   []Hypothyroid (E03.9)  []Hyperthyroid Gastrointestinal conditions   []Constipation (H21.41)   Metabolic conditions   []Morbid obesity (E66.01)  []Diabetes type 1(E10.65) or 2 (E11.65)   []Neuropathy (G60.9)     Pulmonary conditions   [x]Asthma (J45)  []Coughing   []COPD (J44.9)   Psychological Disorders  []Anxiety (F41.9)  [x]Depression (F32.9)   []Other:   []Other:          Barriers to/and or personal factors that will affect rehab potential:              []Age  []Sex   []Smoker              []Motivation/Lack of Motivation                        [x]Co-Morbidities              []Cognitive Function, education/learning barriers              []Environmental, home barriers              [x]profession/work barriers - job requires lifting   []past PT/medical experience  []other:  Justification:     Falls Risk Assessment (30 days):   [x] Falls Risk assessed and no intervention required.   [] Falls Risk assessed and Patient requires intervention due to being higher risk   TUG score (>12s at risk):     [] Falls education provided, including         ASSESSMENT:    Functional Impairments:     []Noted spinal or UE joint hypomobility   []Noted spinal or UE joint hypermobility   [x]Decreased spinal/UE functional ROM   []Abnormal reflexes/sensation/myotomal/dermatomal deficits   [x]Decreased RC/scapular/core strength and neuromuscular control    [x]Decreased UE functional strength   []other:      Functional Activity Limitations (from functional questionnaire and intake)   [x]Reduced ability to tolerate prolonged functional positions   [x]Reduced ability or difficulty with changes of positions or transfers between positions   []Reduced ability to maintain good posture and demonstrate good body mechanics with sitting, bending, and lifting   [x] Reduced ability or tolerance with driving and/or computer work   [x]Reduced ability to perform lifting, reaching, carrying tasks   [x]Reduced ability to reach behind back   [x]Reduced ability to sleep    [x]Reduced ability to tolerate any impact through UE or spine   [x]Reduced ability to  or hold objects   [x]Reduced ability to throw or toss an object   []other:    Participation Restrictions   [x]Reduced participation in self care activities   [x]Reduced participation in home management activities   [x]Reduced participation in work activities   [x]Reduced participation in social activities. []Reduced participation in sport/recreational activities. Classification/Subgrouping:   [x]signs/symptoms consistent with post-surgical status including decreased ROM, strength and function.     []signs/symptoms consistent with joint sprain/strain    []signs/symptoms consistent with shoulder impingement (internal, external, primary or secondary)   []signs/symptoms consistent with shoulder/elbow/wrist tendinopathy   []Signs/symptoms consistent with Rotator cuff tear   []sign/symptoms consistent with labral tear   []signs/symptoms consistent with rib dysfunction   []signs/symptoms consistent with postural dysfunction   []signs/symptoms consistent with Glenohumeral IR Deficit - <45 degrees   []signs/symptoms consistent with facet dysfunction of cervical/thoracic spine   []signs/symptoms consistent with pathology which may benefit from Dry Needling   []signs/symptoms which may limit the use of advanced manual therapy techniques: (Elevated CV risk profile, recent trauma, intolerance to end range positions, prior TIA, visual issues, UE neurological compromise )     Prognosis/Rehab Potential:      [x]Excellent   []Good    []Fair   []Poor    Tolerance of evaluation/treatment:    []Excellent   []Good    [x]Fair   []Poor    Physical Therapy Evaluation Complexity Justification  [x] A history of present problem with:  [] no personal factors and/or comorbidities that impact the plan of care;  [x]1-2 personal factors and/or comorbidities that impact the plan of care  []3 personal factors and/or comorbidities that impact the plan of care  [x] An examination of body systems using standardized tests and measures addressing any of the following: body structures and functions (impairments), activity limitations, and/or participation restrictions;:  [x] a total of 1-2 or more elements   [] a total of 3 or more elements   [] a total of 4 or more elements   [x] A clinical presentation with:  [x] stable and/or uncomplicated characteristics   [] evolving clinical presentation with changing characteristics  [] unstable and unpredictable characteristics;   [x] Clinical decision making of [x] low, [] moderate, [] high complexity using standardized patient assessment instrument and/or measurable assessment of functional outcome.     [x] EVAL (LOW) 68800 (typically 20 minutes face-to-face)  [] EVAL (MOD) 00332 (typically 30 minutes face-to-face)  [] EVAL (HIGH) 09397 (typically 45 minutes face-to-face)  [] RE-EVAL     PLAN: Frequency/Duration:  2 days per week for 6 Weeks:  Interventions:  [x]  Therapeutic exercise including: strength training, ROM, for scapula, core and Upper extremity, including postural re-education. [x]  NMR activation and proprioception for UE, periscapular and RC muscles and Core, including postural re-education. [x]  Manual therapy as indicated for shoulder, scapula, spine and associated soft tissue including: Dry Needling/IASTM, STM, PROM, Gr I-IV mobilizations, manipulation. [x] Modalities as needed that may include: thermal agents, E-stim, Biofeedback, US, iontophoresis as indicated  [x] Patient education on joint protection, postural re-education, activity modification, progression of HEP. [x] Aquatic exercise including: strength training, ROM, for scapula, core and Upper extremity, including postural re-education. HEP instruction: Access Code: [email or web address]  Seated Shoulder Shrugs - 2 x daily - 7 x weekly - 2 sets - 10 reps  Seated Scapular Retraction - 2 x daily - 7 x weekly - 1 sets - 10 reps  Bicep curls - 2 x daily - 7 x weekly - 2 sets - 10 reps  Composite Wrist and Finger Flexion and Extension - 2 x daily - 7 x weekly - 2 sets - 10 reps  Seated Forearm Pronation Supination AROM - 2 x daily - 7 x weekly - 2 sets - 10 reps  Towel Roll Squeeze - 2 x daily - 7 x weekly - 2 sets - 10 reps  Seated Shoulder Flexion Towel Slide at Table Top - 2 x daily - 7 x weekly - 1 sets - 10 reps      GOALS:  Patient stated goal: \"Be able to return to work\"  [] Progressing: [] Met: [] Not Met: [] Adjusted    Therapist goals for Patient:   Short Term Goals: To be achieved in: 2 weeks  1. Independent in HEP and progression per patient tolerance, in order to prevent re-injury. [] Progressing: [] Met: [] Not Met: [] Adjusted  2. Patient will have a decrease in painby 40% to facilitate improvement in movement, function, and ADLs as indicated by Functional Deficits.   [] Progressing: [] Met: [] Not Met: [] Adjusted    Long Term Goals: To be achieved in: at discharge  1. Disability index score of 30% or less for the UEFS to assist with reaching prior level of function. [] Progressing: [] Met: [] Not Met: [] Adjusted  2. Patient will demonstrate increased AROM to flex/abd = 160 and ER= 75 to allow for proper joint functioning as indicated by Functional Deficits. [] Progressing: [] Met: [] Not Met: [] Adjusted  3. Patient will demonstrate an increase in NM recruitment/activation and overall GH and scapular strength to 4/5 for proper functional mobility as indicated by patients Functional Deficits. [] Progressing: [] Met: [] Not Met: [] Adjusted  4. Patient will return to dressing independently without increased symptoms or restriction. [] Progressing: [] Met: [] Not Met: [] Adjusted  5. Patient will return to driving without increased symptoms or restriction. [] Progressing: [] Met: [] Not Met: [] Adjusted    Electronically signed by:  Derick Benito PT , OMT-C,  [id number]    Note: If patient does not return for scheduled/recommended follow up visits, this note will serve as a discharge from care along with the most recent update on progress.

## 2021-12-17 ENCOUNTER — HOSPITAL ENCOUNTER (OUTPATIENT)
Dept: PHYSICAL THERAPY | Age: 41
Setting detail: THERAPIES SERIES
Discharge: HOME | End: 2021-12-17
Payer: COMMERCIAL

## 2021-12-17 PROCEDURE — 97110 THERAPEUTIC EXERCISES: CPT

## 2021-12-17 PROCEDURE — G0283 ELEC STIM OTHER THAN WOUND: HCPCS

## 2021-12-17 PROCEDURE — 97140 MANUAL THERAPY 1/> REGIONS: CPT

## 2021-12-17 NOTE — FLOWSHEET NOTE
with sling on. Needs cueing to relax UT with arm at side. · Bandages/Dressings/Incisions: stitches in place with no signs of infection and covered with bandaids.  Test measurements:    ROM:  Date      Shldr flexion    Shldr abd  Shldr IR         Shldr ER   A P A P A P A P   Eval 12/15  15  50  40  40                Strength:  Date Shoulder flexion Shoulder abduction Shoulder IR Shoulder  ER Bicep   Eval 12/15 NT NT NT NT NT           NT=not tested due to MD protocol    RESTRICTIONS/PRECAUTIONS: Pt s/p L shoulder scope with SAD, DCE, RTC debridement, and labral repair/debridement on 12/13/21. Call placed 12/15 to MD concerning restrictions, sling use, and PT protocol    Exercises/Interventions:   Therapeutic Ex (84826)  Min: 15 Resistance/Reps Notes/Cues   Left SAD, DCE, RTC/labral debridement  12/13/21   Elbow AROM:   *flexion/extension 0# 10x L    Forearm AROM: *supination/pronation 10x L    Wrist AROM:  *flexion / extension 10x L    Hand gripper all Red 10x         Tableslides:  *flexion  *scaption   10x  10x    Supine cane:  *ER   add         Therapeutic Activity (42764) Min:                     NMR re-education (44851)  Min: 5      Shoulder shrugs 10x    Scap squeeze 10x  in seated position, cues to relax UT        Manual Intervention (07414) Min: 15     Shoulder PROM 4-way x15 mins - gentle              Modalities:  Min 15     IFC with CP x15 mins L shoulder Seated in recliner          Other Therapeutic Activities:  Pt was educated on PT POC, Diagnosis, Prognosis, pathomechanics as well as frequency and duration of scheduling future physical therapy appointments. Time was also taken on this day to answer all patient questions and participation in PT. Reviewed appointment policy in detail with patient and patient verbalized understanding.      Home Exercise Program: Patient instructed in the following for HEP:           Access Code: [email or web address]  Seated Shoulder Shrugs - 2 x daily - 7 x weekly - 2 sets - 10 reps  Seated Scapular Retraction - 2 x daily - 7 x weekly - 1 sets - 10 reps  Bicep curls - 2 x daily - 7 x weekly - 2 sets - 10 reps  Composite Wrist and Finger Flexion and Extension - 2 x daily - 7 x weekly - 2 sets - 10 reps  Seated Forearm Pronation Supination AROM - 2 x daily - 7 x weekly - 2 sets - 10 reps  Towel Roll Squeeze - 2 x daily - 7 x weekly - 2 sets - 10 reps  Seated Shoulder Flexion Towel Slide at Table Top - 2 x daily - 7 x weekly - 1 sets - 10 reps  Patient verbalized/demonstrated understanding and was issued written handout. Therapeutic Exercise and NMR EXR  [x] (71802) Provided verbal/tactile cueing for activities related to strengthening, flexibility, endurance, ROM  for improvements in scapular, scapulothoracic and UE control with self care, reaching, carrying, lifting, house/yardwork, driving/computer work.    [] (44069) Provided verbal/tactile cueing for activities related to improving balance, coordination, kinesthetic sense, posture, motor skill, proprioception  to assist with  scapular, scapulothoracic and UE control with self care, reaching, carrying, lifting, house/yardwork, driving/computer work. Therapeutic Activities:    [] (03385 or 08409) Provided verbal/tactile cueing for activities related to improving balance, coordination, kinesthetic sense, posture, motor skill, proprioception and motor activation to allow for proper function of scapular, scapulothoracic and UE control with self care, carrying, lifting, driving/computer work.      Home Exercise Program:    [x] (19202) Reviewed/Progressed HEP activities related to strengthening, flexibility, endurance, ROM of scapular, scapulothoracic and UE control with self care, reaching, carrying, lifting, house/yardwork, driving/computer work  [] (18689) Reviewed/Progressed HEP activities related to improving balance, coordination, kinesthetic sense, posture, motor skill, proprioception of scapular, scapulothoracic and UE control with self care, reaching, carrying, lifting, house/yardwork, driving/computer work      Manual Treatments:  PROM / STM / Oscillations-Mobs:  G-I, II, III, IV (PA's, Inf., Post.)  [x] (06130) Provided manual therapy to mobilize soft tissue/joints of cervical/CT, scapular GHJ and UE for the purpose of modulating pain, promoting relaxation,  increasing ROM, reducing/eliminating soft tissue swelling/inflammation/restriction, improving soft tissue extensibility and allowing for proper ROM for normal function with self care, reaching, carrying, lifting, house/yardwork, driving/computer work    Charges:  Timed Code Treatment Minutes: 30   Total Treatment Minutes: 55       [x] EVAL (LOW) 86138 (typically 20 minutes face-to-face)  [] EVAL (MOD) 53430 (typically 30 minutes face-to-face)  [] EVAL (HIGH) 42167 (typically 45 minutes face-to-face)  [] RE-EVAL     [x] TN(09939) x     [] Dry needle 1 or 2 Muscles (36707)  [] NMR (53517) x     [] Dry needle 3+ Muscles (86006)  [x] Manual (49120) x     [] Ultrasound (34620) x  [] TA (84589) x     [] Mech Traction (01027)  [] ES(attended) (97529)     [x] ES (un) (60556):   [] Vasopump (92556) [] Ionto (93480)   [] Other:    Approval Dates:  CPT Code Units Approved Units Used  Date Updated:        6   Updated 12/17               GOALS:  Patient stated goal: \"Be able to return to work\"  []? Progressing: []? Met: []? Not Met: []? Adjusted     Therapist goals for Patient:   Short Term Goals: To be achieved in: 2 weeks  1. Independent in HEP and progression per patient tolerance, in order to prevent re-injury. []? Progressing: []? Met: []? Not Met: []? Adjusted  2. Patient will have a decrease in painby 40% to facilitate improvement in movement, function, and ADLs as indicated by Functional Deficits. []? Progressing: []? Met: []? Not Met: []? Adjusted     Long Term Goals: To be achieved in: at discharge  1.  Disability index score of 30% or less for the UEFS to assist with reaching prior level of function. []? Progressing: []? Met: []? Not Met: []? Adjusted  2. Patient will demonstrate increased AROM to flex/abd = 160 and ER= 75 to allow for proper joint functioning as indicated by Functional Deficits. []? Progressing: []? Met: []? Not Met: []? Adjusted  3. Patient will demonstrate an increase in NM recruitment/activation and overall GH and scapular strength to 4/5 for proper functional mobility as indicated by patients Functional Deficits. []? Progressing: []? Met: []? Not Met: []? Adjusted  4. Patient will return to dressing independently without increased symptoms or restriction. []? Progressing: []? Met: []? Not Met: []? Adjusted  5. Patient will return to driving without increased symptoms or restriction. []? Progressing: []? Met: []? Not Met: []? Adjusted    ASSESSMENT:  12/17 Progressing with PROM tolerance. Treatment/Activity Tolerance:  [x] Patient tolerated treatment well [] Patient limited by fatique  [] Patient limited by pain  [] Patient limited by other medical complications  [] Other:     Overall Progression Towards Functional goals/ Treatment Progress Update:  [] Patient is progressing as expected towards functional goals listed. [] Progression is slowed due to complexities/Impairments listed. [] Progression has been slowed due to co-morbidities. [x] Plan just implemented, too soon to assess goals progression <30days   [] Goals require adjustment due to lack of progress  [] Patient is not progressing as expected and requires additional follow up with physician  [] Other    Prognosis for POC: [x] Good [] Fair  [] Poor    Patient requires continued skilled intervention: [x] Yes  [] No      PLAN:Progress PROM/ AAROM protocol.   [x] Continue per plan of care [] Alter current plan (see comments)  [] Plan of care initiated [] Hold pending MD visit [] Discharge    Electronically signed by: Anthony Ortega, PT ,[id number]      Note: If patient does not return for scheduled/recommended follow up visits, this note will serve as a discharge from care along with the most recent update on progress.

## 2021-12-21 ENCOUNTER — HOSPITAL ENCOUNTER (OUTPATIENT)
Dept: PHYSICAL THERAPY | Age: 41
Setting detail: THERAPIES SERIES
Discharge: HOME | End: 2021-12-21
Payer: COMMERCIAL

## 2021-12-21 PROCEDURE — 97110 THERAPEUTIC EXERCISES: CPT

## 2021-12-21 PROCEDURE — G0283 ELEC STIM OTHER THAN WOUND: HCPCS

## 2021-12-21 PROCEDURE — 97140 MANUAL THERAPY 1/> REGIONS: CPT

## 2021-12-21 PROCEDURE — 97112 NEUROMUSCULAR REEDUCATION: CPT

## 2021-12-21 NOTE — FLOWSHEET NOTE
[facility], [facility]  [address] [facility] [place], [facility]  Phone: [phone]   Fax:     [phone]        Physical Therapy Treatment Note/ Progress Report:       Date:  2021    Patient Name:  Tino Dumas    :  1980  MRN: [id number]    Pertinent Medical History:asthma, depression    Medical/Treatment Diagnosis Information:  · Diagnosis: S/P R shoulder scope with SAD,DCE, RTC debridement, and labral repair  · Treatment Diagnosis: Decreased mobility, strength    Insurance/Certification information:  PT Insurance Information: [facility]  Physician Information:  Referring Practitioner: Krysta Booth MD  Plan of care signed (Y/N): faxed 12/15    Date of Patient follow up with Physician:      Progress Report: []  Yes  [x]  No     Date Range for reporting period:  Beginnin/15/2021  Ending:      Progress report due (10 Rx/or 30 days whichever is less):     Recertification due (POC duration/ or 90 days whichever is less): 21    Visit # POC/Insurance Allowable Auth Needed    eval + 96 units 12/15-22 [x]Yes    []No     Functional Outcomes Measure:    Date Assessed: at eval  Test: UEFS  Score: 23/80 = 71% disability    Pain level:  2/10     History of Injury: Patient underwent L shoulder scope with SAD, DCE, RTC debridement, and labral repair/debridement on 21. Patient notes no complications with surgery. Currently wearing sling at all times when she is up and moving. Patient notes taking her sling off when she is relaxing at home. Currently getting assistance from family with ADLs like dressing and cooking. Patient has also been icing her shoulder as needed. SUBJECTIVE:  :  Shoulder is feeling better. Was sore after last session but calmed down the next day.      OBJECTIVE:    Observation:   · Palpation: mod TTP AC joint, bicep tendon, UT  · Functional Mobility/Transfers: Pain in shoulder with going from sit to supine on table. · Posture: Pt to PT with sling on. Needs cueing to relax UT with arm at side. · Bandages/Dressings/Incisions: stitches in place with no signs of infection and covered with bandaids.  Test measurements:    ROM:  Date      Shldr flexion    Shldr abd  Shldr IR         Shldr ER   A P A P A P A P   Eval 12/15  15  50  40  40                Strength:  Date Shoulder flexion Shoulder abduction Shoulder IR Shoulder  ER Bicep   Eval 12/15 NT NT NT NT NT           NT=not tested due to MD protocol    RESTRICTIONS/PRECAUTIONS: Pt s/p L shoulder scope with SAD, DCE, RTC debridement, and labral repair/debridement on 12/13/21. Call placed 12/15 to MD concerning restrictions, sling use, and PT protocol    Exercises/Interventions:   Therapeutic Ex (78068)  Min: 20 Resistance/Reps Notes/Cues   Left SAD, DCE, RTC/labral debridement  12/13/21   Elbow AROM:   *flexion/extension 0# 15x L    Forearm AROM: *supination/pronation 10x L    Wrist AROM:  *flexion / extension 10x L    Hand gripper all Red 10x         Swiss ball on table:  *flexion  *scaption   10x - PROM  10x - PROM    Tableslides:  *flexion  *scaption   10x  10x    Supine cane:  *ER   10x L         Therapeutic Activity (30407) Min:                     NMR re-education (98495)  Min: 8     Shoulder shrugs 10x    Scap squeeze 10x  in seated position, cues to relax UT   SL scap:  *retraction/protraction  *depression   10x - AAROM  10x - AAROM         Manual Intervention (41384) Min: 15     Shoulder PROM 4-way x15 mins - gentle              Modalities:  Min 15     IFC with CP x15 mins L shoulder Seated in recliner          Other Therapeutic Activities:  Pt was educated on PT POC, Diagnosis, Prognosis, pathomechanics as well as frequency and duration of scheduling future physical therapy appointments. Time was also taken on this day to answer all patient questions and participation in PT.  Reviewed appointment policy in detail with patient and patient verbalized understanding. Home Exercise Program: Patient instructed in the following for HEP:           Access Code: [email or web address]  Seated Shoulder Shrugs - 2 x daily - 7 x weekly - 2 sets - 10 reps  Seated Scapular Retraction - 2 x daily - 7 x weekly - 1 sets - 10 reps  Bicep curls - 2 x daily - 7 x weekly - 2 sets - 10 reps  Composite Wrist and Finger Flexion and Extension - 2 x daily - 7 x weekly - 2 sets - 10 reps  Seated Forearm Pronation Supination AROM - 2 x daily - 7 x weekly - 2 sets - 10 reps  Towel Roll Squeeze - 2 x daily - 7 x weekly - 2 sets - 10 reps  Seated Shoulder Flexion Towel Slide at Table Top - 2 x daily - 7 x weekly - 1 sets - 10 reps  Patient verbalized/demonstrated understanding and was issued written handout. Therapeutic Exercise and NMR EXR  [x] (56664) Provided verbal/tactile cueing for activities related to strengthening, flexibility, endurance, ROM  for improvements in scapular, scapulothoracic and UE control with self care, reaching, carrying, lifting, house/yardwork, driving/computer work.    [] (30322) Provided verbal/tactile cueing for activities related to improving balance, coordination, kinesthetic sense, posture, motor skill, proprioception  to assist with  scapular, scapulothoracic and UE control with self care, reaching, carrying, lifting, house/yardwork, driving/computer work. Therapeutic Activities:    [] (45203 or 55978) Provided verbal/tactile cueing for activities related to improving balance, coordination, kinesthetic sense, posture, motor skill, proprioception and motor activation to allow for proper function of scapular, scapulothoracic and UE control with self care, carrying, lifting, driving/computer work.      Home Exercise Program:    [x] (66317) Reviewed/Progressed HEP activities related to strengthening, flexibility, endurance, ROM of scapular, scapulothoracic and UE control with self care, reaching, carrying, lifting, house/yardwork, driving/computer work  [] (83516) Reviewed/Progressed HEP activities related to improving balance, coordination, kinesthetic sense, posture, motor skill, proprioception of scapular, scapulothoracic and UE control with self care, reaching, carrying, lifting, house/yardwork, driving/computer work      Manual Treatments:  PROM / STM / Oscillations-Mobs:  G-I, II, III, IV (PA's, Inf., Post.)  [x] (72402) Provided manual therapy to mobilize soft tissue/joints of cervical/CT, scapular GHJ and UE for the purpose of modulating pain, promoting relaxation,  increasing ROM, reducing/eliminating soft tissue swelling/inflammation/restriction, improving soft tissue extensibility and allowing for proper ROM for normal function with self care, reaching, carrying, lifting, house/yardwork, driving/computer work    Charges:  Timed Code Treatment Minutes: 43   Total Treatment Minutes: 58       [] EVAL (LOW) 03534 (typically 20 minutes face-to-face)  [] EVAL (MOD) 01215 (typically 30 minutes face-to-face)  [] EVAL (HIGH) 13123 (typically 45 minutes face-to-face)  [] RE-EVAL     [x] FL(16379) x     [] Dry needle 1 or 2 Muscles (97561)  [x] NMR (31910) x     [] Dry needle 3+ Muscles (97197)  [x] Manual ([id number]) x     [] Ultrasound (20811) x  [] TA (41728) x     [] Mech Traction (35060)  [] ES(attended) (60983)     [x] ES (un) (58193):   [] Vasopump (04264) [] Ionto (79945)   [] Other:    Approval Dates: 12/15/21 to 2/28/22  CPT Code Units Approved Units Used  Date Updated: 12/21   Eval  1    Ex 96 3   Man 96 3   NMR 96 1   TA 96    Estim 96 3     GOALS:  Patient stated goal: \"Be able to return to work\"  []? Progressing: []? Met: []? Not Met: []? Adjusted     Therapist goals for Patient:   Short Term Goals: To be achieved in: 2 weeks  1. Independent in HEP and progression per patient tolerance, in order to prevent re-injury. []? Progressing: []? Met: []? Not Met: []? Adjusted  2.  Patient will have a decrease in painby 40% to facilitate improvement in movement, function, and ADLs as indicated by Functional Deficits. []? Progressing: []? Met: []? Not Met: []? Adjusted     Long Term Goals: To be achieved in: at discharge  1. Disability index score of 30% or less for the UEFS to assist with reaching prior level of function. []? Progressing: []? Met: []? Not Met: []? Adjusted  2. Patient will demonstrate increased AROM to flex/abd = 160 and ER= 75 to allow for proper joint functioning as indicated by Functional Deficits. []? Progressing: []? Met: []? Not Met: []? Adjusted  3. Patient will demonstrate an increase in NM recruitment/activation and overall GH and scapular strength to 4/5 for proper functional mobility as indicated by patients Functional Deficits. []? Progressing: []? Met: []? Not Met: []? Adjusted  4. Patient will return to dressing independently without increased symptoms or restriction. []? Progressing: []? Met: []? Not Met: []? Adjusted  5. Patient will return to driving without increased symptoms or restriction. []? Progressing: []? Met: []? Not Met: []? Adjusted    ASSESSMENT:  Patient with good tolerance to PT session today. Patient demonstrates improved tolerance and increased range with PROM. Fatigues quickly with scap stabilization exercises and requires cues to turn off UT. Patient will benefit from continued skilled care to progress rehab per MD protocol. Treatment/Activity Tolerance:  [x] Patient tolerated treatment well [] Patient limited by fatique  [] Patient limited by pain  [] Patient limited by other medical complications  [] Other:     Overall Progression Towards Functional goals/ Treatment Progress Update:  [] Patient is progressing as expected towards functional goals listed. [] Progression is slowed due to complexities/Impairments listed. [] Progression has been slowed due to co-morbidities.   [x] Plan just implemented, too soon to assess goals progression

## 2021-12-23 ENCOUNTER — HOSPITAL ENCOUNTER (OUTPATIENT)
Dept: PHYSICAL THERAPY | Age: 41
Setting detail: THERAPIES SERIES
Discharge: HOME | End: 2021-12-23
Payer: COMMERCIAL

## 2021-12-23 PROCEDURE — 97140 MANUAL THERAPY 1/> REGIONS: CPT

## 2021-12-23 PROCEDURE — G0283 ELEC STIM OTHER THAN WOUND: HCPCS

## 2021-12-23 PROCEDURE — 97110 THERAPEUTIC EXERCISES: CPT

## 2021-12-23 PROCEDURE — 97112 NEUROMUSCULAR REEDUCATION: CPT

## 2021-12-23 NOTE — FLOWSHEET NOTE
[facility], [facility]  [address] [facility] [place], [facility]  Phone: [phone]   Fax:     [phone]        Physical Therapy Treatment Note/ Progress Report:       Date:  2021    Patient Name:  Meek Drummond    :  1980  MRN: [id number]    Pertinent Medical History:asthma, depression    Medical/Treatment Diagnosis Information:  · Diagnosis: S/P R shoulder scope with SAD,DCE, RTC debridement, and labral repair  · Treatment Diagnosis: Decreased mobility, strength    Insurance/Certification information:  PT Insurance Information: [facility]  Physician Information:  Referring Practitioner: Moe Miller MD  Plan of care signed (Y/N): faxed 12/15    Date of Patient follow up with Physician:      Progress Report: []  Yes  [x]  No     Date Range for reporting period:  Beginnin/15/2021  Ending:      Progress report due (10 Rx/or 30 days whichever is less):     Recertification due (POC duration/ or 90 days whichever is less): 21    Visit # POC/Insurance Allowable Auth Needed   3/12 eval + 96 units 12/15-22 [x]Yes    []No     Functional Outcomes Measure:    Date Assessed: at eval  Test: UEFS  Score: 23/80 = 71% disability    Pain level:  0/10     History of Injury: Patient underwent L shoulder scope with SAD, DCE, RTC debridement, and labral repair/debridement on 21. Patient notes no complications with surgery. Currently wearing sling at all times when she is up and moving. Patient notes taking her sling off when she is relaxing at home. Currently getting assistance from family with ADLs like dressing and cooking. Patient has also been icing her shoulder as needed. SUBJECTIVE:  :  Shoulder is feeling better. Was sore after last session but calmed down the next day. : shoulder is doing well. No c/o pain currently. To PT without sling today.      OBJECTIVE:  Observation:   · Palpation: mod TTP AC joint, bicep tendon, UT  · Functional Mobility/Transfers: Pain in shoulder with going from sit to supine on table. · Posture: Pt to PT with sling on. Needs cueing to relax UT with arm at side. · Bandages/Dressings/Incisions: stitches in place with no signs of infection and covered with bandaids.  Test measurements:    ROM:  Date      Shldr flexion    Shldr abd  Shldr IR         Shldr ER   A P A P A P A P   Eval 12/15  15  50  40  40                Strength:  Date Shoulder flexion Shoulder abduction Shoulder IR Shoulder  ER Bicep   Eval 12/15 NT NT NT NT NT           NT=not tested due to MD protocol    RESTRICTIONS/PRECAUTIONS: Pt s/p L shoulder scope with SAD, DCE, RTC debridement, and labral repair/debridement on 12/13/21. Exercises/Interventions:   Therapeutic Ex (62281)  Min: 20 Resistance/Reps Notes/Cues   Left SAD, DCE, RTC/labral debridement  12/13/21   Elbow AROM:   *flexion/extension 0# 20x L    Forearm AROM: *supination/pronation 2# 20x L    Wrist AROM:  *flexion / extension 2# 20x L    Hand gripper all Red 10x         Pulley flexion Add     Swiss ball on table:  *flexion  *scaption   10x - AAROM  10x - AAROM    Tableslides:  *flexion  *scaption   10x  10x    Standing cane ER 10x with elbow by side         Supine cane:  *ER   10x L         Therapeutic Activity (71841) Min:      Suture removal from incision portals No signs of infection. Areas covered with bandaids.               NMR re-education (44232)  Min: 8     Shoulder shrugs 10x    Scap squeeze 20x  in seated position, cues to relax UT   SL scap:  *retraction/protraction  *depression   10x AROM  10x AROM    Shldr isometrics sub-max all directions Add          Manual Intervention (13814) Min: 15     Shoulder PROM 4-way x15 mins - gentle              Modalities:  Min 15     IFC with CP x15 mins L shoulder Seated in recliner          Other Therapeutic Activities:  Pt was educated on PT POC, Diagnosis, Prognosis, pathomechanics as well as frequency and duration of scheduling future physical therapy appointments. Time was also taken on this day to answer all patient questions and participation in PT. Reviewed appointment policy in detail with patient and patient verbalized understanding. Home Exercise Program: Patient instructed in the following for HEP:           Access Code: [email or web address]  Seated Shoulder Shrugs - 2 x daily - 7 x weekly - 2 sets - 10 reps  Seated Scapular Retraction - 2 x daily - 7 x weekly - 1 sets - 10 reps  Bicep curls - 2 x daily - 7 x weekly - 2 sets - 10 reps  Composite Wrist and Finger Flexion and Extension - 2 x daily - 7 x weekly - 2 sets - 10 reps  Seated Forearm Pronation Supination AROM - 2 x daily - 7 x weekly - 2 sets - 10 reps  Towel Roll Squeeze - 2 x daily - 7 x weekly - 2 sets - 10 reps  Seated Shoulder Flexion Towel Slide at Table Top - 2 x daily - 7 x weekly - 1 sets - 10 reps  Patient verbalized/demonstrated understanding and was issued written handout. Therapeutic Exercise and NMR EXR  [x] (66573) Provided verbal/tactile cueing for activities related to strengthening, flexibility, endurance, ROM  for improvements in scapular, scapulothoracic and UE control with self care, reaching, carrying, lifting, house/yardwork, driving/computer work.    [] (99587) Provided verbal/tactile cueing for activities related to improving balance, coordination, kinesthetic sense, posture, motor skill, proprioception  to assist with  scapular, scapulothoracic and UE control with self care, reaching, carrying, lifting, house/yardwork, driving/computer work.     Therapeutic Activities:    [] (73190 or 99206) Provided verbal/tactile cueing for activities related to improving balance, coordination, kinesthetic sense, posture, motor skill, proprioception and motor activation to allow for proper function of scapular, scapulothoracic and UE control with self care, carrying, lifting, driving/computer work.     Home Exercise Program:    [x] (97047) Reviewed/Progressed HEP activities related to strengthening, flexibility, endurance, ROM of scapular, scapulothoracic and UE control with self care, reaching, carrying, lifting, house/yardwork, driving/computer work  [] (88152) Reviewed/Progressed HEP activities related to improving balance, coordination, kinesthetic sense, posture, motor skill, proprioception of scapular, scapulothoracic and UE control with self care, reaching, carrying, lifting, house/yardwork, driving/computer work      Manual Treatments:  PROM / STM / Oscillations-Mobs:  G-I, II, III, IV (PA's, Inf., Post.)  [x] (70161) Provided manual therapy to mobilize soft tissue/joints of cervical/CT, scapular GHJ and UE for the purpose of modulating pain, promoting relaxation,  increasing ROM, reducing/eliminating soft tissue swelling/inflammation/restriction, improving soft tissue extensibility and allowing for proper ROM for normal function with self care, reaching, carrying, lifting, house/yardwork, driving/computer work    Charges:  Timed Code Treatment Minutes: 43   Total Treatment Minutes: 58       [] EVAL (LOW) 40876 (typically 20 minutes face-to-face)  [] EVAL (MOD) 43140 (typically 30 minutes face-to-face)  [] EVAL (HIGH) 26530 (typically 45 minutes face-to-face)  [] RE-EVAL     [x] HO(39594) x     [] Dry needle 1 or 2 Muscles (19339)  [x] NMR (47510) x     [] Dry needle 3+ Muscles (56111)  [x] Manual (79129) x     [] Ultrasound (57615) x  [] TA (60862) x     [] Mech Traction (29485)  [] ES(attended) (90739)     [x] ES (un) (98743):   [] Vasopump (05447) [] Ionto (17042)   [] Other:    Approval Dates: 12/15/21 to 2/28/22  CPT Code Units Approved Units Used  Date Updated: 12/23   Eval  1    Ex 96 4   Man 96 4   NMR 96 2   TA 96    Estim 96 3     GOALS:  Patient stated goal: \"Be able to return to work\"  []? Progressing: []? Met: []? Not Met: []?  Adjusted     Therapist goals for Patient:   Short Term Goals: To be achieved in: 2 weeks  1. Independent in HEP and progression per patient tolerance, in order to prevent re-injury. []? Progressing: []? Met: []? Not Met: []? Adjusted  2. Patient will have a decrease in painby 40% to facilitate improvement in movement, function, and ADLs as indicated by Functional Deficits. []? Progressing: []? Met: []? Not Met: []? Adjusted     Long Term Goals: To be achieved in: at discharge  1. Disability index score of 30% or less for the UEFS to assist with reaching prior level of function. []? Progressing: []? Met: []? Not Met: []? Adjusted  2. Patient will demonstrate increased AROM to flex/abd = 160 and ER= 75 to allow for proper joint functioning as indicated by Functional Deficits. []? Progressing: []? Met: []? Not Met: []? Adjusted  3. Patient will demonstrate an increase in NM recruitment/activation and overall GH and scapular strength to 4/5 for proper functional mobility as indicated by patients Functional Deficits. []? Progressing: []? Met: []? Not Met: []? Adjusted  4. Patient will return to dressing independently without increased symptoms or restriction. []? Progressing: []? Met: []? Not Met: []? Adjusted  5. Patient will return to driving without increased symptoms or restriction. []? Progressing: []? Met: []? Not Met: []? Adjusted    ASSESSMENT:  Patient with good tolerance to PT session today. Patient demonstrates improved tolerance and increased range with PROM. Fatigues quickly with scap stabilization exercises and requires cues to turn off UT. Patient will benefit from continued skilled care to progress rehab per MD protocol.      Treatment/Activity Tolerance:  [x] Patient tolerated treatment well [] Patient limited by fatique  [] Patient limited by pain  [] Patient limited by other medical complications  [] Other:     Overall Progression Towards Functional goals/ Treatment Progress Update:  [] Patient is progressing as expected towards functional goals listed. [] Progression is slowed due to complexities/Impairments listed. [] Progression has been slowed due to co-morbidities. [x] Plan just implemented, too soon to assess goals progression <30days   [] Goals require adjustment due to lack of progress  [] Patient is not progressing as expected and requires additional follow up with physician  [] Other    Prognosis for POC: [x] Good [] Fair  [] Poor    Patient requires continued skilled intervention: [x] Yes  [] No      PLAN:Progress AAROM/AROM as able. [x] Continue per plan of care [] Alter current plan (see comments)  [] Plan of care initiated [] Hold pending MD visit [] Discharge    Electronically signed by: Deepak Manzo PT , OMT-C,  [id number]      Note: If patient does not return for scheduled/recommended follow up visits, this note will serve as a discharge from care along with the most recent update on progress.

## 2021-12-27 ENCOUNTER — HOSPITAL ENCOUNTER (OUTPATIENT)
Dept: PHYSICAL THERAPY | Age: 41
Setting detail: THERAPIES SERIES
Discharge: HOME | End: 2021-12-27
Payer: COMMERCIAL

## 2021-12-27 NOTE — FLOWSHEET NOTE
Physical Therapy  Cancellation/No-show Note  Patient Name:  Darwin Jameson  :  1980   Date:  2021  Cancelled visits to date: 1  No-shows to date: 0    For today's appointment patient:  [x]  Cancelled  []  Rescheduled appointment  []  No-show     Reason given by patient:  []  Patient ill  []  Conflicting appointment  []  No transportation    []  Conflict with work  [x]  No reason given  []  Other:     Comments:      Electronically signed by:  Praneeth Gallardo, PTA  [id number]

## 2022-01-03 ENCOUNTER — HOSPITAL ENCOUNTER (OUTPATIENT)
Dept: PHYSICAL THERAPY | Age: 42
Setting detail: THERAPIES SERIES
Discharge: HOME | End: 2022-01-03
Payer: COMMERCIAL

## 2022-01-03 PROCEDURE — G0283 ELEC STIM OTHER THAN WOUND: HCPCS

## 2022-01-03 PROCEDURE — 97140 MANUAL THERAPY 1/> REGIONS: CPT

## 2022-01-03 PROCEDURE — 97110 THERAPEUTIC EXERCISES: CPT

## 2022-01-03 PROCEDURE — 97112 NEUROMUSCULAR REEDUCATION: CPT

## 2022-01-03 NOTE — FLOWSHEET NOTE
[facility], [facility]  [address] [facility], [facility]  Phone: [phone]   Fax:     [phone]        Physical Therapy Treatment Note/ Progress Report:       Date:  1/3/2022    Patient Name:  Tiana Mercer    :  1980  MRN: [id number]    Pertinent Medical History:asthma, depression    Medical/Treatment Diagnosis Information:  · Diagnosis: S/P R shoulder scope with SAD,DCE, RTC debridement, and labral repair  · Treatment Diagnosis: Decreased mobility, strength    Insurance/Certification information:  PT Insurance Information: [facility]  Physician Information:  Referring Practitioner: Rena Mares MD  Plan of care signed (Y/N): faxed 12/15    Date of Patient follow up with Physician:      Progress Report: []  Yes  [x]  No     Date Range for reporting period:  Beginnin/15/2021  Ending:      Progress report due (10 Rx/or 30 days whichever is less):     Recertification due (POC duration/ or 90 days whichever is less): 21    Visit # POC/Insurance Allowable Auth Needed    eval + 96 units 12/15-22 [x]Yes    []No     Functional Outcomes Measure:    Date Assessed: at eval  Test: UEFS  Score: 23/80 = 71% disability    Pain level:  2/10     History of Injury: Patient underwent L shoulder scope with SAD, DCE, RTC debridement, and labral repair/debridement on 21. Patient notes no complications with surgery. Currently wearing sling at all times when she is up and moving. Patient notes taking her sling off when she is relaxing at home. Currently getting assistance from family with ADLs like dressing and cooking. Patient has also been icing her shoulder as needed. SUBJECTIVE:  :  Shoulder is feeling better. Was sore after last session but calmed down the next day. : shoulder is doing well. No c/o pain currently. To PT without sling today.    1/3: shoulder is doing ok. Notes have some rubbing at [facility] joint. OBJECTIVE:    Observation:   · Palpation: mod TTP AC joint, bicep tendon, UT  · Functional Mobility/Transfers: Pain in shoulder with going from sit to supine on table. · Posture: Pt to PT with sling on. Needs cueing to relax UT with arm at side. · Bandages/Dressings/Incisions: stitches in place with no signs of infection and covered with bandaids.  Test measurements:    ROM:  Date      Shldr flexion    Shldr abd  Shldr IR         Shldr ER   A P A P A P A P   Eval 12/15  15  50  40  40                Strength:  Date Shoulder flexion Shoulder abduction Shoulder IR Shoulder  ER Bicep   Eval 12/15 NT NT NT NT NT           NT=not tested due to MD protocol    RESTRICTIONS/PRECAUTIONS: Pt s/p L shoulder scope with SAD, DCE, RTC debridement, and labral repair/debridement on 12/13/21.      Exercises/Interventions:   Therapeutic Ex (17496)  Min: 20 Resistance/Reps Notes/Cues   Left SAD, DCE, RTC/labral debridement  12/13/21   Hand gripper all Red 10x      No restrictions per MD, just pain guided progression   Pulley flexion 10x    Swiss ball on table:  *flexion  *scaption   10x - AROM  10x - AROM    Tableslides:  *flexion  *scaption   10x  10x    Standing cane: ER  extension 10x with elbow by side  10x     Bicep curl 1# 2x10 L    Tband row             extension Add  add         Supine cane:  *ER  *chest press   10x L  add         Therapeutic Activity (40012) Min:                     NMR re-education (49599)  Min: 8     Shoulder shrugs 10x    Scap squeeze 20x  in seated position, cues to relax UT   SL scap:  *retraction/protraction  *depression   10x AROM  10x AROM    Shldr isometrics sub-max all directions in neutral 5x 5 sec holds          Manual Intervention (66747) Min: 15     Shoulder PROM 4-way x15 mins - gentle              Modalities:  Min 15     IFC with CP x15 mins L shoulder Seated in recliner          Other Therapeutic Activities:  Pt was educated on PT POC, Diagnosis, Prognosis, pathomechanics as well as frequency and duration of scheduling future physical therapy appointments. Time was also taken on this day to answer all patient questions and participation in PT. Reviewed appointment policy in detail with patient and patient verbalized understanding. Home Exercise Program: Patient instructed in the following for HEP:           Access Code: [email or web address]  Seated Shoulder Shrugs - 2 x daily - 7 x weekly - 2 sets - 10 reps  Seated Scapular Retraction - 2 x daily - 7 x weekly - 1 sets - 10 reps  Bicep curls - 2 x daily - 7 x weekly - 2 sets - 10 reps  Composite Wrist and Finger Flexion and Extension - 2 x daily - 7 x weekly - 2 sets - 10 reps  Seated Forearm Pronation Supination AROM - 2 x daily - 7 x weekly - 2 sets - 10 reps  Towel Roll Squeeze - 2 x daily - 7 x weekly - 2 sets - 10 reps  Seated Shoulder Flexion Towel Slide at Table Top - 2 x daily - 7 x weekly - 1 sets - 10 reps  Patient verbalized/demonstrated understanding and was issued written handout. Therapeutic Exercise and NMR EXR  [x] (06847) Provided verbal/tactile cueing for activities related to strengthening, flexibility, endurance, ROM  for improvements in scapular, scapulothoracic and UE control with self care, reaching, carrying, lifting, house/yardwork, driving/computer work.    [] (99625) Provided verbal/tactile cueing for activities related to improving balance, coordination, kinesthetic sense, posture, motor skill, proprioception  to assist with  scapular, scapulothoracic and UE control with self care, reaching, carrying, lifting, house/yardwork, driving/computer work.     Therapeutic Activities:    [] (34074 or 60839) Provided verbal/tactile cueing for activities related to improving balance, coordination, kinesthetic sense, posture, motor skill, proprioception and motor activation to allow for proper function of scapular, scapulothoracic and UE control with self care, carrying, lifting, driving/computer work. Home Exercise Program:    [x] (81486) Reviewed/Progressed HEP activities related to strengthening, flexibility, endurance, ROM of scapular, scapulothoracic and UE control with self care, reaching, carrying, lifting, house/yardwork, driving/computer work  [] (12214) Reviewed/Progressed HEP activities related to improving balance, coordination, kinesthetic sense, posture, motor skill, proprioception of scapular, scapulothoracic and UE control with self care, reaching, carrying, lifting, house/yardwork, driving/computer work      Manual Treatments:  PROM / STM / Oscillations-Mobs:  G-I, II, III, IV (PA's, Inf., Post.)  [x] (96000) Provided manual therapy to mobilize soft tissue/joints of cervical/CT, scapular GHJ and UE for the purpose of modulating pain, promoting relaxation,  increasing ROM, reducing/eliminating soft tissue swelling/inflammation/restriction, improving soft tissue extensibility and allowing for proper ROM for normal function with self care, reaching, carrying, lifting, house/yardwork, driving/computer work    Charges:  Timed Code Treatment Minutes: 43   Total Treatment Minutes: 58       [] EVAL (LOW) 82059 (typically 20 minutes face-to-face)  [] EVAL (MOD) 49393 (typically 30 minutes face-to-face)  [] EVAL (HIGH) 11582 (typically 45 minutes face-to-face)  [] RE-EVAL     [x] XA(04790) x     [] Dry needle 1 or 2 Muscles (33222)  [x] NMR (82481) x     [] Dry needle 3+ Muscles (96631)  [x] Manual (72006) x     [] Ultrasound (29932) x  [] TA (53579) x     [] [facility] (19790)  [] ES(attended) (29796)     [x] ES (un) (49465):   [] Vasopump (72628) [] Ionto (16965)   [] Other:    Approval Dates: 12/15/21 to 2/28/22  CPT Code Units Approved Units Used  Date Updated: 1/3   Eval  1    Ex 96 5   Man 96 5   NMR 96 3   TA 96    Estim 96 4     GOALS:  Patient stated goal: \"Be able to return to work\"  []? Progressing: []? Met: []? Not Met: []?  Adjusted     Therapist goals for Patient: Short Term Goals: To be achieved in: 2 weeks  1. Independent in HEP and progression per patient tolerance, in order to prevent re-injury. []? Progressing: []? Met: []? Not Met: []? Adjusted  2. Patient will have a decrease in painby 40% to facilitate improvement in movement, function, and ADLs as indicated by Functional Deficits. []? Progressing: []? Met: []? Not Met: []? Adjusted     Long Term Goals: To be achieved in: at discharge  1. Disability index score of 30% or less for the UEFS to assist with reaching prior level of function. []? Progressing: []? Met: []? Not Met: []? Adjusted  2. Patient will demonstrate increased AROM to flex/abd = 160 and ER= 75 to allow for proper joint functioning as indicated by Functional Deficits. []? Progressing: []? Met: []? Not Met: []? Adjusted  3. Patient will demonstrate an increase in NM recruitment/activation and overall GH and scapular strength to 4/5 for proper functional mobility as indicated by patients Functional Deficits. []? Progressing: []? Met: []? Not Met: []? Adjusted  4. Patient will return to dressing independently without increased symptoms or restriction. []? Progressing: []? Met: []? Not Met: []? Adjusted  5. Patient will return to driving without increased symptoms or restriction. []? Progressing: []? Met: []? Not Met: []? Adjusted    ASSESSMENT:  Patient with good tolerance to PT session today. Patient demonstrates improved tolerance and increased range with PROM. Fatigues quickly with scap stabilization exercises and requires cues to turn off UT. Patient will benefit from continued skilled care to progress rehab per MD protocol.      Treatment/Activity Tolerance:  [x] Patient tolerated treatment well [] Patient limited by fatique  [] Patient limited by pain  [] Patient limited by other medical complications  [] Other:     Overall Progression Towards Functional goals/ Treatment Progress Update:  [] Patient is progressing as expected towards functional goals listed. [] Progression is slowed due to complexities/Impairments listed. [] Progression has been slowed due to co-morbidities. [x] Plan just implemented, too soon to assess goals progression <30days   [] Goals require adjustment due to lack of progress  [] Patient is not progressing as expected and requires additional follow up with physician  [] Other    Prognosis for POC: [x] Good [] Fair  [] Poor    Patient requires continued skilled intervention: [x] Yes  [] No      PLAN:Progress AAROM/AROM as able. Focus on scap stabilizers to decrease AC joint irritation. [x] Continue per plan of care [] Alter current plan (see comments)  [] Plan of care initiated [] Hold pending MD visit [] Discharge    Electronically signed by: Mosetta Bosworth, PT , OMT-C,  [id number]      Note: If patient does not return for scheduled/recommended follow up visits, this note will serve as a discharge from care along with the most recent update on progress.

## 2022-01-06 ENCOUNTER — HOSPITAL ENCOUNTER (OUTPATIENT)
Dept: PHYSICAL THERAPY | Age: 42
Setting detail: THERAPIES SERIES
Discharge: HOME | End: 2022-01-06
Payer: COMMERCIAL

## 2022-01-06 PROCEDURE — 97140 MANUAL THERAPY 1/> REGIONS: CPT

## 2022-01-06 PROCEDURE — G0283 ELEC STIM OTHER THAN WOUND: HCPCS

## 2022-01-06 PROCEDURE — 97112 NEUROMUSCULAR REEDUCATION: CPT

## 2022-01-06 PROCEDURE — 97110 THERAPEUTIC EXERCISES: CPT

## 2022-01-06 NOTE — FLOWSHEET NOTE
[facility], [facility]  [address] [facility], [facility]  Phone: [phone]   Fax:     [phone]        Physical Therapy Treatment Note/ Progress Report:       Date:  2022    Patient Name:  Maria Isabel Jj    :  1980  MRN: [id number]    Pertinent Medical History:asthma, depression    Medical/Treatment Diagnosis Information:  · Diagnosis: S/P R shoulder scope with SAD,DCE, RTC debridement, and labral repair  · Treatment Diagnosis: Decreased mobility, strength    Insurance/Certification information:  PT Insurance Information: [facility]  Physician Information:  Referring Practitioner: Roxana Coyle MD  Plan of care signed (Y/N): faxed 12/15    Date of Patient follow up with Physician:      Progress Report: []  Yes  [x]  No     Date Range for reporting period:  Beginnin/15/2021  Ending:      Progress report due (10 Rx/or 30 days whichever is less): 83    Recertification due (POC duration/ or 90 days whichever is less): 21    Visit # POC/Insurance Allowable Auth Needed    eval + 96 units 12/15-22 [x]Yes    []No     Functional Outcomes Measure:    Date Assessed: at eval  Test: UEFS  Score: 23/80 = 71% disability    Pain level:  1/10     History of Injury: Patient underwent L shoulder scope with SAD, DCE, RTC debridement, and labral repair/debridement on 21. Patient notes no complications with surgery. Currently wearing sling at all times when she is up and moving. Patient notes taking her sling off when she is relaxing at home. Currently getting assistance from family with ADLs like dressing and cooking. Patient has also been icing her shoulder as needed. SUBJECTIVE:  :  Shoulder is feeling better. Was sore after last session but calmed down the next day. : shoulder is doing well. No c/o pain currently. To PT without sling today.    1/3: shoulder is doing ok. Notes have some rubbing at [facility] joint. 1/6: had to take ibuprofen after last session but shoulder was not bad. OBJECTIVE:    Observation:   · Palpation: mod TTP AC joint, bicep tendon, UT  · Functional Mobility/Transfers: Pain in shoulder with going from sit to supine on table. · Posture: Pt to PT with sling on. Needs cueing to relax UT with arm at side. · Bandages/Dressings/Incisions: stitches in place with no signs of infection and covered with bandaids.  Test measurements:    ROM:  Date      Shldr flexion    Shldr abd  Shldr IR         Shldr ER   A P A P A P A P   Eval 12/15  15  50  40  40                Strength:  Date Shoulder flexion Shoulder abduction Shoulder IR Shoulder  ER Bicep   Eval 12/15 NT NT NT NT NT           NT=not tested due to MD protocol    RESTRICTIONS/PRECAUTIONS: Pt s/p L shoulder scope with SAD, DCE, RTC debridement, and labral repair/debridement on 12/13/21.      Exercises/Interventions:   Therapeutic Ex (85661)  Min: 20 Resistance/Reps Notes/Cues   Left SAD, DCE, RTC/labral debridement  12/13/21   Hand gripper all Red 10x      No restrictions per MD, just pain guided progression   Pulley flexion 10x    Swiss ball on table:  *flexion  *scaption   10x - AROM  10x - AROM    Tableslides:  *flexion  *scaption   10x  10x    Standing cane: ER  extension 10x with elbow by side  10x     Bicep curl 1# 2x10 L    Tband row             Extension             triceps Red 10x  Red 10x  Add          Supine cane:  *ER  *chest press   10x L  5x with min A    SL flexion with UE ranger Add          Therapeutic Activity (56578) Min:                     NMR re-education (22274)  Min: 8     SL scap:  *retraction/protraction  *depression   10x AROM  10x AROM    Shldr isometrics sub-max all directions in neutral 5x 5 sec holds          Manual Intervention (60666) Min: 12     Shoulder PROM 4-way x12 mins - gentle              Modalities:  Min 15     IFC with CP x15 mins L shoulder Seated scapular, scapulothoracic and UE control with self care, carrying, lifting, driving/computer work. Home Exercise Program:    [x] (95860) Reviewed/Progressed HEP activities related to strengthening, flexibility, endurance, ROM of scapular, scapulothoracic and UE control with self care, reaching, carrying, lifting, house/yardwork, driving/computer work  [] (72646) Reviewed/Progressed HEP activities related to improving balance, coordination, kinesthetic sense, posture, motor skill, proprioception of scapular, scapulothoracic and UE control with self care, reaching, carrying, lifting, house/yardwork, driving/computer work      Manual Treatments:  PROM / STM / Oscillations-Mobs:  G-I, II, III, IV (PA's, Inf., Post.)  [x] (10555) Provided manual therapy to mobilize soft tissue/joints of cervical/CT, scapular GHJ and UE for the purpose of modulating pain, promoting relaxation,  increasing ROM, reducing/eliminating soft tissue swelling/inflammation/restriction, improving soft tissue extensibility and allowing for proper ROM for normal function with self care, reaching, carrying, lifting, house/yardwork, driving/computer work    Charges:  Timed Code Treatment Minutes: 40   Total Treatment Minutes: 55       [] EVAL (LOW) 90911 (typically 20 minutes face-to-face)  [] EVAL (MOD) 50273 (typically 30 minutes face-to-face)  [] EVAL (HIGH) 22019 (typically 45 minutes face-to-face)  [] RE-EVAL     [x] [place](08659) x     [] Dry needle 1 or 2 Muscles (78326)  [x] NMR (90226) x     [] Dry needle 3+ Muscles (05466)  [x] Manual (98915) x     [] Ultrasound (63570) x  [] TA (32062) x     [] Mech Traction (70157)  [] ES(attended) (08918)     [x] ES (un) (57837):   [] Vasopump (58800) [] Ionto (54376)   [] Other:    Approval Dates: 12/15/21 to 2/28/22  CPT Code Units Approved Units Used  Date Updated: 1/6   Eval  1    Ex 96 6   Man 96 6   NMR 96 4   TA 96    Estim 96 5     GOALS:  Patient stated goal: \"Be able to return to work\"  []? Progressing: []? Met: []? Not Met: []? Adjusted     Therapist goals for Patient:   Short Term Goals: To be achieved in: 2 weeks  1. Independent in HEP and progression per patient tolerance, in order to prevent re-injury. []? Progressing: []? Met: []? Not Met: []? Adjusted  2. Patient will have a decrease in painby 40% to facilitate improvement in movement, function, and ADLs as indicated by Functional Deficits. []? Progressing: []? Met: []? Not Met: []? Adjusted     Long Term Goals: To be achieved in: at discharge  1. Disability index score of 30% or less for the UEFS to assist with reaching prior level of function. []? Progressing: []? Met: []? Not Met: []? Adjusted  2. Patient will demonstrate increased AROM to flex/abd = 160 and ER= 75 to allow for proper joint functioning as indicated by Functional Deficits. []? Progressing: []? Met: []? Not Met: []? Adjusted  3. Patient will demonstrate an increase in NM recruitment/activation and overall GH and scapular strength to 4/5 for proper functional mobility as indicated by patients Functional Deficits. []? Progressing: []? Met: []? Not Met: []? Adjusted  4. Patient will return to dressing independently without increased symptoms or restriction. []? Progressing: []? Met: []? Not Met: []? Adjusted  5. Patient will return to driving without increased symptoms or restriction. []? Progressing: []? Met: []? Not Met: []? Adjusted    ASSESSMENT:  Patient with good tolerance to PT session today. Patient demonstrates improved tolerance and increased range with PROM. Fatigues quickly with scap stabilization exercises and requires cues to turn off UT. Patient will benefit from continued skilled care to progress rehab per MD protocol.      Treatment/Activity Tolerance:  [x] Patient tolerated treatment well [] Patient limited by fatique  [] Patient limited by pain  [] Patient limited by other medical complications  [] Other:     Overall Progression Towards Functional goals/ Treatment Progress Update:  [] Patient is progressing as expected towards functional goals listed. [] Progression is slowed due to complexities/Impairments listed. [] Progression has been slowed due to co-morbidities. [x] Plan just implemented, too soon to assess goals progression <30days   [] Goals require adjustment due to lack of progress  [] Patient is not progressing as expected and requires additional follow up with physician  [] Other    Prognosis for POC: [x] Good [] Fair  [] Poor    Patient requires continued skilled intervention: [x] Yes  [] No      PLAN:Progress AAROM/AROM as able. Focus on scap stabilizers to decrease AC joint irritation. [x] Continue per plan of care [] Alter current plan (see comments)  [] Plan of care initiated [] Hold pending MD visit [] Discharge    Electronically signed by: Lucy Dove, PT , OMT-C,  [id number]      Note: If patient does not return for scheduled/recommended follow up visits, this note will serve as a discharge from care along with the most recent update on progress.

## 2022-01-10 ENCOUNTER — HOSPITAL ENCOUNTER (OUTPATIENT)
Dept: PHYSICAL THERAPY | Age: 42
Setting detail: THERAPIES SERIES
Discharge: HOME | End: 2022-01-10
Payer: COMMERCIAL

## 2022-01-10 NOTE — FLOWSHEET NOTE
[facility], [facility]  [address] [facility] [place], [facility]  Phone: [phone]   Fax:     [phone]    Physical Therapy  Cancellation/No-show Note  Patient Name:  Dennis Steven  :  1980   Date:  1/10/2022  Cancelled visits to date: 2  No-shows to date: 0    Patient status for today's appointment patient:  [x]  Cancelled  []  Rescheduled appointment  []  No-show     Reason given by patient:  [x]  Patient ill - stomach is not feeling well  []  Conflicting appointment  []  No transportation    []  Conflict with work  []  No reason given  []  Other:     Comments:      Phone call information:   []  Phone call made today to patient at _ time at number provided:      []  Patient answered, conversation as follows:    []  Patient did not answer, message left as follows:  []  Phone call not made today    Electronically signed by:  Karmen Copeland, [address] [facility] , T-C,  [id number]

## 2022-01-13 ENCOUNTER — HOSPITAL ENCOUNTER (OUTPATIENT)
Dept: PHYSICAL THERAPY | Age: 42
Setting detail: THERAPIES SERIES
Discharge: HOME | End: 2022-01-13
Payer: COMMERCIAL

## 2022-01-13 PROCEDURE — 97112 NEUROMUSCULAR REEDUCATION: CPT

## 2022-01-13 PROCEDURE — 97140 MANUAL THERAPY 1/> REGIONS: CPT

## 2022-01-13 PROCEDURE — G0283 ELEC STIM OTHER THAN WOUND: HCPCS

## 2022-01-13 PROCEDURE — 97110 THERAPEUTIC EXERCISES: CPT

## 2022-01-13 NOTE — FLOWSHEET NOTE
[facility], [facility]  [address] [facility] [facility] [place], [facility]  Phone: [phone]   Fax:     [phone]        Physical Therapy Treatment Note/ Progress Report:       Date:  2022    Patient Name:  Kamla Garay    :  1980  MRN: [id number]    Pertinent Medical History:asthma, depression    Medical/Treatment Diagnosis Information:  · Diagnosis: S/P R shoulder scope with SAD,DCE, RTC debridement, and labral repair  · Treatment Diagnosis: Decreased mobility, strength    Insurance/Certification information:  PT Insurance Information: [facility]  Physician Information:  Referring Practitioner: Clemencia Nance MD  Plan of care signed (Y/N): faxed 12/15    Date of Patient follow up with Physician:      Progress Report: []  Yes  [x]  No     Date Range for reporting period:  Beginnin/15/2021  Ending:      Progress report due (10 Rx/or 30 days whichever is less):     Recertification due (POC duration/ or 90 days whichever is less): 21    Visit # POC/Insurance Allowable Auth Needed    eval + 96 units 12/15-22 [x]Yes    []No     Functional Outcomes Measure:    Date Assessed: at eval  Test: UEFS  Score: 23/80 = 71% disability    Pain level:  1/10     History of Injury: Patient underwent L shoulder scope with SAD, DCE, RTC debridement, and labral repair/debridement on 21. Patient notes no complications with surgery. Currently wearing sling at all times when she is up and moving. Patient notes taking her sling off when she is relaxing at home. Currently getting assistance from family with ADLs like dressing and cooking. Patient has also been icing her shoulder as needed. SUBJECTIVE:  :  Shoulder is feeling better. Was sore after last session but calmed down the next day. : shoulder is doing well. No c/o pain currently. To PT without sling today.    1/3: shoulder is doing ok. Notes have some rubbing at [facility] joint. 1/6: had to take ibuprofen after last session but shoulder was not bad.    1/13: shoulder is coming along. Has been able to use arm more around the house. OBJECTIVE:    Observation:   · Palpation: mod TTP AC joint, bicep tendon, UT  · Functional Mobility/Transfers: Pain in shoulder with going from sit to supine on table. · Posture: Pt to PT with sling on. Needs cueing to relax UT with arm at side. · Bandages/Dressings/Incisions: stitches in place with no signs of infection and covered with bandaids.  Test measurements:    ROM:  Date      Shldr flexion    Shldr abd  Shldr IR         Shldr ER   A P A P A P A P   Eval 12/15  15  50  40  40                Strength:  Date Shoulder flexion Shoulder abduction Shoulder IR Shoulder  ER Bicep   Eval 12/15 NT NT NT NT NT           NT=not tested due to MD protocol    RESTRICTIONS/PRECAUTIONS: Pt s/p L shoulder scope with SAD, DCE, RTC debridement, and labral repair/debridement on 12/13/21.      Exercises/Interventions:   Therapeutic Ex (37699)  Min: 20 Resistance/Reps Notes/Cues   Left SAD, DCE, RTC/labral debridement  12/13/21     No restrictions per MD, just pain guided progression   Pulley flexion 10x    UE ranger:  *flexion  *scaption   10x L  10x L Keep range below 90 to avoid AC joint irritation        Standing cane: ER  extension 10x with elbow by side  10x     Bicep curl 1# 2x10 L    Tband row             Extension             triceps Red 10x  Red 10x  Red 10x  Cueing required to relax UT        Supine cane:  *ER  *chest press   10x L  5x with min A         SL ER 10x L          Therapeutic Activity (78170) Min:                     [facility] re-education (42872)  Min: 8     SL scap:  *retraction/protraction  *depression   10x AROM  10x AROM    Shldr isometrics sub-max all directions in neutral 10x 5 sec holds     Stand scap retraction with arm supported on swiss ball on table Add          Manual Intervention (28682) Min: 12     Shoulder PROM 4-way x12 mins - gentle              Modalities:  Min 15     IFC with CP x15 mins L shoulder Seated in recliner          Other Therapeutic Activities:  Pt was educated on PT POC, Diagnosis, Prognosis, pathomechanics as well as frequency and duration of scheduling future physical therapy appointments. Time was also taken on this day to answer all patient questions and participation in PT. Reviewed appointment policy in detail with patient and patient verbalized understanding. Home Exercise Program: Patient instructed in the following for HEP:           Access Code: [email or web address]  Seated Shoulder Shrugs - 2 x daily - 7 x weekly - 2 sets - 10 reps  Seated Scapular Retraction - 2 x daily - 7 x weekly - 1 sets - 10 reps  Bicep curls - 2 x daily - 7 x weekly - 2 sets - 10 reps  Composite Wrist and Finger Flexion and Extension - 2 x daily - 7 x weekly - 2 sets - 10 reps  Seated Forearm Pronation Supination AROM - 2 x daily - 7 x weekly - 2 sets - 10 reps  Towel Roll Squeeze - 2 x daily - 7 x weekly - 2 sets - 10 reps  Seated Shoulder Flexion Towel Slide at Table Top - 2 x daily - 7 x weekly - 1 sets - 10 reps  Patient verbalized/demonstrated understanding and was issued written handout. Therapeutic Exercise and NMR EXR  [x] (13194) Provided verbal/tactile cueing for activities related to strengthening, flexibility, endurance, ROM  for improvements in scapular, scapulothoracic and UE control with self care, reaching, carrying, lifting, house/yardwork, driving/computer work.    [] (15241) Provided verbal/tactile cueing for activities related to improving balance, coordination, kinesthetic sense, posture, motor skill, proprioception  to assist with  scapular, scapulothoracic and UE control with self care, reaching, carrying, lifting, house/yardwork, driving/computer work.     Therapeutic Activities:    [] (92933 or ) Provided verbal/tactile cueing for activities related to improving balance, coordination, kinesthetic sense, posture, motor skill, proprioception and motor activation to allow for proper function of scapular, scapulothoracic and UE control with self care, carrying, lifting, driving/computer work.      Home Exercise Program:    [x] (49055) Reviewed/Progressed HEP activities related to strengthening, flexibility, endurance, ROM of scapular, scapulothoracic and UE control with self care, reaching, carrying, lifting, house/yardwork, driving/computer work  [] (90533) Reviewed/Progressed HEP activities related to improving balance, coordination, kinesthetic sense, posture, motor skill, proprioception of scapular, scapulothoracic and UE control with self care, reaching, carrying, lifting, house/yardwork, driving/computer work      Manual Treatments:  PROM / STM / Oscillations-Mobs:  G-I, II, III, IV (PA's, Inf., Post.)  [x] (09679) Provided manual therapy to mobilize soft tissue/joints of cervical/CT, scapular GHJ and UE for the purpose of modulating pain, promoting relaxation,  increasing ROM, reducing/eliminating soft tissue swelling/inflammation/restriction, improving soft tissue extensibility and allowing for proper ROM for normal function with self care, reaching, carrying, lifting, house/yardwork, driving/computer work    Charges:  Timed Code Treatment Minutes: 40   Total Treatment Minutes: 55       [] EVAL (LOW) 46005 (typically 20 minutes face-to-face)  [] EVAL (MOD) 64548 (typically 30 minutes face-to-face)  [] EVAL (HIGH) 77429 (typically 45 minutes face-to-face)  [] RE-EVAL     [x] NG(73128) x     [] Dry needle 1 or 2 Muscles (72203)  [x] NMR (21625) x     [] Dry needle 3+ Muscles (89228)  [x] Manual (96204) x     [] Ultrasound (23889) x  [] TA (02022) x     [] Mech Traction (12805)  [] ES(attended) (67898)     [x] ES (un) (63610):   [] Vasopump (41758) [] Ionto (78531)   [] Other:    Approval Dates: 12/15/21 to 2/28/22  CPT Code Units Approved Units Used  Date Updated: 1/13 Eval  1    Ex 96 7   Man 96 7   NMR 96 5   TA 96    Estim 96 6     GOALS:  Patient stated goal: \"Be able to return to work\"  []? Progressing: []? Met: []? Not Met: []? Adjusted     Therapist goals for Patient:   Short Term Goals: To be achieved in: 2 weeks  1. Independent in HEP and progression per patient tolerance, in order to prevent re-injury. []? Progressing: []? Met: []? Not Met: []? Adjusted  2. Patient will have a decrease in painby 40% to facilitate improvement in movement, function, and ADLs as indicated by Functional Deficits. []? Progressing: []? Met: []? Not Met: []? Adjusted     Long Term Goals: To be achieved in: at discharge  1. Disability index score of 30% or less for the UEFS to assist with reaching prior level of function. []? Progressing: []? Met: []? Not Met: []? Adjusted  2. Patient will demonstrate increased AROM to flex/abd = 160 and ER= 75 to allow for proper joint functioning as indicated by Functional Deficits. []? Progressing: []? Met: []? Not Met: []? Adjusted  3. Patient will demonstrate an increase in NM recruitment/activation and overall GH and scapular strength to 4/5 for proper functional mobility as indicated by patients Functional Deficits. []? Progressing: []? Met: []? Not Met: []? Adjusted  4. Patient will return to dressing independently without increased symptoms or restriction. []? Progressing: []? Met: []? Not Met: []? Adjusted  5. Patient will return to driving without increased symptoms or restriction. []? Progressing: []? Met: []? Not Met: []? Adjusted    ASSESSMENT:  Patient with good tolerance to PT session today. Patient demonstrates improved tolerance and increased range with PROM. Fatigues quickly with scap stabilization exercises and requires cues to turn off UT. AC joint irritation is limiting overhead motion. Patient will benefit from continued skilled care to progress rehab per MD protocol.      Treatment/Activity Tolerance:  [x] Patient tolerated treatment well [] Patient limited by fatique  [] Patient limited by pain  [] Patient limited by other medical complications  [] Other:     Overall Progression Towards Functional goals/ Treatment Progress Update:  [] Patient is progressing as expected towards functional goals listed. [] Progression is slowed due to complexities/Impairments listed. [] Progression has been slowed due to co-morbidities. [x] Plan just implemented, too soon to assess goals progression <30days   [] Goals require adjustment due to lack of progress  [] Patient is not progressing as expected and requires additional follow up with physician  [] Other    Prognosis for POC: [x] Good [] Fair  [] Poor    Patient requires continued skilled intervention: [x] Yes  [] No      PLAN:Progress AAROM/AROM as able. Focus on scap stabilizers to decrease AC joint irritation. [x] Continue per plan of care [] Alter current plan (see comments)  [] Plan of care initiated [] Hold pending MD visit [] Discharge    Electronically signed by: Radha Pappas PT , OMT-C,  [id number]      Note: If patient does not return for scheduled/recommended follow up visits, this note will serve as a discharge from care along with the most recent update on progress.

## 2022-01-17 ENCOUNTER — HOSPITAL ENCOUNTER (OUTPATIENT)
Dept: PHYSICAL THERAPY | Age: 42
Setting detail: THERAPIES SERIES
Discharge: HOME | End: 2022-01-17
Payer: COMMERCIAL

## 2022-01-17 PROCEDURE — 97112 NEUROMUSCULAR REEDUCATION: CPT

## 2022-01-17 PROCEDURE — 97140 MANUAL THERAPY 1/> REGIONS: CPT

## 2022-01-17 PROCEDURE — 97110 THERAPEUTIC EXERCISES: CPT

## 2022-01-17 PROCEDURE — G0283 ELEC STIM OTHER THAN WOUND: HCPCS

## 2022-01-17 NOTE — FLOWSHEET NOTE
[facility]  [address] [facility]  Phone: [phone]   Fax:     [phone]        Physical Therapy Treatment Note/ Progress Report:       Date:  2022    Patient Name:  Yin Machuca    :  1980  MRN: [id number]    Pertinent Medical History:asthma, depression    Medical/Treatment Diagnosis Information:  · Diagnosis: S/P R shoulder scope with SAD,DCE, RTC debridement, and labral repair  · Treatment Diagnosis: Decreased mobility, strength    Insurance/Certification information:  PT Insurance Information: [facility]  Physician Information:  Referring Practitioner: Jose De Jesus Dangelo MD  Plan of care signed (Y/N): faxed 12/15    Date of Patient follow up with Physician:      Progress Report: []  Yes  [x]  No     Date Range for reporting period:  Beginnin/15/2021  Ending:      Progress report due (10 Rx/or 30 days whichever is less): 91    Recertification due (POC duration/ or 90 days whichever is less): 21    Visit # POC/Insurance Allowable Auth Needed    eval + 96 units 12/15-22 [x]Yes    []No     Functional Outcomes Measure:    Date Assessed: at eval  Test: UEFS  Score: 23/80 = 71% disability    Pain level:  1/10     History of Injury: Patient underwent L shoulder scope with SAD, DCE, RTC debridement, and labral repair/debridement on 21. Patient notes no complications with surgery. Currently wearing sling at all times when she is up and moving. Patient notes taking her sling off when she is relaxing at home. Currently getting assistance from family with ADLs like dressing and cooking. Patient has also been icing her shoulder as needed. SUBJECTIVE:  :  Shoulder is feeling better. Was sore after last session but calmed down the next day. : shoulder is doing well. No c/o pain currently. To PT without sling today.    1/3: shoulder is doing ok. Notes have some rubbing at [facility] joint. 1/6: had to take ibuprofen after last session but shoulder was not bad.    1/13: shoulder is coming along. Has been able to use arm more around the house. 1/17: notes she has been able to lift arm up a little higher when stretching without pain. Arm gets sore with increased activity but able to manage with ibuprofen and ice. OBJECTIVE:    Observation:   · Palpation: mod TTP AC joint, bicep tendon, UT  · Functional Mobility/Transfers: Pain in shoulder with going from sit to supine on table. · Posture: Pt to PT with sling on. Needs cueing to relax UT with arm at side. · Bandages/Dressings/Incisions: stitches in place with no signs of infection and covered with bandaids.  Test measurements:    ROM:  Date      Shldr flexion    Shldr abd  Shldr IR         Shldr ER   A P A P A P A P   Eval 12/15  15  50  40  40                Strength:  Date Shoulder flexion Shoulder abduction Shoulder IR Shoulder  ER Bicep   Eval 12/15 NT NT NT NT NT           NT=not tested due to MD protocol    RESTRICTIONS/PRECAUTIONS: Pt s/p L shoulder scope with SAD, DCE, RTC debridement, and labral repair/debridement on 12/13/21.      Exercises/Interventions:   Therapeutic Ex (38844)  Min: 20 Resistance/Reps Notes/Cues   Left SAD, DCE, RTC/labral debridement  12/13/21     No restrictions per MD, just pain guided progression   Pulley flexion 10x    UE ranger:  *flexion  *scaption   10x L  10x L Keep range below 90 to avoid AC joint irritation        Standing cane: ER  extension 10x with elbow by side  10x     Bicep curl 1# 2x10 L    Tband row             Extension             Triceps             IR Red 10x  Red 10x  Red 10x   add Cueing required to relax UT        Supine cane:  *ER  *chest press  *serratus punch   10x L  2x5 independently   add         SL ER 10x L          Therapeutic Activity (59025) Min:                     NMR re-education (22578)  Min: 8     SL scap:  *retraction/protraction  *depression   10x AROM  10x AROM    Shldr isometrics sub-max all directions in neutral 10x 5 sec holds     Stand scap retraction with arm supported on swiss ball on table Green ball 10x , hold 5 secs         Manual Intervention (10680) Min: 12     Shoulder PROM 4-way x12 mins - gentle              Modalities:  Min 15     IFC with CP x15 mins L shoulder Seated in recliner          Other Therapeutic Activities:  Pt was educated on PT POC, Diagnosis, Prognosis, pathomechanics as well as frequency and duration of scheduling future physical therapy appointments. Time was also taken on this day to answer all patient questions and participation in PT. Reviewed appointment policy in detail with patient and patient verbalized understanding. Home Exercise Program: Patient instructed in the following for HEP:           Access Code: [email or web address]  Seated Shoulder Shrugs - 2 x daily - 7 x weekly - 2 sets - 10 reps  Seated Scapular Retraction - 2 x daily - 7 x weekly - 1 sets - 10 reps  Bicep curls - 2 x daily - 7 x weekly - 2 sets - 10 reps  Composite Wrist and Finger Flexion and Extension - 2 x daily - 7 x weekly - 2 sets - 10 reps  Seated Forearm Pronation Supination AROM - 2 x daily - 7 x weekly - 2 sets - 10 reps  Towel Roll Squeeze - 2 x daily - 7 x weekly - 2 sets - 10 reps  Seated Shoulder Flexion Towel Slide at Table Top - 2 x daily - 7 x weekly - 1 sets - 10 reps  Patient verbalized/demonstrated understanding and was issued written handout.     Therapeutic Exercise and NMR EXR  [x] (38045) Provided verbal/tactile cueing for activities related to strengthening, flexibility, endurance, ROM  for improvements in scapular, scapulothoracic and UE control with self care, reaching, carrying, lifting, house/yardwork, driving/computer work.    [] (56108) Provided verbal/tactile cueing for activities related to improving balance, coordination, kinesthetic sense, posture, motor skill, proprioception to assist with  scapular, scapulothoracic and UE control with self care, reaching, carrying, lifting, house/yardwork, driving/computer work. Therapeutic Activities:    [] (00794 or 37488) Provided verbal/tactile cueing for activities related to improving balance, coordination, kinesthetic sense, posture, motor skill, proprioception and motor activation to allow for proper function of scapular, scapulothoracic and UE control with self care, carrying, lifting, driving/computer work.      Home Exercise Program:    [x] (19400) Reviewed/Progressed HEP activities related to strengthening, flexibility, endurance, ROM of scapular, scapulothoracic and UE control with self care, reaching, carrying, lifting, house/yardwork, driving/computer work  [] (03794) Reviewed/Progressed HEP activities related to improving balance, coordination, kinesthetic sense, posture, motor skill, proprioception of scapular, scapulothoracic and UE control with self care, reaching, carrying, lifting, house/yardwork, driving/computer work      Manual Treatments:  PROM / STM / Oscillations-Mobs:  G-I, II, III, IV (PA's, Inf., Post.)  [x] (81369) Provided manual therapy to mobilize soft tissue/joints of cervical/CT, scapular GHJ and UE for the purpose of modulating pain, promoting relaxation,  increasing ROM, reducing/eliminating soft tissue swelling/inflammation/restriction, improving soft tissue extensibility and allowing for proper ROM for normal function with self care, reaching, carrying, lifting, house/yardwork, driving/computer work    Charges:  Timed Code Treatment Minutes: 40   Total Treatment Minutes: 55       [] EVAL (LOW) 13541 (typically 20 minutes face-to-face)  [] EVAL (MOD) 86437 (typically 30 minutes face-to-face)  [] EVAL (HIGH) 29915 (typically 45 minutes face-to-face)  [] RE-EVAL     [x] QV(48046) x     [] Dry needle 1 or 2 Muscles (57191)  [x] NMR (34562) x     [] Dry needle 3+ Muscles (29202)  [x] Manual (07618) x     [] Ultrasound (61046) x  [] TA (38178) x     [] Mech Traction (17544)  [] ES(attended) (93573)     [x] ES (un) (98038):   [] Vasopump (72706) [] Ionto (46576)   [] Other:    Approval Dates: 12/15/21 to 2/28/22  CPT Code Units Approved Units Used  Date Updated: 1/13   Eval  1    Ex 96 8   Man 96 8   NMR 96 6   TA 96    Estim 96 7     GOALS:  Patient stated goal: \"Be able to return to work\"  []? Progressing: []? Met: []? Not Met: []? Adjusted     Therapist goals for Patient:   Short Term Goals: To be achieved in: 2 weeks  1. Independent in HEP and progression per patient tolerance, in order to prevent re-injury. []? Progressing: []? Met: []? Not Met: []? Adjusted  2. Patient will have a decrease in painby 40% to facilitate improvement in movement, function, and ADLs as indicated by Functional Deficits. []? Progressing: []? Met: []? Not Met: []? Adjusted     Long Term Goals: To be achieved in: at discharge  1. Disability index score of 30% or less for the UEFS to assist with reaching prior level of function. []? Progressing: []? Met: []? Not Met: []? Adjusted  2. Patient will demonstrate increased AROM to flex/abd = 160 and ER= 75 to allow for proper joint functioning as indicated by Functional Deficits. []? Progressing: []? Met: []? Not Met: []? Adjusted  3. Patient will demonstrate an increase in NM recruitment/activation and overall GH and scapular strength to 4/5 for proper functional mobility as indicated by patients Functional Deficits. []? Progressing: []? Met: []? Not Met: []? Adjusted  4. Patient will return to dressing independently without increased symptoms or restriction. []? Progressing: []? Met: []? Not Met: []? Adjusted  5. Patient will return to driving without increased symptoms or restriction. []? Progressing: []? Met: []? Not Met: []? Adjusted    ASSESSMENT:  Patient with good tolerance to PT session today. Patient demonstrates improved tolerance and increased range with PROM.   Fatigues quickly with scap stabilization exercises and requires cues to turn off UT. AC joint irritation is limiting overhead motion. Patient will benefit from continued skilled care to progress rehab per MD protocol. Treatment/Activity Tolerance:  [x] Patient tolerated treatment well [] Patient limited by fatique  [] Patient limited by pain  [] Patient limited by other medical complications  [] Other:     Overall Progression Towards Functional goals/ Treatment Progress Update:  [] Patient is progressing as expected towards functional goals listed. [] Progression is slowed due to complexities/Impairments listed. [] Progression has been slowed due to co-morbidities. [x] Plan just implemented, too soon to assess goals progression <30days   [] Goals require adjustment due to lack of progress  [] Patient is not progressing as expected and requires additional follow up with physician  [] Other    Prognosis for POC: [x] Good [] Fair  [] Poor    Patient requires continued skilled intervention: [x] Yes  [] No      PLAN:Progress AAROM/AROM as able. Focus on scap stabilizers to decrease AC joint irritation. [x] Continue per plan of care [] Alter current plan (see comments)  [] Plan of care initiated [] Hold pending MD visit [] Discharge    Electronically signed by: Kishore Roblero PT , OMT-C,  [id number]      Note: If patient does not return for scheduled/recommended follow up visits, this note will serve as a discharge from care along with the most recent update on progress.

## 2022-01-20 ENCOUNTER — HOSPITAL ENCOUNTER (OUTPATIENT)
Dept: PHYSICAL THERAPY | Age: 42
Setting detail: THERAPIES SERIES
Discharge: HOME | End: 2022-01-20
Payer: COMMERCIAL

## 2022-01-20 PROCEDURE — 97110 THERAPEUTIC EXERCISES: CPT

## 2022-01-20 PROCEDURE — 97140 MANUAL THERAPY 1/> REGIONS: CPT

## 2022-01-20 PROCEDURE — G0283 ELEC STIM OTHER THAN WOUND: HCPCS

## 2022-01-20 NOTE — FLOWSHEET NOTE
[facility], [facility]  [address] [facility]  Phone: [phone]   Fax:     [phone]        Physical Therapy Treatment Note/ Progress Report:       Date:  2022    Patient Name:  Tracy Patel    :  1980  MRN: [id number]    Pertinent Medical History:asthma, depression    Medical/Treatment Diagnosis Information:  · Diagnosis: S/P R shoulder scope with SAD,DCE, RTC debridement, and labral repair  · Treatment Diagnosis: Decreased mobility, strength    Insurance/Certification information:  PT Insurance Information: [facility]  Physician Information:  Referring Practitioner: Wesley Walker MD  Plan of care signed (Y/N): faxed 12/15    Date of Patient follow up with Physician:      Progress Report: []  Yes  [x]  No     Date Range for reporting period:  Beginnin/15/2021  Ending:      Progress report due (10 Rx/or 30 days whichever is less):     Recertification due (POC duration/ or 90 days whichever is less): 21    Visit # POC/Insurance Allowable Auth Needed    eval + 96 units 12/15-22 [x]Yes    []No     Functional Outcomes Measure:    Date Assessed: at eval  Test: UEFS  Score: 23/80 = 71% disability    Pain level:  1/10     History of Injury: Patient underwent L shoulder scope with SAD, DCE, RTC debridement, and labral repair/debridement on 21. Patient notes no complications with surgery. Currently wearing sling at all times when she is up and moving. Patient notes taking her sling off when she is relaxing at home. Currently getting assistance from family with ADLs like dressing and cooking. Patient has also been icing her shoulder as needed. SUBJECTIVE:  :  Shoulder is feeling better. Was sore after last session but calmed down the next day. : shoulder is doing well. No c/o pain currently. To PT without sling today.    1/3: shoulder is doing ok. Notes have some rubbing at [facility] joint. 1/6: had to take ibuprofen after last session but shoulder was not bad.    1/13: shoulder is coming along. Has been able to use arm more around the house. 1/17: notes she has been able to lift arm up a little higher when stretching without pain. Arm gets sore with increased activity but able to manage with ibuprofen and ice.    1/20: patient notes she attempted to return to work this week on Tuesday evening. Patient states she was doing well until she had to lift a pt who was not able to help. Since this patient states she has had an increase in discomfort and pressure in her shoulder since then. Did use ice on her shoulder once and took some advil which really didn't help. OBJECTIVE:    Observation:   · Palpation: mod TTP AC joint, bicep tendon, UT  · Functional Mobility/Transfers: Pain in shoulder with going from sit to supine on table. · Posture: Pt to PT with sling on. Needs cueing to relax UT with arm at side. · Bandages/Dressings/Incisions: stitches in place with no signs of infection and covered with bandaids.  Test measurements:    ROM:  Date      Shldr flexion    Shldr abd  Shldr IR         Shldr ER   A P A P A P A P   Eval 12/15  15  50  40  40                Strength:  Date Shoulder flexion Shoulder abduction Shoulder IR Shoulder  ER Bicep   Eval 12/15 NT NT NT NT NT           NT=not tested due to MD protocol    RESTRICTIONS/PRECAUTIONS: Pt s/p L shoulder scope with SAD, DCE, RTC debridement, and labral repair/debridement on 12/13/21.      Exercises/Interventions:   Therapeutic Ex (35578)  Min: 20 Resistance/Reps Notes/Cues   Left SAD, DCE, RTC/labral debridement  12/13/21   NT  x2 mins No restrictions per MD, just pain guided progression   Pulley flexion 10x    UE ranger:  *flexion  *scaption   10x L  10x L Keep range below 90 to avoid AC joint irritation        Standing cane: ER  extension 10x with elbow by side  10x     Bicep curl 1# scapulothoracic and UE control with self care, reaching, carrying, lifting, house/yardwork, driving/computer work.    [] (30130) Provided verbal/tactile cueing for activities related to improving balance, coordination, kinesthetic sense, posture, motor skill, proprioception  to assist with  scapular, scapulothoracic and UE control with self care, reaching, carrying, lifting, house/yardwork, driving/computer work. Therapeutic Activities:    [] (94735 or 52745) Provided verbal/tactile cueing for activities related to improving balance, coordination, kinesthetic sense, posture, motor skill, proprioception and motor activation to allow for proper function of scapular, scapulothoracic and UE control with self care, carrying, lifting, driving/computer work.      Home Exercise Program:    [x] (06011) Reviewed/Progressed HEP activities related to strengthening, flexibility, endurance, ROM of scapular, scapulothoracic and UE control with self care, reaching, carrying, lifting, house/yardwork, driving/computer work  [] (09623) Reviewed/Progressed HEP activities related to improving balance, coordination, kinesthetic sense, posture, motor skill, proprioception of scapular, scapulothoracic and UE control with self care, reaching, carrying, lifting, house/yardwork, driving/computer work      Manual Treatments:  PROM / STM / Oscillations-Mobs:  G-I, II, III, IV (PA's, Inf., Post.)  [x] (33876) Provided manual therapy to mobilize soft tissue/joints of cervical/CT, scapular GHJ and UE for the purpose of modulating pain, promoting relaxation,  increasing ROM, reducing/eliminating soft tissue swelling/inflammation/restriction, improving soft tissue extensibility and allowing for proper ROM for normal function with self care, reaching, carrying, lifting, house/yardwork, driving/computer work    Charges:  Timed Code Treatment Minutes: 43   Total Treatment Minutes: 58       [] LANA (LOW) 09464 (typically 20 minutes face-to-face)  [] EVAL (MOD) 37939 (typically 30 minutes face-to-face)  [] EVAL (HIGH) 54290 (typically 45 minutes face-to-face)  [] RE-EVAL     [x] IF(70017) x     [] Dry needle 1 or 2 Muscles (59813)  [] NMR (80958) x     [] Dry needle 3+ Muscles (45357)  [x] Manual (61295) x     [] Ultrasound (55568) x  [] TA (11417) x     [] Mech Traction (07250)  [] ES(attended) (88936)     [x] ES (un) (11757):   [] Vasopump (88080) [] Ionto (43681)   [] Other:    Approval Dates: 12/15/21 to 2/28/22  CPT Code Units Approved Units Used  Date Updated: 1/20   Eval  1    Ex 96 10   Man 96 9   NMR 96 6   TA 96    Estim 96 8     GOALS:  Patient stated goal: \"Be able to return to work\"  []? Progressing: []? Met: []? Not Met: []? Adjusted     Therapist goals for Patient:   Short Term Goals: To be achieved in: 2 weeks  1. Independent in HEP and progression per patient tolerance, in order to prevent re-injury. []? Progressing: []? Met: []? Not Met: []? Adjusted  2. Patient will have a decrease in painby 40% to facilitate improvement in movement, function, and ADLs as indicated by Functional Deficits. []? Progressing: []? Met: []? Not Met: []? Adjusted     Long Term Goals: To be achieved in: at discharge  1. Disability index score of 30% or less for the UEFS to assist with reaching prior level of function. []? Progressing: []? Met: []? Not Met: []? Adjusted  2. Patient will demonstrate increased AROM to flex/abd = 160 and ER= 75 to allow for proper joint functioning as indicated by Functional Deficits. []? Progressing: []? Met: []? Not Met: []? Adjusted  3. Patient will demonstrate an increase in NM recruitment/activation and overall GH and scapular strength to 4/5 for proper functional mobility as indicated by patients Functional Deficits. []? Progressing: []? Met: []? Not Met: []? Adjusted  4. Patient will return to dressing independently without increased symptoms or restriction. []? Progressing: []? Met: []? Not Met: []? Adjusted  5.  Patient will return to driving without increased symptoms or restriction. []? Progressing: []? Met: []? Not Met: []? Adjusted    ASSESSMENT:  Patient with fair tolerance to PT session today. Patient with significant tightness today in L UT, infraspinatus, and teres with STM. Mod cueing needed with scap activation to turn off UT. Exercises modified due to increased c/o pain with early RTW. Will resume strengthening next visit as able. Treatment/Activity Tolerance:  [x] Patient tolerated treatment well [] Patient limited by fatique  [] Patient limited by pain  [] Patient limited by other medical complications  [] Other:     Overall Progression Towards Functional goals/ Treatment Progress Update:  [] Patient is progressing as expected towards functional goals listed. [] Progression is slowed due to complexities/Impairments listed. [] Progression has been slowed due to co-morbidities. [x] Plan just implemented, too soon to assess goals progression <30days   [] Goals require adjustment due to lack of progress  [] Patient is not progressing as expected and requires additional follow up with physician  [] Other    Prognosis for POC: [x] Good [] Fair  [] Poor    Patient requires continued skilled intervention: [x] Yes  [] No      PLAN:Progress AAROM/AROM as able. Focus on scap stabilizers to decrease AC joint irritation. [x] Continue per plan of care [] Alter current plan (see comments)  [] Plan of care initiated [] Hold pending MD visit [] Discharge    Electronically signed by: Anderson Fry PT , OMT-C,  [id number]      Note: If patient does not return for scheduled/recommended follow up visits, this note will serve as a discharge from care along with the most recent update on progress.

## 2022-01-24 ENCOUNTER — HOSPITAL ENCOUNTER (OUTPATIENT)
Dept: PHYSICAL THERAPY | Age: 42
Setting detail: THERAPIES SERIES
Discharge: HOME | End: 2022-01-24
Payer: COMMERCIAL

## 2022-01-24 NOTE — FLOWSHEET NOTE
[facility], [facility]  [address] [facility] [place], [facility]  Phone: [phone]   Fax:     [phone]    Physical Therapy  Cancellation/No-show Note  Patient Name:  Hillary Mittal  :  1980   Date:  2022  Cancelled visits to date: 3  No-shows to date: 0    Patient status for today's appointment patient:  [x]  Cancelled  []  Rescheduled appointment  []  No-show     Reason given by patient:  []  Patient ill   []  Conflicting appointment  []  No transportation    []  Conflict with work  [x]  No reason given  []  Other:     Comments:      Phone call information:   []  Phone call made today to patient at _ time at number provided:      []  Patient answered, conversation as follows:    []  Patient did not answer, message left as follows:  []  Phone call not made today    Electronically signed by:  Bayron Aguila , OMT-C,  [id number]

## 2022-01-27 ENCOUNTER — HOSPITAL ENCOUNTER (OUTPATIENT)
Dept: PHYSICAL THERAPY | Age: 42
Setting detail: THERAPIES SERIES
Discharge: HOME | End: 2022-01-27
Payer: COMMERCIAL

## 2022-01-27 PROCEDURE — 97112 NEUROMUSCULAR REEDUCATION: CPT

## 2022-01-27 PROCEDURE — G0283 ELEC STIM OTHER THAN WOUND: HCPCS

## 2022-01-27 PROCEDURE — 97110 THERAPEUTIC EXERCISES: CPT

## 2022-01-27 PROCEDURE — 97140 MANUAL THERAPY 1/> REGIONS: CPT

## 2022-01-27 NOTE — FLOWSHEET NOTE
[facility], [facility]  [address] [facility], [facility]  Phone: [phone]   Fax:     [phone]        Physical Therapy Treatment Note/ Progress Report:       Date:  2022    Patient Name:  Ike Cardona    :  1980  MRN: [id number]    Pertinent Medical History:asthma, depression    Medical/Treatment Diagnosis Information:  · Diagnosis: S/P R shoulder scope with SAD,DCE, RTC debridement, and labral repair  · Treatment Diagnosis: Decreased mobility, strength    Insurance/Certification information:  PT Insurance Information: [facility]  Physician Information:  Referring Practitioner: Maria Del Carmen Munoz MD  Plan of care signed (Y/N): faxed 12/15    Date of Patient follow up with Physician:      Progress Report: []  Yes  [x]  No     Date Range for reporting period:  Beginnin/15/2021  Ending:      Progress report due (10 Rx/or 30 days whichever is less): 24    Recertification due (POC duration/ or 90 days whichever is less): 21    Visit # POC/Insurance Allowable Auth Needed    eval + 96 units 12/15-22 [x]Yes    []No     Functional Outcomes Measure:    Date Assessed: at eval  Test: UEFS  Score: 23/80 = 71% disability    Pain level:  1/10     History of Injury: Patient underwent L shoulder scope with SAD, DCE, RTC debridement, and labral repair/debridement on 21. Patient notes no complications with surgery. Currently wearing sling at all times when she is up and moving. Patient notes taking her sling off when she is relaxing at home. Currently getting assistance from family with ADLs like dressing and cooking. Patient has also been icing her shoulder as needed. SUBJECTIVE:  :  Shoulder is feeling better. Was sore after last session but calmed down the next day. : shoulder is doing well. No c/o pain currently. To PT without sling today.    1/3: shoulder is doing ok. Notes have some rubbing at [facility] joint. 1/6: had to take ibuprofen after last session but shoulder was not bad.    1/13: shoulder is coming along. Has been able to use arm more around the house. 1/17: notes she has been able to lift arm up a little higher when stretching without pain. Arm gets sore with increased activity but able to manage with ibuprofen and ice.    1/20: patient notes she attempted to return to work this week on Tuesday evening. Patient states she was doing well until she had to lift a pt who was not able to help. Since this patient states she has had an increase in discomfort and pressure in her shoulder since then. Did use ice on her shoulder once and took some advil which really didn't help. 1/27: Arm continues to be sore since working last week. Has tried taking ibuprofen, ice, and heat with no relief. Patient notes she cleaned her house yesterday and arm was really sore. Pt states her main c/o pain is located along the neck and AC joint. OBJECTIVE:    Observation:   · Palpation: mod TTP AC joint, bicep tendon, UT  · Functional Mobility/Transfers: Pain in shoulder with going from sit to supine on table. · Posture: Pt to PT with sling on. Needs cueing to relax UT with arm at side. · Bandages/Dressings/Incisions: stitches in place with no signs of infection and covered with bandaids.  Test measurements:    ROM:  Date      Shldr flexion    Shldr abd  Shldr IR         Shldr ER   A P A P A P A P   Eval 12/15  15  50  40  40                Strength:  Date Shoulder flexion Shoulder abduction Shoulder IR Shoulder  ER Bicep   Eval 12/15 NT NT NT NT NT           NT=not tested due to MD protocol    RESTRICTIONS/PRECAUTIONS: Pt s/p L shoulder scope with SAD, DCE, RTC debridement, and labral repair/debridement on 12/13/21.      Exercises/Interventions:   Therapeutic Ex (11648)  Min: 20 Resistance/Reps Notes/Cues   Left SAD, DCE, RTC/labral debridement  12/13/21   NT  x2 Shoulder Flexion Towel Slide at Table Top - 2 x daily - 7 x weekly - 1 sets - 10 reps  Patient verbalized/demonstrated understanding and was issued written handout. Therapeutic Exercise and NMR EXR  [x] (31997) Provided verbal/tactile cueing for activities related to strengthening, flexibility, endurance, ROM  for improvements in scapular, scapulothoracic and UE control with self care, reaching, carrying, lifting, house/yardwork, driving/computer work.    [] (46176) Provided verbal/tactile cueing for activities related to improving balance, coordination, kinesthetic sense, posture, motor skill, proprioception  to assist with  scapular, scapulothoracic and UE control with self care, reaching, carrying, lifting, house/yardwork, driving/computer work. Therapeutic Activities:    [] (73945 or 21259) Provided verbal/tactile cueing for activities related to improving balance, coordination, kinesthetic sense, posture, motor skill, proprioception and motor activation to allow for proper function of scapular, scapulothoracic and UE control with self care, carrying, lifting, driving/computer work.      Home Exercise Program:    [x] (42625) Reviewed/Progressed HEP activities related to strengthening, flexibility, endurance, ROM of scapular, scapulothoracic and UE control with self care, reaching, carrying, lifting, house/yardwork, driving/computer work  [] (21582) Reviewed/Progressed HEP activities related to improving balance, coordination, kinesthetic sense, posture, motor skill, proprioception of scapular, scapulothoracic and UE control with self care, reaching, carrying, lifting, house/yardwork, driving/computer work      Manual Treatments:  PROM / STM / Oscillations-Mobs:  G-I, II, III, IV (PA's, Inf., Post.)  [x] (12984) Provided manual therapy to mobilize soft tissue/joints of cervical/CT, scapular GHJ and UE for the purpose of modulating pain, promoting relaxation,  increasing ROM, reducing/eliminating soft tissue swelling/inflammation/restriction, improving soft tissue extensibility and allowing for proper ROM for normal function with self care, reaching, carrying, lifting, house/yardwork, driving/computer work    Charges:  Timed Code Treatment Minutes: 40   Total Treatment Minutes: 55       [] EVAL (LOW) 04918 (typically 20 minutes face-to-face)  [] EVAL (MOD) 13809 (typically 30 minutes face-to-face)  [] EVAL (HIGH) 67056 (typically 45 minutes face-to-face)  [] RE-EVAL     [x] FW(96246) x     [] Dry needle 1 or 2 Muscles (83935)  [x] NMR (08654) x     [] Dry needle 3+ Muscles (19499)  [x] Manual (28452) x     [] Ultrasound (06090) x  [] TA (69244) x     [] Mech Traction (40791)  [] ES(attended) (78228)     [x] ES (un) (17819):   [] Vasopump (06511) [] Ionto (86746)   [] Other:    Approval Dates: 12/15/21 to 2/28/22  CPT Code Units Approved Units Used  Date Updated: 1/27   Eval  1    Ex 96 11   Man 96 10   NMR 96 7   TA 96    Estim 96 9     GOALS:  Patient stated goal: \"Be able to return to work\"  []? Progressing: []? Met: []? Not Met: []? Adjusted     Therapist goals for Patient:   Short Term Goals: To be achieved in: 2 weeks  1. Independent in HEP and progression per patient tolerance, in order to prevent re-injury. []? Progressing: []? Met: []? Not Met: []? Adjusted  2. Patient will have a decrease in painby 40% to facilitate improvement in movement, function, and ADLs as indicated by Functional Deficits. []? Progressing: []? Met: []? Not Met: []? Adjusted     Long Term Goals: To be achieved in: at discharge  1. Disability index score of 30% or less for the UEFS to assist with reaching prior level of function. []? Progressing: []? Met: []? Not Met: []? Adjusted  2. Patient will demonstrate increased AROM to flex/abd = 160 and ER= 75 to allow for proper joint functioning as indicated by Functional Deficits. []? Progressing: []? Met: []? Not Met: []? Adjusted  3.  Patient will demonstrate an increase in NM recruitment/activation and overall GH and scapular strength to 4/5 for proper functional mobility as indicated by patients Functional Deficits. []? Progressing: []? Met: []? Not Met: []? Adjusted  4. Patient will return to dressing independently without increased symptoms or restriction. []? Progressing: []? Met: []? Not Met: []? Adjusted  5. Patient will return to driving without increased symptoms or restriction. []? Progressing: []? Met: []? Not Met: []? Adjusted    ASSESSMENT:  Patient with fair tolerance to PT session today. Patient with significant tightness today in L UT, infraspinatus, and teres with STM. Mod cueing needed with scap activation to turn off UT. Exercises modified due to continued increased c/o pain with early RTW. Will resume strengthening next visit as able. Treatment/Activity Tolerance:  [x] Patient tolerated treatment well [] Patient limited by fatique  [] Patient limited by pain  [] Patient limited by other medical complications  [] Other:     Overall Progression Towards Functional goals/ Treatment Progress Update:  [] Patient is progressing as expected towards functional goals listed. [] Progression is slowed due to complexities/Impairments listed. [] Progression has been slowed due to co-morbidities. [x] Plan just implemented, too soon to assess goals progression <30days   [] Goals require adjustment due to lack of progress  [] Patient is not progressing as expected and requires additional follow up with physician  [] Other    Prognosis for POC: [x] Good [] Fair  [] Poor    Patient requires continued skilled intervention: [x] Yes  [] No      PLAN:Progress AAROM/AROM as able. Focus on scap stabilizers to decrease AC joint irritation.    [x] Continue per plan of care [] Alter current plan (see comments)  [] Plan of care initiated [] Hold pending MD visit [] Discharge    Electronically signed by: Frederic Dey, PT , OMT-C,  [id number]      Note: If patient does not return for scheduled/recommended follow up visits, this note will serve as a discharge from care along with the most recent update on progress.

## 2022-01-31 ENCOUNTER — HOSPITAL ENCOUNTER (OUTPATIENT)
Dept: PHYSICAL THERAPY | Age: 42
Setting detail: THERAPIES SERIES
Discharge: HOME | End: 2022-01-31
Payer: COMMERCIAL

## 2022-01-31 PROCEDURE — G0283 ELEC STIM OTHER THAN WOUND: HCPCS

## 2022-01-31 PROCEDURE — 97140 MANUAL THERAPY 1/> REGIONS: CPT

## 2022-01-31 PROCEDURE — 97110 THERAPEUTIC EXERCISES: CPT

## 2022-01-31 PROCEDURE — 97112 NEUROMUSCULAR REEDUCATION: CPT

## 2022-01-31 NOTE — PROGRESS NOTES
[facility]el and [facility]  [address] [facility]  Phone: [phone]   Fax:     [phone]        Physical Therapy Treatment Note/ Progress Report:       Date:  2022    Patient Name:  Erin Armstrong    :  1980  MRN: [id number]    Pertinent Medical History:asthma, depression    Medical/Treatment Diagnosis Information:  · Diagnosis: S/P R shoulder scope with SAD,DCE, RTC debridement, and labral repair  · Treatment Diagnosis: Decreased mobility, strength    Insurance/Certification information:  PT Insurance Information: [facility]  Physician Information:  Referring Practitioner: Deandra Stanley MD  Plan of care signed (Y/N): faxed 12/15    Date of Patient follow up with Physician:      Progress Report: [x]  Yes  []  No     Date Range for reporting period:  Beginnin/15/2021  Endin22    Progress report due (10 Rx/or 30 days whichever is less):     Recertification due (POC duration/ or 90 days whichever is less): 21    Visit # POC/Insurance Allowable Auth Needed   10/12 eval + 96 units 12/15-22 [x]Yes    []No     Functional Outcomes Measure:    Date Assessed: at eval  Test: UEFS  Score: 23/80 = 71% disability    Pain level:  1/10     History of Injury: Patient underwent L shoulder scope with SAD, DCE, RTC debridement, and labral repair/debridement on 21. Patient notes no complications with surgery. Currently wearing sling at all times when she is up and moving. Patient notes taking her sling off when she is relaxing at home. Currently getting assistance from family with ADLs like dressing and cooking. Patient has also been icing her shoulder as needed. SUBJECTIVE:  :  Shoulder is feeling better. Was sore after last session but calmed down the next day. : shoulder is doing well. No c/o pain currently. To PT without sling today.    1/3: shoulder is doing ok. Notes have some rubbing at [facility] joint. 1/6: had to take ibuprofen after last session but shoulder was not bad.    1/13: shoulder is coming along. Has been able to use arm more around the house. 1/17: notes she has been able to lift arm up a little higher when stretching without pain. Arm gets sore with increased activity but able to manage with ibuprofen and ice.    1/20: patient notes she attempted to return to work this week on Tuesday evening. Patient states she was doing well until she had to lift a pt who was not able to help. Since this patient states she has had an increase in discomfort and pressure in her shoulder since then. Did use ice on her shoulder once and took some advil which really didn't help. 1/27: Arm continues to be sore since working last week. Has tried taking ibuprofen, ice, and heat with no relief. Patient notes she cleaned her house yesterday and arm was really sore. Pt states her main c/o pain is located along the neck and AC joint. 1/31: Patient reports that her shoulder is slowly coming along. Patient reports 50-60% improvement overall. Notes she gets a decent amount of popping in her shoulder. Patient states she has returned to driving but still has difficulty reaching up for the steering wheel. Patient can now get dressed independently but has some difficulty depending on what type of shirt she is putting on.      OBJECTIVE:    Observation: Updated 1/31/22  · Palpation: mod TTP AC joint/UT  · Functional Mobility/Transfers: independent  · Posture: cueing needed to decrease UT activation with forward elevation   · Bandages/Dressings/Incisions: incisions healed with no signs of infection    Test measurements:    ROM:  Date      Shldr flexion    Shldr abd  Shldr IR         Shldr ER   A P A P A P A P   Eval 12/15  15  50  40  40   1/31/21 105 120 110 140  75  80     Strength:  Date Shoulder flexion Shoulder abduction Shoulder IR Shoulder  ER Bicep Eval 12/15 NT NT NT NT NT   1/31/21 3+/5 3+/5 4/5 3+/5 4/5   NT=not tested due to MD protocol    RESTRICTIONS/PRECAUTIONS: Pt s/p L shoulder scope with SAD, DCE, RTC debridement, and labral repair/debridement on 12/13/21. Exercises/Interventions:   Therapeutic Ex (86443)  Min: 20 Resistance/Reps Notes/Cues   Left SAD, DCE, RTC/labral debridement  12/13/21   NT  x2 mins No restrictions per MD, just pain guided progression   Pulley flexion 10x    UE ranger:  *flexion  *scaption   10x L  10x L Keep range below 90 to avoid AC joint irritation        Standing cane: ER  extension 10x with elbow by side  10x     Bicep curl 2# 2x10 L    Tband row             Extension             Triceps             IR             adduction Red 20x  Red 20x  Red 20x   Red 20x L  Red 20x L Cueing required to relax UT        Supine cane:  *ER  *chest press  *serratus punch 10x L    0# 10x L      PT assist to maintain arm in 90` flexion       SL ER        Flexion with UE ranger 10x L   add        Therapeutic Activity (05486) Min:                     NMR re-education (69578)  Min: 10     SL scap:  *retraction/protraction  *depression 10x with PT manual resist  10x AROM   Shldr isometrics sub-max flex, abd, ER in neutral 5x 5 sec holds , gentle   Stand scap retraction with arm supported on swiss ball on table Green ball 10x , hold 5 secs         Manual Intervention (51326) Min: 10     Shoulder PROM 4-way x5 mins - gentle    STM L UT/post RTC x5 mins         Modalities:  Min 15     IFC with CP x15 mins L shoulder Seated in recliner          Other Therapeutic Activities:  Pt was educated on PT POC, Diagnosis, Prognosis, pathomechanics as well as frequency and duration of scheduling future physical therapy appointments. Time was also taken on this day to answer all patient questions and participation in PT. Reviewed appointment policy in detail with patient and patient verbalized understanding.      Home Exercise Program: Patient instructed in the following for HEP:           Access Code: [email or web address]  Seated Shoulder Shrugs - 2 x daily - 7 x weekly - 2 sets - 10 reps  Seated Scapular Retraction - 2 x daily - 7 x weekly - 1 sets - 10 reps  Bicep curls - 2 x daily - 7 x weekly - 2 sets - 10 reps  Composite Wrist and Finger Flexion and Extension - 2 x daily - 7 x weekly - 2 sets - 10 reps  Seated Forearm Pronation Supination AROM - 2 x daily - 7 x weekly - 2 sets - 10 reps  Towel Roll Squeeze - 2 x daily - 7 x weekly - 2 sets - 10 reps  Seated Shoulder Flexion Towel Slide at Table Top - 2 x daily - 7 x weekly - 1 sets - 10 reps  Patient verbalized/demonstrated understanding and was issued written handout. Therapeutic Exercise and NMR EXR  [x] (84783) Provided verbal/tactile cueing for activities related to strengthening, flexibility, endurance, ROM  for improvements in scapular, scapulothoracic and UE control with self care, reaching, carrying, lifting, house/yardwork, driving/computer work.    [] (10665) Provided verbal/tactile cueing for activities related to improving balance, coordination, kinesthetic sense, posture, motor skill, proprioception  to assist with  scapular, scapulothoracic and UE control with self care, reaching, carrying, lifting, house/yardwork, driving/computer work. Therapeutic Activities:    [] (22226 or 44099) Provided verbal/tactile cueing for activities related to improving balance, coordination, kinesthetic sense, posture, motor skill, proprioception and motor activation to allow for proper function of scapular, scapulothoracic and UE control with self care, carrying, lifting, driving/computer work.      Home Exercise Program:    [x] (66421) Reviewed/Progressed HEP activities related to strengthening, flexibility, endurance, ROM of scapular, scapulothoracic and UE control with self care, reaching, carrying, lifting, house/yardwork, driving/computer work  [] (11375) Reviewed/Progressed HEP activities related to improving balance, coordination, kinesthetic sense, posture, motor skill, proprioception of scapular, scapulothoracic and UE control with self care, reaching, carrying, lifting, house/yardwork, driving/computer work      Manual Treatments:  PROM / STM / Oscillations-Mobs:  G-I, II, III, IV (PA's, Inf., Post.)  [x] (11741) Provided manual therapy to mobilize soft tissue/joints of cervical/CT, scapular GHJ and UE for the purpose of modulating pain, promoting relaxation,  increasing ROM, reducing/eliminating soft tissue swelling/inflammation/restriction, improving soft tissue extensibility and allowing for proper ROM for normal function with self care, reaching, carrying, lifting, house/yardwork, driving/computer work    Charges:  Timed Code Treatment Minutes: 40   Total Treatment Minutes: 55       [] EVAL (LOW) 73184 (typically 20 minutes face-to-face)  [] EVAL (MOD) 63181 (typically 30 minutes face-to-face)  [] EVAL (HIGH) 53520 (typically 45 minutes face-to-face)  [] RE-EVAL     [x] LB(03262) x     [] Dry needle 1 or 2 Muscles (19684)  [x] NMR (22393) x     [] Dry needle 3+ Muscles (42315)  [x] Manual (69131) x     [] Ultrasound (76732) x  [] TA (95878) x     [] Mech Traction (30041)  [] ES(attended) (97845)     [x] ES (un) (68064):   [] Vasopump (98887) [] Ionto (35306)   [] Other:    Approval Dates: 12/15/21 to 2/28/22  CPT Code Units Approved Units Used  Date Updated: 1/31   Eval  1    Ex 96 12   Man 96 11   NMR 96 8   TA 96    Estim 96 10     GOALS:  Patient stated goal: \"Be able to return to work\"  []? Progressing: []? Met: []? Not Met: []? Adjusted     Therapist goals for Patient:   Short Term Goals: To be achieved in: 2 weeks  1. Independent in HEP and progression per patient tolerance, in order to prevent re-injury. []? Progressing: []? Met: []? Not Met: []? Adjusted  2. Patient will have a decrease in painby 40% to facilitate improvement in movement, function, and ADLs as indicated by Functional Deficits. []?  Progressing: []? Met: []? Not Met: []? Adjusted     Long Term Goals: To be achieved in: at discharge  1. Disability index score of 30% or less for the UEFS to assist with reaching prior level of function. []? Progressing: []? Met: []? Not Met: []? Adjusted  2. Patient will demonstrate increased AROM to flex/abd = 160 and ER= 75 to allow for proper joint functioning as indicated by Functional Deficits. []? Progressing: []? Met: []? Not Met: []? Adjusted  3. Patient will demonstrate an increase in NM recruitment/activation and overall GH and scapular strength to 4/5 for proper functional mobility as indicated by patients Functional Deficits. []? Progressing: []? Met: []? Not Met: []? Adjusted  4. Patient will return to dressing independently without increased symptoms or restriction. []? Progressing: []? Met: []? Not Met: []? Adjusted  5. Patient will return to driving without increased symptoms or restriction. []? Progressing: []? Met: []? Not Met: []? Adjusted    ASSESSMENT:  Patient with fair tolerance to PT session today. Patient is making slow, steady gains with shoulder PROM. Overhead strength limited by [facility] joint popping and pain above 90 degrees. Able to reduce some of the popping with better scap stabilization. Constant cueing to engage scap stabilizers needed. Pt will benefit from continued RTC and scapular strengthening for ease with return to work. Treatment/Activity Tolerance:  [x] Patient tolerated treatment well [] Patient limited by fatique  [] Patient limited by pain  [] Patient limited by other medical complications  [] Other:     Overall Progression Towards Functional goals/ Treatment Progress Update:  [] Patient is progressing as expected towards functional goals listed. [] Progression is slowed due to complexities/Impairments listed. [] Progression has been slowed due to co-morbidities.   [x] Plan just implemented, too soon to assess goals progression <30days   [] Goals require adjustment due to lack of progress  [] Patient is not progressing as expected and requires additional follow up with physician  [] Other    Prognosis for POC: [x] Good [] Fair  [] Poor    Patient requires continued skilled intervention: [x] Yes  [] No      PLAN:Progress AROM/resistive work as able. Focus on scap stabilizers to decrease AC joint irritation. [x] Continue per plan of care [] Alter current plan (see comments)  [] Plan of care initiated [] Hold pending MD visit [] Discharge    Electronically signed by: Aleida Eastman PT , OMT-C,  [id number]      Note: If patient does not return for scheduled/recommended follow up visits, this note will serve as a discharge from care along with the most recent update on progress.

## 2022-02-02 ENCOUNTER — HOSPITAL ENCOUNTER (OUTPATIENT)
Dept: PHYSICAL THERAPY | Age: 42
Setting detail: THERAPIES SERIES
Discharge: HOME | End: 2022-02-02
Payer: COMMERCIAL

## 2022-02-02 NOTE — FLOWSHEET NOTE
[facility], [facility]  [address] [facility] [facility] [facility] [place], [facility]  Phone: [phone]   Fax:     [phone]    Physical Therapy  Cancellation/No-show Note  Patient Name:  Kristan Emmanuel  :  1980   Date:  2022  Cancelled visits to date: 4  No-shows to date: 0    Patient status for today's appointment patient:  [x]  Cancelled  []  Rescheduled appointment  []  No-show     Reason given by patient:  []  Patient ill   []  Conflicting appointment  []  No transportation    []  Conflict with work  []  No reason given  []  Other:     Comments:      Phone call information:   []  Phone call made today to patient at _ time at number provided:      []  Patient answered, conversation as follows:    []  Patient did not answer, message left as follows:  []  Phone call not made today    Electronically signed by:  Elodia Gustafson #[id number]

## 2022-02-07 ENCOUNTER — HOSPITAL ENCOUNTER (OUTPATIENT)
Dept: PHYSICAL THERAPY | Age: 42
Setting detail: THERAPIES SERIES
Discharge: HOME | End: 2022-02-07
Payer: COMMERCIAL

## 2022-02-07 NOTE — FLOWSHEET NOTE
[facility], [facility]  [address] [facility] [place], [facility]  Phone: [phone]   Fax:     [phone]    Physical Therapy  Cancellation/No-show Note  Patient Name:  Ike Cardona  :  1980   Date:  2022  Cancelled visits to date: 5  No-shows to date: 0    Patient status for today's appointment patient:  [x]  Cancelled  []  Rescheduled appointment  []  No-show     Reason given by patient:  []  Patient ill   []  Conflicting appointment  []  No transportation    []  Conflict with work  []  No reason given  [x]  Other: can't get out of the driveway    Comments:      Phone call information:   []  Phone call made today to patient at _ time at number provided:      []  Patient answered, conversation as follows:    []  Patient did not answer, message left as follows:  []  Phone call not made today    Electronically signed by:  Christa Mullins [place] , OMT-C,  [id number]

## 2022-02-10 ENCOUNTER — HOSPITAL ENCOUNTER (OUTPATIENT)
Dept: PHYSICAL THERAPY | Age: 42
Setting detail: THERAPIES SERIES
Discharge: HOME | End: 2022-02-10
Payer: COMMERCIAL

## 2022-02-10 PROCEDURE — 97112 NEUROMUSCULAR REEDUCATION: CPT | Performed by: CHIROPRACTOR

## 2022-02-10 PROCEDURE — 97140 MANUAL THERAPY 1/> REGIONS: CPT | Performed by: CHIROPRACTOR

## 2022-02-10 PROCEDURE — G0283 ELEC STIM OTHER THAN WOUND: HCPCS | Performed by: CHIROPRACTOR

## 2022-02-10 PROCEDURE — 97110 THERAPEUTIC EXERCISES: CPT | Performed by: CHIROPRACTOR

## 2022-02-10 NOTE — PROGRESS NOTES
[facility], [facility]  [address] [facility] [place], [facility]  Phone: [phone]   Fax:     [phone]        Physical Therapy Treatment Note/ Progress Report:       Date:  2/10/2022    Patient Name:  Claire Cook    :  1980  MRN: [id number]    Pertinent Medical History:asthma, depression    Medical/Treatment Diagnosis Information:  · Diagnosis: S/P R shoulder scope with SAD,DCE, RTC debridement, and labral repair  · Treatment Diagnosis: Decreased mobility, strength    Insurance/Certification information:  PT Insurance Information: [facility]  Physician Information:  Referring Practitioner: Marcos Olsen MD  Plan of care signed (Y/N): faxed 12/15    Date of Patient follow up with Physician:      Progress Report: [x]  Yes  []  No     Date Range for reporting period:  Beginnin/15/2021  Endin22    Progress report due (10 Rx/or 30 days whichever is less):     Recertification due (POC duration/ or 90 days whichever is less): 21    Visit # POC/Insurance Allowable Auth Needed    eval + 96 units 12/15-22 [x]Yes    []No     Functional Outcomes Measure:    Date Assessed: at eval  Test: UEFS  Score: 23/80 = 71% disability    Pain level:  1/10     History of Injury: Patient underwent L shoulder scope with SAD, DCE, RTC debridement, and labral repair/debridement on 21. Patient notes no complications with surgery. Currently wearing sling at all times when she is up and moving. Patient notes taking her sling off when she is relaxing at home. Currently getting assistance from family with ADLs like dressing and cooking. Patient has also been icing her shoulder as needed. SUBJECTIVE:  :  Shoulder is feeling better. Was sore after last session but calmed down the next day. : shoulder is doing well. No c/o pain currently. To PT without sling today.    1/3: shoulder is doing ok. Notes have some rubbing at [facility] joint. 1/6: had to take ibuprofen after last session but shoulder was not bad.    1/13: shoulder is coming along. Has been able to use arm more around the house. 1/17: notes she has been able to lift arm up a little higher when stretching without pain. Arm gets sore with increased activity but able to manage with ibuprofen and ice.    1/20: patient notes she attempted to return to work this week on Tuesday evening. Patient states she was doing well until she had to lift a pt who was not able to help. Since this patient states she has had an increase in discomfort and pressure in her shoulder since then. Did use ice on her shoulder once and took some advil which really didn't help. 1/27: Arm continues to be sore since working last week. Has tried taking ibuprofen, ice, and heat with no relief. Patient notes she cleaned her house yesterday and arm was really sore. Pt states her main c/o pain is located along the neck and AC joint. 1/31: Patient reports that her shoulder is slowly coming along. Patient reports 50-60% improvement overall. Notes she gets a decent amount of popping in her shoulder. Patient states she has returned to driving but still has difficulty reaching up for the steering wheel. Patient can now get dressed independently but has some difficulty depending on what type of shirt she is putting on.      OBJECTIVE:    Observation: Updated 1/31/22  · Palpation: mod TTP AC joint/UT  · Functional Mobility/Transfers: independent  · Posture: cueing needed to decrease UT activation with forward elevation   · Bandages/Dressings/Incisions: incisions healed with no signs of infection    Test measurements:    ROM:  Date      Shldr flexion    Shldr abd  Shldr IR         Shldr ER   A P A P A P A P   Eval 12/15  15  50  40  40   1/31/21 105 120 110 140  75  80     Strength:  Date Shoulder flexion Shoulder abduction Shoulder IR Shoulder  ER Bicep Eval 12/15 NT NT NT NT NT   1/31/21 3+/5 3+/5 4/5 3+/5 4/5   NT=not tested due to MD protocol    RESTRICTIONS/PRECAUTIONS: Pt s/p L shoulder scope with SAD, DCE, RTC debridement, and labral repair/debridement on 12/13/21. Exercises/Interventions:   Therapeutic Ex (88945)  Min: 20 Resistance/Reps Notes/Cues   Left SAD, DCE, RTC/labral debridement  12/13/21   NT  x2 mins No restrictions per MD, just pain guided progression   Pulley flexion 10x    UE ranger:  *flexion  *scaption   10x L  10x L Keep range below 90 to avoid AC joint irritation        Standing cane: ER  extension 10x with elbow by side  10x     Bicep curl 2# 2x10 L    Tband row             Extension             Triceps             IR             adduction Red 20x  Red 20x  Red 20x   Red 20x L  Red 20x L Cueing required to relax UT        Supine cane:  *ER  *chest press  *serratus punch 10x L          PT assist to maintain arm in 90` flexion   (painful)       SL ER        Flexion with UE ranger 10x L   add        Therapeutic Activity (58082) Min:                     NMR re-education (70100)  Min: 10     SL scap:  *retraction/protraction  *depression 10x with PT manual resist  10x AROM   Shldr isometrics sub-max flex, abd, ER in neutral 5x 5 sec holds , gentle   Stand scap retraction with arm supported on swiss ball on table Green ball 10x , hold 5 secs         Manual Intervention (60430) Min: 10     Shoulder PROM 4-way x5 mins - gentle    STM L UT/post RTC x5 mins         Modalities:  Min 15     IFC with CP x15 mins L shoulder Seated in recliner          Other Therapeutic Activities:  Pt was educated on PT POC, Diagnosis, Prognosis, pathomechanics as well as frequency and duration of scheduling future physical therapy appointments. Time was also taken on this day to answer all patient questions and participation in PT. Reviewed appointment policy in detail with patient and patient verbalized understanding.      Home Exercise Program: Patient instructed in the following for HEP:           Access Code: [email or web address]  Seated Shoulder Shrugs - 2 x daily - 7 x weekly - 2 sets - 10 reps  Seated Scapular Retraction - 2 x daily - 7 x weekly - 1 sets - 10 reps  Bicep curls - 2 x daily - 7 x weekly - 2 sets - 10 reps  Composite Wrist and Finger Flexion and Extension - 2 x daily - 7 x weekly - 2 sets - 10 reps  Seated Forearm Pronation Supination AROM - 2 x daily - 7 x weekly - 2 sets - 10 reps  Towel Roll Squeeze - 2 x daily - 7 x weekly - 2 sets - 10 reps  Seated Shoulder Flexion Towel Slide at Table Top - 2 x daily - 7 x weekly - 1 sets - 10 reps  Patient verbalized/demonstrated understanding and was issued written handout. Therapeutic Exercise and NMR EXR  [x] (91456) Provided verbal/tactile cueing for activities related to strengthening, flexibility, endurance, ROM  for improvements in scapular, scapulothoracic and UE control with self care, reaching, carrying, lifting, house/yardwork, driving/computer work.    [] (70980) Provided verbal/tactile cueing for activities related to improving balance, coordination, kinesthetic sense, posture, motor skill, proprioception  to assist with  scapular, scapulothoracic and UE control with self care, reaching, carrying, lifting, house/yardwork, driving/computer work. Therapeutic Activities:    [] (57760 or 79714) Provided verbal/tactile cueing for activities related to improving balance, coordination, kinesthetic sense, posture, motor skill, proprioception and motor activation to allow for proper function of scapular, scapulothoracic and UE control with self care, carrying, lifting, driving/computer work.      Home Exercise Program:    [x] (90272) Reviewed/Progressed HEP activities related to strengthening, flexibility, endurance, ROM of scapular, scapulothoracic and UE control with self care, reaching, carrying, lifting, house/yardwork, driving/computer work  [] (18314) Reviewed/Progressed HEP activities related to improving balance, coordination, kinesthetic sense, posture, motor skill, proprioception of scapular, scapulothoracic and UE control with self care, reaching, carrying, lifting, house/yardwork, driving/computer work      Manual Treatments:  PROM / STM / Oscillations-Mobs:  G-I, II, III, IV (PA's, Inf., Post.)  [x] (66458) Provided manual therapy to mobilize soft tissue/joints of cervical/CT, scapular GHJ and UE for the purpose of modulating pain, promoting relaxation,  increasing ROM, reducing/eliminating soft tissue swelling/inflammation/restriction, improving soft tissue extensibility and allowing for proper ROM for normal function with self care, reaching, carrying, lifting, house/yardwork, driving/computer work    Charges:  Timed Code Treatment Minutes: 40   Total Treatment Minutes: 55       [] EVAL (LOW) 13754 (typically 20 minutes face-to-face)  [] EVAL (MOD) 03637 (typically 30 minutes face-to-face)  [] EVAL (HIGH) 28703 (typically 45 minutes face-to-face)  [] RE-EVAL     [x] US(84575) x     [] Dry needle 1 or 2 Muscles (91187)  [x] NMR (47634) x     [] Dry needle 3+ Muscles (99136)  [x] Manual (91378) x     [] Ultrasound (22032) x  [] TA (59104) x     [] Mech Traction (86662)  [] ES(attended) (20092)     [x] ES (un) (03779):   [] Vasopump (98173) [] Ionto (31958)   [] Other:    Approval Dates: 12/15/21 to 2/28/22  CPT Code Units Approved Units Used  Date Updated: 2/10   Eval  1    Ex 96 13   Man 96 12   NMR 96 9   TA 96    Estim 96 11     GOALS:  Patient stated goal: \"Be able to return to work\"  []? Progressing: []? Met: []? Not Met: []? Adjusted     Therapist goals for Patient:   Short Term Goals: To be achieved in: 2 weeks  1. Independent in HEP and progression per patient tolerance, in order to prevent re-injury. []? Progressing: []? Met: []? Not Met: []? Adjusted  2.  Patient will have a decrease in painby 40% to facilitate improvement in movement, function, and ADLs as indicated by Functional Deficits. []? Progressing: []? Met: []? Not Met: []? Adjusted     Long Term Goals: To be achieved in: at discharge  1. Disability index score of 30% or less for the UEFS to assist with reaching prior level of function. []? Progressing: []? Met: []? Not Met: []? Adjusted  2. Patient will demonstrate increased AROM to flex/abd = 160 and ER= 75 to allow for proper joint functioning as indicated by Functional Deficits. []? Progressing: []? Met: []? Not Met: []? Adjusted  3. Patient will demonstrate an increase in NM recruitment/activation and overall GH and scapular strength to 4/5 for proper functional mobility as indicated by patients Functional Deficits. []? Progressing: []? Met: []? Not Met: []? Adjusted  4. Patient will return to dressing independently without increased symptoms or restriction. []? Progressing: []? Met: []? Not Met: []? Adjusted  5. Patient will return to driving without increased symptoms or restriction. []? Progressing: []? Met: []? Not Met: []? Adjusted    ASSESSMENT:  Patient with fair tolerance to PT session today. Patient is making slow, steady gains with shoulder PROM. Overhead strength limited by [facility] joint popping and pain above 90 degrees. Able to reduce some of the popping with better scap stabilization. Constant cueing to engage scap stabilizers needed. Pt will benefit from continued RTC and scapular strengthening for ease with return to work. Treatment/Activity Tolerance:  [x] Patient tolerated treatment well [] Patient limited by fatique  [] Patient limited by pain  [] Patient limited by other medical complications  [] Other:     Overall Progression Towards Functional goals/ Treatment Progress Update:  [] Patient is progressing as expected towards functional goals listed. [] Progression is slowed due to complexities/Impairments listed. [] Progression has been slowed due to co-morbidities.   [x] Plan just implemented, too soon to assess goals progression <30days

## 2022-02-14 ENCOUNTER — HOSPITAL ENCOUNTER (OUTPATIENT)
Dept: PHYSICAL THERAPY | Age: 42
Setting detail: THERAPIES SERIES
Discharge: HOME | End: 2022-02-14
Payer: COMMERCIAL

## 2022-02-14 NOTE — FLOWSHEET NOTE
[facility], [facility]  [address] [place], [facility]  Phone: [phone]   Fax:     [phone]    Physical Therapy  Cancellation/No-show Note  Patient Name:  Tiana Mercer  :  1980   Date:  2022  Cancelled visits to date: 5  No-shows to date: 1    Patient status for today's appointment patient:  []  Cancelled  []  Rescheduled appointment  [x]  No-show     Reason given by patient:  []  Patient ill   []  Conflicting appointment  []  No transportation    []  Conflict with work  []  No reason given  []  Other:    Comments:      Phone call information:   [x]  Phone call made today to patient at number provided:      []  Patient answered, conversation as follows:    [x]  Patient did not answer, message left as follows: next appt on  at 2:45 pm.  Will take out visits if not at next appointment.   []  Phone call not made today    Electronically signed by:  Bayron Joel , OMT-C,  [id number]

## 2022-02-17 ENCOUNTER — HOSPITAL ENCOUNTER (OUTPATIENT)
Dept: PHYSICAL THERAPY | Age: 42
Setting detail: THERAPIES SERIES
Discharge: HOME | End: 2022-02-17
Payer: COMMERCIAL

## 2022-02-17 PROCEDURE — 97110 THERAPEUTIC EXERCISES: CPT | Performed by: CHIROPRACTOR

## 2022-02-17 PROCEDURE — G0283 ELEC STIM OTHER THAN WOUND: HCPCS | Performed by: CHIROPRACTOR

## 2022-02-17 PROCEDURE — 97140 MANUAL THERAPY 1/> REGIONS: CPT | Performed by: CHIROPRACTOR

## 2022-02-17 PROCEDURE — 97112 NEUROMUSCULAR REEDUCATION: CPT | Performed by: CHIROPRACTOR

## 2022-02-17 NOTE — PROGRESS NOTES
[facility], [facility]  [address] [facility], [facility]  Phone: [phone]   Fax:     [phone]        Physical Therapy Treatment Note/ Progress Report:       Date:  2022    Patient Name:  Adrianna Pierce    :  1980  MRN: [id number]    Pertinent Medical History:asthma, depression    Medical/Treatment Diagnosis Information:  · Diagnosis: S/P R shoulder scope with SAD,DCE, RTC debridement, and labral repair  · Treatment Diagnosis: Decreased mobility, strength    Insurance/Certification information:  PT Insurance Information: [facility]  Physician Information:  Referring Practitioner: Radha Claros MD  Plan of care signed (Y/N): faxed 12/15    Date of Patient follow up with Physician:      Progress Report: [x]  Yes  []  No     Date Range for reporting period:  Beginnin/15/2021  Endin22    Progress report due (10 Rx/or 30 days whichever is less): 20    Recertification due (POC duration/ or 90 days whichever is less): 21    Visit # POC/Insurance Allowable Auth Needed    eval + 96 units 12/15-22 [x]Yes    []No     Functional Outcomes Measure:    Date Assessed: at eval  Test: UEFS  Score: 23/80 = 71% disability    Pain level:  0-1/10     History of Injury: Patient underwent L shoulder scope with SAD, DCE, RTC debridement, and labral repair/debridement on 21. Patient notes no complications with surgery. Currently wearing sling at all times when she is up and moving. Patient notes taking her sling off when she is relaxing at home. Currently getting assistance from family with ADLs like dressing and cooking. Patient has also been icing her shoulder as needed. SUBJECTIVE:  :  Shoulder is feeling better. Was sore after last session but calmed down the next day. : shoulder is doing well. No c/o pain currently. To PT without sling today. 1/3: shoulder is doing ok. Notes have some rubbing at [facility] joint. 1/6: had to take ibuprofen after last session but shoulder was not bad.    1/13: shoulder is coming along. Has been able to use arm more around the house. 1/17: notes she has been able to lift arm up a little higher when stretching without pain. Arm gets sore with increased activity but able to manage with ibuprofen and ice.    1/20: patient notes she attempted to return to work this week on Tuesday evening. Patient states she was doing well until she had to lift a pt who was not able to help. Since this patient states she has had an increase in discomfort and pressure in her shoulder since then. Did use ice on her shoulder once and took some advil which really didn't help. 1/27: Arm continues to be sore since working last week. Has tried taking ibuprofen, ice, and heat with no relief. Patient notes she cleaned her house yesterday and arm was really sore. Pt states her main c/o pain is located along the neck and AC joint. 1/31: Patient reports that her shoulder is slowly coming along. Patient reports 50-60% improvement overall. Notes she gets a decent amount of popping in her shoulder. Patient states she has returned to driving but still has difficulty reaching up for the steering wheel. Patient can now get dressed independently but has some difficulty depending on what type of shirt she is putting on.      OBJECTIVE:    Observation: Updated 1/31/22  · Palpation: mod TTP AC joint/UT  · Functional Mobility/Transfers: independent  · Posture: cueing needed to decrease UT activation with forward elevation   · Bandages/Dressings/Incisions: incisions healed with no signs of infection    Test measurements:    ROM:  Date      Shldr flexion    Shldr abd  Shldr IR         Shldr ER   A P A P A P A P   Eval 12/15  15  50  40  40   1/31/21 105 120 110 140  75  80     Strength:  Date Shoulder flexion Shoulder abduction Shoulder IR Shoulder  ER Bicep   Eval 12/15 NT NT NT NT NT   1/31/21 3+/5 3+/5 4/5 3+/5 4/5   NT=not tested due to MD protocol    RESTRICTIONS/PRECAUTIONS: Pt s/p L shoulder scope with SAD, DCE, RTC debridement, and labral repair/debridement on 12/13/21. Exercises/Interventions:   Therapeutic Ex (35489)  Min: 20 Resistance/Reps Notes/Cues   Left SAD, DCE, RTC/labral debridement  12/13/21   NT  x2 mins No restrictions per MD, just pain guided progression   Pulley flexion 10x    UE ranger:  *flexion  *scaption   10x L  10x L Keep range below 90 to avoid AC joint irritation        Standing cane: ER  extension 10x with elbow by side  10x     Bicep curl 2# 2x10 L    Tband row             Extension             Triceps             IR             adduction Red 20x  Red 20x  Red 20x   Red 20x L  Red 20x L Cueing required to relax UT        Supine cane:  *ER  *chest press  *serratus punch 10x L          PT assist to maintain arm in 90` flexion   (painful)       SL ER        Flexion with UE ranger 10x L   add        Therapeutic Activity (91445) Min:                     NMR re-education (14609)  Min: 10     SL scap:  *retraction/protraction  *depression 10x with PT manual resist  10x AROM   Shldr isometrics sub-max flex, abd, ER in neutral 5x 5 sec holds , gentle   Stand scap retraction with arm supported on swiss ball on table Green ball 10x , hold 5 secs         Manual Intervention (07313) Min: 10     Shoulder PROM 4-way x5 mins - gentle Pain in L Pec   STM L UT/post RTC x5 mins         Modalities:  Min 15     IFC with CP x15 mins L shoulder Seated in recliner          Other Therapeutic Activities:  Pt was educated on PT POC, Diagnosis, Prognosis, pathomechanics as well as frequency and duration of scheduling future physical therapy appointments. Time was also taken on this day to answer all patient questions and participation in PT. Reviewed appointment policy in detail with patient and patient verbalized understanding.      Home Exercise Program: Patient instructed in the following for HEP:           Access Code: [email or web address]  Seated Shoulder Shrugs - 2 x daily - 7 x weekly - 2 sets - 10 reps  Seated Scapular Retraction - 2 x daily - 7 x weekly - 1 sets - 10 reps  Bicep curls - 2 x daily - 7 x weekly - 2 sets - 10 reps  Composite Wrist and Finger Flexion and Extension - 2 x daily - 7 x weekly - 2 sets - 10 reps  Seated Forearm Pronation Supination AROM - 2 x daily - 7 x weekly - 2 sets - 10 reps  Towel Roll Squeeze - 2 x daily - 7 x weekly - 2 sets - 10 reps  Seated Shoulder Flexion Towel Slide at Table Top - 2 x daily - 7 x weekly - 1 sets - 10 reps  Patient verbalized/demonstrated understanding and was issued written handout. Therapeutic Exercise and NMR EXR  [x] (44275) Provided verbal/tactile cueing for activities related to strengthening, flexibility, endurance, ROM  for improvements in scapular, scapulothoracic and UE control with self care, reaching, carrying, lifting, house/yardwork, driving/computer work.    [] (38488) Provided verbal/tactile cueing for activities related to improving balance, coordination, kinesthetic sense, posture, motor skill, proprioception  to assist with  scapular, scapulothoracic and UE control with self care, reaching, carrying, lifting, house/yardwork, driving/computer work. Therapeutic Activities:    [] (54453 or 91603) Provided verbal/tactile cueing for activities related to improving balance, coordination, kinesthetic sense, posture, motor skill, proprioception and motor activation to allow for proper function of scapular, scapulothoracic and UE control with self care, carrying, lifting, driving/computer work.      Home Exercise Program:    [x] (24094) Reviewed/Progressed HEP activities related to strengthening, flexibility, endurance, ROM of scapular, scapulothoracic and UE control with self care, reaching, carrying, lifting, house/yardwork, driving/computer work  [] (29677) Reviewed/Progressed HEP activities related to improving balance, coordination, kinesthetic sense, posture, motor skill, proprioception of scapular, scapulothoracic and UE control with self care, reaching, carrying, lifting, house/yardwork, driving/computer work      Manual Treatments:  PROM / STM / Oscillations-Mobs:  G-I, II, III, IV (PA's, Inf., Post.)  [x] (19246) Provided manual therapy to mobilize soft tissue/joints of cervical/CT, scapular GHJ and UE for the purpose of modulating pain, promoting relaxation,  increasing ROM, reducing/eliminating soft tissue swelling/inflammation/restriction, improving soft tissue extensibility and allowing for proper ROM for normal function with self care, reaching, carrying, lifting, house/yardwork, driving/computer work    Charges:  Timed Code Treatment Minutes: 40   Total Treatment Minutes: 55       [] EVAL (LOW) 76095 (typically 20 minutes face-to-face)  [] EVAL (MOD) 41331 (typically 30 minutes face-to-face)  [] EVAL (HIGH) 10579 (typically 45 minutes face-to-face)  [] RE-EVAL     [x] ZF(82933) x     [] Dry needle 1 or 2 Muscles (46385)  [x] NMR (40130) x     [] Dry needle 3+ Muscles (55772)  [x] Manual (77038) x     [] Ultrasound (38649) x  [] TA (14333) x     [] Mech Traction (34436)  [] ES(attended) (07908)     [x] ES (un) (09203):   [] Vasopump (50368) [] Ionto (02099)   [] Other:    Approval Dates: 12/15/21 to 2/28/22  CPT Code Units Approved Units Used  Date Updated: 2/17   Eval  1    Ex 96 14   Man 96 13   NMR 96 10   TA 96    Estim 96 12     GOALS:  Patient stated goal: \"Be able to return to work\"  []? Progressing: []? Met: []? Not Met: []? Adjusted     Therapist goals for Patient:   Short Term Goals: To be achieved in: 2 weeks  1. Independent in HEP and progression per patient tolerance, in order to prevent re-injury. []? Progressing: []? Met: []? Not Met: []? Adjusted  2.  Patient will have a decrease in painby 40% to facilitate improvement in movement, function, and ADLs as indicated by Functional Deficits. []? Progressing: []? Met: []? Not Met: []? Adjusted     Long Term Goals: To be achieved in: at discharge  1. Disability index score of 30% or less for the UEFS to assist with reaching prior level of function. []? Progressing: []? Met: []? Not Met: []? Adjusted  2. Patient will demonstrate increased AROM to flex/abd = 160 and ER= 75 to allow for proper joint functioning as indicated by Functional Deficits. []? Progressing: []? Met: []? Not Met: []? Adjusted  3. Patient will demonstrate an increase in NM recruitment/activation and overall GH and scapular strength to 4/5 for proper functional mobility as indicated by patients Functional Deficits. []? Progressing: []? Met: []? Not Met: []? Adjusted  4. Patient will return to dressing independently without increased symptoms or restriction. []? Progressing: []? Met: []? Not Met: []? Adjusted  5. Patient will return to driving without increased symptoms or restriction. []? Progressing: []? Met: []? Not Met: []? Adjusted    ASSESSMENT:  Patient with fair tolerance to PT session today. Patient is making slow, steady gains with shoulder PROM. Overhead strength limited by [facility] joint popping and pain above 90 degrees. Able to reduce some of the popping with better scap stabilization. Constant cueing to engage scap stabilizers needed. Pt will benefit from continued RTC and scapular strengthening for ease with return to work. Treatment/Activity Tolerance:  [x] Patient tolerated treatment well [] Patient limited by fatique  [] Patient limited by pain  [] Patient limited by other medical complications  [] Other:     Overall Progression Towards Functional goals/ Treatment Progress Update:  [] Patient is progressing as expected towards functional goals listed. [] Progression is slowed due to complexities/Impairments listed. [] Progression has been slowed due to co-morbidities.   [x] Plan just implemented, too soon to assess goals progression <30days   [] Goals require adjustment due to lack of progress  [] Patient is not progressing as expected and requires additional follow up with physician  [] Other    Prognosis for POC: [x] Good [] Fair  [] Poor    Patient requires continued skilled intervention: [x] Yes  [] No      PLAN:Progress AROM/resistive work as able. Focus on scap stabilizers to decrease AC joint irritation. [x] Continue per plan of care [] Alter current plan (see comments)  [] Plan of care initiated [] Hold pending MD visit [] Discharge    Electronically signed by: Cl Lynch [id number]      Note: If patient does not return for scheduled/recommended follow up visits, this note will serve as a discharge from care along with the most recent update on progress.

## 2022-02-21 ENCOUNTER — HOSPITAL ENCOUNTER (OUTPATIENT)
Dept: PHYSICAL THERAPY | Age: 42
Setting detail: THERAPIES SERIES
Discharge: HOME | End: 2022-02-21
Payer: COMMERCIAL

## 2022-02-21 PROCEDURE — 97110 THERAPEUTIC EXERCISES: CPT | Performed by: CHIROPRACTOR

## 2022-02-21 PROCEDURE — G0283 ELEC STIM OTHER THAN WOUND: HCPCS | Performed by: CHIROPRACTOR

## 2022-02-21 PROCEDURE — 97140 MANUAL THERAPY 1/> REGIONS: CPT | Performed by: CHIROPRACTOR

## 2022-02-21 PROCEDURE — 97112 NEUROMUSCULAR REEDUCATION: CPT | Performed by: CHIROPRACTOR

## 2022-02-21 NOTE — PROGRESS NOTES
[facility], [facility]  [address] [facility] [place], [facility]  Phone: [phone]   Fax:     [phone]        Physical Therapy Treatment Note/ Progress Report:       Date:  2022    Patient Name:  Kamla Garay    :  1980  MRN: [id number]    Pertinent Medical History:asthma, depression    Medical/Treatment Diagnosis Information:  · Diagnosis: S/P R shoulder scope with SAD,DCE, RTC debridement, and labral repair  · Treatment Diagnosis: Decreased mobility, strength    Insurance/Certification information:  PT Insurance Information: [facility]  Physician Information:  Referring Practitioner: Clemencia Nance MD  Plan of care signed (Y/N): faxed 12/15    Date of Patient follow up with Physician:      Progress Report: [x]  Yes  []  No     Date Range for reporting period:  Beginnin/15/2021  Endin22    Progress report due (10 Rx/or 30 days whichever is less):     Recertification due (POC duration/ or 90 days whichever is less): 21    Visit # POC/Insurance Allowable Auth Needed    eval + 96 units 12/15-22 [x]Yes    []No     Functional Outcomes Measure:    Date Assessed: at eval  Test: UEFS  Score: 23/80 = 71% disability    Pain level:  1/10     History of Injury: Patient underwent L shoulder scope with SAD, DCE, RTC debridement, and labral repair/debridement on 21. Patient notes no complications with surgery. Currently wearing sling at all times when she is up and moving. Patient notes taking her sling off when she is relaxing at home. Currently getting assistance from family with ADLs like dressing and cooking. Patient has also been icing her shoulder as needed. SUBJECTIVE:  :  Shoulder is feeling better. Was sore after last session but calmed down the next day. : shoulder is doing well. No c/o pain currently. To PT without sling today.    1/3: shoulder is doing ok. Notes have some rubbing at [facility] joint. 1/6: had to take ibuprofen after last session but shoulder was not bad.    1/13: shoulder is coming along. Has been able to use arm more around the house. 1/17: notes she has been able to lift arm up a little higher when stretching without pain. Arm gets sore with increased activity but able to manage with ibuprofen and ice.    1/20: patient notes she attempted to return to work this week on Tuesday evening. Patient states she was doing well until she had to lift a pt who was not able to help. Since this patient states she has had an increase in discomfort and pressure in her shoulder since then. Did use ice on her shoulder once and took some advil which really didn't help. 1/27: Arm continues to be sore since working last week. Has tried taking ibuprofen, ice, and heat with no relief. Patient notes she cleaned her house yesterday and arm was really sore. Pt states her main c/o pain is located along the neck and AC joint. 1/31: Patient reports that her shoulder is slowly coming along. Patient reports 50-60% improvement overall. Notes she gets a decent amount of popping in her shoulder. Patient states she has returned to driving but still has difficulty reaching up for the steering wheel.   Patient can now get dressed independently but has some difficulty depending on what type of shirt she is putting on.   2/21/22 - States that she is still having difficulty reaching up to the steering wheel    OBJECTIVE:    Observation: Updated 1/31/22  · Palpation: mod TTP AC joint/UT  · Functional Mobility/Transfers: independent  · Posture: cueing needed to decrease UT activation with forward elevation   · Bandages/Dressings/Incisions: incisions healed with no signs of infection    Test measurements:    ROM:  Date      Shldr flexion    Shldr abd  Shldr IR         Shldr ER   A P A P A P A P   Eval 12/15  15  50  40  40   1/31/21 105 120 110 140  75  80 Strength:  Date Shoulder flexion Shoulder abduction Shoulder IR Shoulder  ER Bicep   Eval 12/15 NT NT NT NT NT   1/31/21 3+/5 3+/5 4/5 3+/5 4/5   NT=not tested due to MD protocol    RESTRICTIONS/PRECAUTIONS: Pt s/p L shoulder scope with SAD, DCE, RTC debridement, and labral repair/debridement on 12/13/21. Exercises/Interventions:   Therapeutic Ex (82819)  Min: 20 Resistance/Reps Notes/Cues   Left SAD, DCE, RTC/labral debridement  12/13/21   NT  x2 mins No restrictions per MD, just pain guided progression   Pulley flexion 10x    UE ranger:  *flexion  *scaption   10x L  10x L Keep range below 90 to avoid AC joint irritation        Standing cane: ER  extension 10x with elbow by side  10x     Bicep curl 2# 2x10 L    Tband row             Extension             Triceps             IR             adduction Red 20x  Red 20x  Red 20x   Red 20x L  Red 20x L Cueing required to relax UT        Supine cane:  *ER  *chest press  *serratus punch 10x L          PT assist to maintain arm in 90` flexion   (painful)       SL ER        Flexion with UE ranger 10x L   add        Therapeutic Activity (31492) Min:                     NMR re-education (74607)  Min: 10     SL scap:  *retraction/protraction  *depression 10x with PT manual resist  10x AROM   Shldr isometrics sub-max flex, abd, ER in neutral 5x 5 sec holds , gentle   Stand scap retraction with arm supported on swiss ball on table Green ball 10x , hold 5 secs         Manual Intervention (64735) Min: 10     Shoulder PROM 4-way x5 mins - gentle Pain in L Pec   STM L UT/post RTC x5 mins         Modalities:  Min 15     IFC with CP x15 mins L shoulder Seated in recliner          Other Therapeutic Activities:  Pt was educated on PT POC, Diagnosis, Prognosis, pathomechanics as well as frequency and duration of scheduling future physical therapy appointments. Time was also taken on this day to answer all patient questions and participation in PT.  Reviewed appointment policy in detail with patient and patient verbalized understanding. Home Exercise Program: Patient instructed in the following for HEP:           Access Code: [email or web address]  Seated Shoulder Shrugs - 2 x daily - 7 x weekly - 2 sets - 10 reps  Seated Scapular Retraction - 2 x daily - 7 x weekly - 1 sets - 10 reps  Bicep curls - 2 x daily - 7 x weekly - 2 sets - 10 reps  Composite Wrist and Finger Flexion and Extension - 2 x daily - 7 x weekly - 2 sets - 10 reps  Seated Forearm Pronation Supination AROM - 2 x daily - 7 x weekly - 2 sets - 10 reps  Towel Roll Squeeze - 2 x daily - 7 x weekly - 2 sets - 10 reps  Seated Shoulder Flexion Towel Slide at Table Top - 2 x daily - 7 x weekly - 1 sets - 10 reps  Patient verbalized/demonstrated understanding and was issued written handout. Therapeutic Exercise and NMR EXR  [x] (58651) Provided verbal/tactile cueing for activities related to strengthening, flexibility, endurance, ROM  for improvements in scapular, scapulothoracic and UE control with self care, reaching, carrying, lifting, house/yardwork, driving/computer work.    [] (36996) Provided verbal/tactile cueing for activities related to improving balance, coordination, kinesthetic sense, posture, motor skill, proprioception  to assist with  scapular, scapulothoracic and UE control with self care, reaching, carrying, lifting, house/yardwork, driving/computer work. Therapeutic Activities:    [] (77894 or 57965) Provided verbal/tactile cueing for activities related to improving balance, coordination, kinesthetic sense, posture, motor skill, proprioception and motor activation to allow for proper function of scapular, scapulothoracic and UE control with self care, carrying, lifting, driving/computer work.      Home Exercise Program:    [x] (25781) Reviewed/Progressed HEP activities related to strengthening, flexibility, endurance, ROM of scapular, scapulothoracic and UE control with self care, reaching, carrying, lifting, house/yardwork, driving/computer work  [] (11894) Reviewed/Progressed HEP activities related to improving balance, coordination, kinesthetic sense, posture, motor skill, proprioception of scapular, scapulothoracic and UE control with self care, reaching, carrying, lifting, house/yardwork, driving/computer work      Manual Treatments:  PROM / STM / Oscillations-Mobs:  G-I, II, III, IV (PA's, Inf., Post.)  [x] (36441) Provided manual therapy to mobilize soft tissue/joints of cervical/CT, scapular GHJ and UE for the purpose of modulating pain, promoting relaxation,  increasing ROM, reducing/eliminating soft tissue swelling/inflammation/restriction, improving soft tissue extensibility and allowing for proper ROM for normal function with self care, reaching, carrying, lifting, house/yardwork, driving/computer work    Charges:  Timed Code Treatment Minutes: 40   Total Treatment Minutes: 55       [] EVAL (LOW) 19366 (typically 20 minutes face-to-face)  [] EVAL (MOD) 85200 (typically 30 minutes face-to-face)  [] EVAL (HIGH) 42948 (typically 45 minutes face-to-face)  [] RE-EVAL     [x] PK(75984) x     [] Dry needle 1 or 2 Muscles (53422)  [x] NMR (16723) x     [] Dry needle 3+ Muscles (72635)  [x] Manual (87502) x     [] Ultrasound (82879) x  [] TA (97153) x     [] Mech Traction (95843)  [] ES(attended) (34285)     [x] ES (un) (59014):   [] Vasopump (99024) [] Ionto (44425)   [] Other:    Approval Dates: 12/15/21 to 2/28/22  CPT Code Units Approved Units Used  Date Updated: 2/17   Eval  1    Ex 96 14   Man 96 13   NMR 96 10   TA 96    Estim 96 12     GOALS:  Patient stated goal: \"Be able to return to work\"  []? Progressing: []? Met: []? Not Met: []? Adjusted     Therapist goals for Patient:   Short Term Goals: To be achieved in: 2 weeks  1. Independent in HEP and progression per patient tolerance, in order to prevent re-injury. []? Progressing: []? Met: []? Not Met: []? Adjusted  2.  Patient will have a decrease in painby 40% to facilitate improvement in movement, function, and ADLs as indicated by Functional Deficits. []? Progressing: []? Met: []? Not Met: []? Adjusted     Long Term Goals: To be achieved in: at discharge  1. Disability index score of 30% or less for the UEFS to assist with reaching prior level of function. []? Progressing: []? Met: []? Not Met: []? Adjusted  2. Patient will demonstrate increased AROM to flex/abd = 160 and ER= 75 to allow for proper joint functioning as indicated by Functional Deficits. []? Progressing: []? Met: []? Not Met: []? Adjusted  3. Patient will demonstrate an increase in NM recruitment/activation and overall GH and scapular strength to 4/5 for proper functional mobility as indicated by patients Functional Deficits. []? Progressing: []? Met: []? Not Met: []? Adjusted  4. Patient will return to dressing independently without increased symptoms or restriction. []? Progressing: []? Met: []? Not Met: []? Adjusted  5. Patient will return to driving without increased symptoms or restriction. []? Progressing: []? Met: []? Not Met: []? Adjusted    ASSESSMENT:  Feeling slightly better, but still struggling with functional gains. Treatment/Activity Tolerance:  [x] Patient tolerated treatment well [] Patient limited by fatique  [] Patient limited by pain  [] Patient limited by other medical complications  [] Other:     Overall Progression Towards Functional goals/ Treatment Progress Update:  [] Patient is progressing as expected towards functional goals listed. [] Progression is slowed due to complexities/Impairments listed. [] Progression has been slowed due to co-morbidities.   [x] Plan just implemented, too soon to assess goals progression <30days   [] Goals require adjustment due to lack of progress  [] Patient is not progressing as expected and requires additional follow up with physician  [] Other    Prognosis for POC: [x] Good [] Fair  [] Poor    Patient requires continued skilled intervention: [x] Yes  [] No      PLAN:Progress AROM/resistive work as able. Focus on scap stabilizers to decrease AC joint irritation. [x] Continue per plan of care [] Alter current plan (see comments)  [] Plan of care initiated [] Hold pending MD visit [] Discharge    Electronically signed by: EDUARDO Ackerman#[id number]      Note: If patient does not return for scheduled/recommended follow up visits, this note will serve as a discharge from care along with the most recent update on progress.

## 2022-02-24 ENCOUNTER — HOSPITAL ENCOUNTER (OUTPATIENT)
Dept: PHYSICAL THERAPY | Age: 42
Setting detail: THERAPIES SERIES
Discharge: HOME | End: 2022-02-24
Payer: COMMERCIAL

## 2022-02-24 NOTE — FLOWSHEET NOTE
[facility] [place]  [address] [facility]  Phone: [phone]   Fax:     [phone]    Physical Therapy  Cancellation/No-show Note  Patient Name:  Glenn Funez  :  1980   Date:  2022  Cancelled visits to date: 6  No-shows to date: 1    Patient status for today's appointment patient:  [x]  Cancelled  []  Rescheduled appointment  []  No-show     Reason given by patient:  []  Patient ill   []  Conflicting appointment  []  No transportation    []  Conflict with work  []  No reason given  [x]  Other:    Comments: pt did not sleep last night     Phone call information:   []  Phone call made today to patient at number provided:      []  Patient answered, conversation as follows:    []  Patient did not answer, message left as follows:   []  Phone call not made today    Electronically signed by:  Merry Roca [place] , OMT-C,  [id number]

## 2022-02-28 ENCOUNTER — HOSPITAL ENCOUNTER (OUTPATIENT)
Dept: PHYSICAL THERAPY | Age: 42
Setting detail: THERAPIES SERIES
Discharge: HOME | End: 2022-02-28
Payer: COMMERCIAL

## 2022-02-28 PROCEDURE — 97112 NEUROMUSCULAR REEDUCATION: CPT

## 2022-02-28 PROCEDURE — G0283 ELEC STIM OTHER THAN WOUND: HCPCS

## 2022-02-28 PROCEDURE — 97110 THERAPEUTIC EXERCISES: CPT

## 2022-02-28 NOTE — PLAN OF CARE
Report:       Date:  2022    Patient Name:  Chioma Stovall    :  1980  MRN: [id number]    Pertinent Medical History:asthma, depression    Medical/Treatment Diagnosis Information:  · Diagnosis: S/P R shoulder scope with SAD,DCE, RTC debridement, and labral repair  · Treatment Diagnosis: Decreased mobility, strength    Insurance/Certification information:  [facility] Information[facility]  Physician Information:  Referring Practitioner: Evangelina Lindsey MD  Plan of care signed (Y/N): faxed 12/15    Date of Patient follow up with Physician:      Progress Report: [x]  Yes  []  No     Date Range for reporting period:  Beginnin22  Endin22    Progress report due (10 Rx/or 30 days whichever is less):     Recertification due (POC duration/ or 90 days whichever is less): 3/28/22    Visit # POC/Insurance Allowable Auth Needed   caresource    eval + 96 units 12/15-22 [x]Yes    []No     Functional Outcomes Measure:    Date Assessed: 22  Test: UEFS  Score: 27/80 =11% disability    Pain level:  0/10     History of Injury: Patient underwent L shoulder scope with SAD, DCE, RTC debridement, and labral repair/debridement on 21. Patient notes no complications with surgery. Currently wearing sling at all times when she is up and moving. Patient notes taking her sling off when she is relaxing at home. Currently getting assistance from family with ADLs like dressing and cooking. Patient has also been icing her shoulder as needed. SUBJECTIVE:  :  Shoulder is feeling better. Was sore after last session but calmed down the next day. : shoulder is doing well. No c/o pain currently. To PT without sling today. 1/3: shoulder is doing ok. Notes have some rubbing at [facility] joint. : had to take ibuprofen after last session but shoulder was not bad.    : shoulder is coming along. Has been able to use arm more around the house.    : notes she has been able to lift arm up a little higher when stretching without pain. Arm gets sore with increased activity but able to manage with ibuprofen and ice.    1/20: patient notes she attempted to return to work this week on Tuesday evening. Patient states she was doing well until she had to lift a pt who was not able to help. Since this patient states she has had an increase in discomfort and pressure in her shoulder since then. Did use ice on her shoulder once and took some advil which really didn't help. 1/27: Arm continues to be sore since working last week. Has tried taking ibuprofen, ice, and heat with no relief. Patient notes she cleaned her house yesterday and arm was really sore. Pt states her main c/o pain is located along the neck and AC joint. 1/31: Patient reports that her shoulder is slowly coming along. Patient reports 50-60% improvement overall. Notes she gets a decent amount of popping in her shoulder. Patient states she has returned to driving but still has difficulty reaching up for the steering wheel. Patient can now get dressed independently but has some difficulty depending on what type of shirt she is putting on.   2/21/22 - States that she is still having difficulty reaching up to the steering wheel  2/28: Patient notes she can reach up overhead pretty good now. Still getting pain with reaching back behind her. Patient states 80% improvement overall. Pt states she has not returned to work as of yet. Still having some complaints with reaching up to turn steering wheel. Able to dress independently without complaints.      OBJECTIVE:    Observation: Updated 2/28/22  · Palpation: slight TTP ACT joint  · Functional Mobility/Transfers: independent  · Posture: cueing needed to decrease UT activation with forward elevation   · Bandages/Dressings/Incisions: incisions healed with no signs of infection    Test measurements:    ROM:  Date      Shldr flexion    Shldr abd  Shldr IR         [employer] ER   A P A P A P A P   Eval 12/15  15  50  40  40   1/31/21 105 120 110 140  75  80   2/28/22 150 150 145 140  75  85     Strength:  Date Shoulder flexion Shoulder abduction Shoulder IR Shoulder  ER Bicep   Eval 12/15 NT NT NT NT NT   1/31/21 3+/5 3+/5 4/5 3+/5 4/5   2/28/22 4-/5 4-/5 4+/5 4/5 5/5   NT=not tested due to MD protocol    RESTRICTIONS/PRECAUTIONS: Pt s/p L shoulder scope with SAD, DCE, RTC debridement, and labral repair/debridement on 12/13/21. Exercises/Interventions:   Therapeutic Ex (42968)  Min: 25 Resistance/Reps Notes/Cues   Left SAD, DCE, RTC/labral debridement  12/13/21   NT  x2 mins No restrictions per MD, just pain guided progression   Pulley flexion             IR: 20x  10x    Standing PREs  *flexion  *scaption   1# 10x L  1# 10x L Keep range below 90 to avoid AC joint irritation   Wall pushups Add         Bicep curl 3# 2x10 L    Tband row             Extension             Triceps             IR             adduction Red 20x  Red 20x  Red 20x   Red 20x L  Red 20x L Cueing required to relax UT            Supine 90 degrees flexion : small circles cw/ccw 1# 10x each L   SL ER 1# 2x10 L        Therapeutic Activity (75344) Min:                     NMR re-education (74060)  Min: 10     Prone row             ext 1# 2x10 L  1# 2x10 L   Stand scap retraction with arm supported on swiss ball on table Green ball 10x , hold 5 secs With cueing   Circles on door with small cw/cww add         Manual Intervention (40275) Min: 5     Shoulder PROM 4-way x5 mins - gentle Pain in L Pec   STM L UT/post RTC          Modalities:  Min 15     IFC with CP x15 mins L shoulder Seated in recliner          Other Therapeutic Activities:  Pt was educated on PT POC, Diagnosis, Prognosis, pathomechanics as well as frequency and duration of scheduling future physical therapy appointments. Time was also taken on this day to answer all patient questions and participation in PT.  Reviewed appointment policy in detail with patient and patient verbalized understanding. Home Exercise Program: Patient instructed in the following for HEP:           Access Code: [email or web address]  Seated Shoulder Shrugs - 2 x daily - 7 x weekly - 2 sets - 10 reps  Seated Scapular Retraction - 2 x daily - 7 x weekly - 1 sets - 10 reps  Bicep curls - 2 x daily - 7 x weekly - 2 sets - 10 reps  Composite Wrist and Finger Flexion and Extension - 2 x daily - 7 x weekly - 2 sets - 10 reps  Seated Forearm Pronation Supination AROM - 2 x daily - 7 x weekly - 2 sets - 10 reps  Towel Roll Squeeze - 2 x daily - 7 x weekly - 2 sets - 10 reps  Seated Shoulder Flexion Towel Slide at Table Top - 2 x daily - 7 x weekly - 1 sets - 10 reps  Patient verbalized/demonstrated understanding and was issued written handout. 2/28: Updated HEP to include; Access Code: [email or web address]  URL: [email or web address]  Date: 02/28/2022  Prepared by: Anita Vargas    Exercises  Tband rows - 1 x daily - 7 x weekly - 3 sets - 10 reps - lime green band color  Tband shldr extension - 1 x daily - 7 x weekly - 3 sets - 10 reps - lime green band color  Tband shldr adduction - 1 x daily - 7 x weekly - 3 sets - 10 reps - lime green band color  Tband Shldr IR - 1 x daily - 7 x weekly - 3 sets - 10 reps - lime green band color  Tband shldr ER - 1 x daily - 7 x weekly - 3 sets - 10 reps - lime green band color  Tband triceps - 1 x daily - 7 x weekly - 3 sets - 10 reps - lime green band color  Bicep curls - 1 x daily - 7 x weekly - 2 sets - 10 reps - 3# weight  Standing Shoulder Flexion to 90 Degrees with Dumbbells - 1 x daily - 7 x weekly - 2 sets - 10 reps - 1# weight  Scaption with Dumbbells - 1 x daily - 7 x weekly - 2 sets - 10 reps - 1# weight  Standing Shoulder Internal Rotation Stretch with Towel - 1 x daily - 7 x weekly - 1 sets - 10 reps - 3 secs hold  Prone Shoulder Row - 1 x daily - 7 x weekly - 2 sets - 10 reps - 1# weight  Prone Shoulder Extension - Single Arm - 1 x daily - 7 x weekly - 2 sets - 10 reps - 1# weight  SL shldr ER - 1 x daily - 7 x weekly - 2 sets - 10 reps - 1# weight  Patient verbalized/demonstrated understanding and was issued written handout. Therapeutic Exercise and NMR EXR  [x] (25806) Provided verbal/tactile cueing for activities related to strengthening, flexibility, endurance, ROM  for improvements in scapular, scapulothoracic and UE control with self care, reaching, carrying, lifting, house/yardwork, driving/computer work.    [] (52513) Provided verbal/tactile cueing for activities related to improving balance, coordination, kinesthetic sense, posture, motor skill, proprioception  to assist with  scapular, scapulothoracic and UE control with self care, reaching, carrying, lifting, house/yardwork, driving/computer work. Therapeutic Activities:    [] (17441 or 91015) Provided verbal/tactile cueing for activities related to improving balance, coordination, kinesthetic sense, posture, motor skill, proprioception and motor activation to allow for proper function of scapular, scapulothoracic and UE control with self care, carrying, lifting, driving/computer work.      Home Exercise Program:    [x] (78723) Reviewed/Progressed HEP activities related to strengthening, flexibility, endurance, ROM of scapular, scapulothoracic and UE control with self care, reaching, carrying, lifting, house/yardwork, driving/computer work  [] (87974) Reviewed/Progressed HEP activities related to improving balance, coordination, kinesthetic sense, posture, motor skill, proprioception of scapular, scapulothoracic and UE control with self care, reaching, carrying, lifting, house/yardwork, driving/computer work      Manual Treatments:  PROM / STM / Oscillations-Mobs:  G-I, II, III, IV (PA's, Inf., Post.)  [x] (53270) Provided manual therapy to mobilize soft tissue/joints of cervical/CT, scapular GHJ and UE for the purpose of modulating pain, promoting relaxation,  increasing ROM, reducing/eliminating soft tissue swelling/inflammation/restriction, improving soft tissue extensibility and allowing for proper ROM for normal function with self care, reaching, carrying, lifting, house/yardwork, driving/computer work    Charges:  Timed Code Treatment Minutes: 40   Total Treatment Minutes: 55       [] EVAL (LOW) 85696 (typically 20 minutes face-to-face)  [] EVAL (MOD) 44060 (typically 30 minutes face-to-face)  [] EVAL (HIGH) 60605 (typically 45 minutes face-to-face)  [] RE-EVAL     [x] DW(83304) x2     [] Dry needle 1 or 2 Muscles (13467)  [x] NMR (82207) x     [] Dry needle 3+ Muscles (51787)  [] Manual (66041) x     [] Ultrasound (56876) x  [] TA (18190) x     [] Mech Traction (84815)  [] ES(attended) (92587)     [x] ES (un) (66019):   [] Vasopump (25870) [] Ionto (96256)   [] Other:    Approval Dates: 12/15/21 to 2/28/22  CPT Code Units Approved Units Used  Date Updated: 2/28   All codes 96 54     GOALS:  Patient stated goal: \"Be able to return to work\"  [x]? Progressing: []? Met: []? Not Met: []? Adjusted     Therapist goals for Patient:   Short Term Goals: To be achieved in: 2 weeks  1. Independent in HEP and progression per patient tolerance, in order to prevent re-injury. []? Progressing: [x]? Met: []? Not Met: []? Adjusted  2. Patient will have a decrease in painby 40% to facilitate improvement in movement, function, and ADLs as indicated by Functional Deficits. []? Progressing: [x]? Met: []? Not Met: []? Adjusted     Long Term Goals: To be achieved in: at discharge  1. Disability index score of 30% or less for the UEFS to assist with reaching prior level of function. []? Progressing: []? Met: []? Not Met: []? Adjusted  2. Patient will demonstrate increased AROM to flex/abd = 160 and ER= 75 to allow for proper joint functioning as indicated by Functional Deficits. [x]? Progressing: []? Met: []? Not Met: []? Adjusted  3.  Patient will demonstrate an increase in NM recruitment/activation and overall GH and by: Melissa Kan PT , OMT-C,  [id number]        Note: If patient does not return for scheduled/recommended follow up visits, this note will serve as a discharge from care along with the most recent update on progress.

## 2022-08-25 ENCOUNTER — HOSPITAL ENCOUNTER (EMERGENCY)
Age: 42
Discharge: HOME | End: 2022-08-25
Payer: COMMERCIAL

## 2022-08-25 VITALS
BODY MASS INDEX: 37.16 KG/M2 | TEMPERATURE: 98 F | HEART RATE: 89 BPM | WEIGHT: 236.77 LBS | DIASTOLIC BLOOD PRESSURE: 72 MMHG | HEIGHT: 67 IN | SYSTOLIC BLOOD PRESSURE: 143 MMHG | RESPIRATION RATE: 19 BRPM | OXYGEN SATURATION: 99 %

## 2022-08-25 DIAGNOSIS — L73.2 HIDRADENITIS SUPPURATIVA: ICD-10-CM

## 2022-08-25 DIAGNOSIS — L02.91 ABSCESS: Primary | ICD-10-CM

## 2022-08-25 PROCEDURE — 96372 THER/PROPH/DIAG INJ SC/IM: CPT

## 2022-08-25 PROCEDURE — 6360000002 HC RX W HCPCS: Performed by: PHYSICIAN ASSISTANT

## 2022-08-25 PROCEDURE — 99284 EMERGENCY DEPT VISIT MOD MDM: CPT

## 2022-08-25 PROCEDURE — 6370000000 HC RX 637 (ALT 250 FOR IP): Performed by: PHYSICIAN ASSISTANT

## 2022-08-25 RX ORDER — BUPROPION HYDROCHLORIDE 150 MG/1
TABLET ORAL
COMMUNITY
Start: 2022-08-22

## 2022-08-25 RX ORDER — KETOROLAC TROMETHAMINE 30 MG/ML
30 INJECTION, SOLUTION INTRAMUSCULAR; INTRAVENOUS ONCE
Status: COMPLETED | OUTPATIENT
Start: 2022-08-25 | End: 2022-08-25

## 2022-08-25 RX ORDER — CLINDAMYCIN HYDROCHLORIDE 150 MG/1
300 CAPSULE ORAL ONCE
Status: COMPLETED | OUTPATIENT
Start: 2022-08-25 | End: 2022-08-25

## 2022-08-25 RX ORDER — CLINDAMYCIN PHOSPHATE 10 UG/ML
LOTION TOPICAL
COMMUNITY
Start: 2022-07-28

## 2022-08-25 RX ORDER — IBUPROFEN 600 MG/1
600 TABLET ORAL EVERY 8 HOURS PRN
Qty: 21 TABLET | Refills: 0 | Status: SHIPPED | OUTPATIENT
Start: 2022-08-25 | End: 2022-09-01

## 2022-08-25 RX ORDER — CLINDAMYCIN HYDROCHLORIDE 300 MG/1
300 CAPSULE ORAL 3 TIMES DAILY
Qty: 29 CAPSULE | Refills: 0 | Status: SHIPPED | OUTPATIENT
Start: 2022-08-25 | End: 2022-09-04

## 2022-08-25 RX ORDER — MELOXICAM 15 MG/1
TABLET ORAL
COMMUNITY
Start: 2022-08-01 | End: 2022-08-25

## 2022-08-25 RX ORDER — DOXYCYCLINE HYCLATE 200 MG/1
200 TABLET, DELAYED RELEASE ORAL DAILY
COMMUNITY

## 2022-08-25 RX ADMIN — CLINDAMYCIN HYDROCHLORIDE 300 MG: 150 CAPSULE ORAL at 18:21

## 2022-08-25 RX ADMIN — KETOROLAC TROMETHAMINE 30 MG: 30 INJECTION, SOLUTION INTRAMUSCULAR at 18:22

## 2022-08-25 ASSESSMENT — PAIN DESCRIPTION - ORIENTATION
ORIENTATION: LEFT
ORIENTATION: LEFT

## 2022-08-25 ASSESSMENT — PAIN SCALES - GENERAL
PAINLEVEL_OUTOF10: 7
PAINLEVEL_OUTOF10: 7
PAINLEVEL_OUTOF10: 6

## 2022-08-25 ASSESSMENT — PAIN - FUNCTIONAL ASSESSMENT: PAIN_FUNCTIONAL_ASSESSMENT: 0-10

## 2022-08-25 ASSESSMENT — PAIN DESCRIPTION - LOCATION
LOCATION: ARM
LOCATION: ARM

## 2022-08-25 ASSESSMENT — PAIN DESCRIPTION - DESCRIPTORS: DESCRIPTORS: TENDER;SHARP

## 2022-08-25 NOTE — ED TRIAGE NOTES
Pt presents to ED ambulatory via PV with c/o of left underarm abscess. Reports reoccurring abscess in same spot. Reports abscess started x2 days ago and worsened. Resp even and unlabored. A/ox4. Denies any need at this time. Call light within reach. Bed in lowest position. Will continue to monitor.

## 2022-08-25 NOTE — ED NOTES
Pt discharged from ED to home. Pt verbalizes understanding to discharge instructions, teach back successful. Pt denies questions at this time. No acute distress noted. Resp even and unlabored. A/ox4. Pt instructed to follow-up as noted - return to ED if symptoms worsen. Pt verbalizes understanding. Discharged per ED MD with discharge instructions. Pt refuses ambulatory assistance to lobby and walks with steady gait.         Alexandria Lee RN  08/25/22 6712

## 2022-08-25 NOTE — ED PROVIDER NOTES
[facility] ENCOUNTER        Pt Name: Tamar Hall  MRN: [id number]  [place] 1980  Date of evaluation: 8/25/2022  Provider: Sigrid Mitchell PA-C  PCP: [facility]  Note Started: 6:35 PM EDT      SADI. I have evaluated this patient. My supervising physician was available for consultation. Triage CHIEF COMPLAINT       Chief Complaint   Patient presents with    Abscess     Left. X2 days. Hx of in same spot. Underarm. HISTORY OF PRESENT ILLNESS   (Location/Symptom, Timing/Onset, Context/Setting, Quality, Duration, Modifying Factors, Severity)  Note limiting factors. Chief Complaint: Abscess to her left underarm    Tamar Hall is a 39 y.o. female who presents to the emergency department with complaint of abscess to her left underarm that started about 2 days ago. She has a history of hidradenitis suppurativa and she states that this is not new for her. She has been put on doxycycline daily and clindamycin cream daily by her dermatologist for this. She has been taking Tylenol for the pain, but denies taking anything else. She is also been putting warm compresses on the area and she feels as if it is about to drain. She is having difficulty putting her arm down against her body because of the pain. She denies any fever, chills. Nursing Notes were all reviewed and agreed with or any disagreements were addressed in the HPI. REVIEW OF SYSTEMS    (2-9 systems for level 4, 10 or more for level 5)     Review of Systems   Constitutional:  Negative for chills and fever. Skin:  Negative for rash and wound.      PAST MEDICAL HISTORY     Past Medical History:   Diagnosis Date    Arthritis     Asthma     Back pain     Depression     Hyperlipidemia     Neck pain     PMDD (premenstrual dysphoric disorder)     STD (female)        SURGICAL HISTORY     Past Surgical History:   Procedure Laterality Date    APPENDECTOMY CHOLECYSTECTOMY, LAPAROSCOPIC N/A 10/23/2019    ROBOTIC-ASSISTED LAPAROSCOPIC CHOLECYSTECTOMY WITH CHOLANGIOGRAM WITH C-ARM, performed by Ferdie Gilford, MD at [place] [address] (CERVIX STATUS UNKNOWN)      TUBAL LIGATION         CURRENTMEDICATIONS       Previous Medications    ALBUTEROL SULFATE  (90 BASE) MCG/ACT INHALER    Inhale 2 puffs into the lungs    BUPROPION (WELLBUTRIN XL) 150 MG EXTENDED RELEASE TABLET        BUTALBITAL-ASPIRIN-CAFFEINE (FIORINAL) -40 MG PER CAPSULE    Take 1 capsule by mouth every 4 hours as needed for Headaches for up to 9 days. CHOLESTYRAMINE (QUESTRAN) 4 G PACKET    Take 1 packet by mouth 3 times daily    CLINDAMYCIN (CLEOCIN T) 1 % LOTION    APPLY TOPICALLY 2 TIMES A DAY.  APPLY TO ACTIVE NODULES/LESIONS    DOXYCYCLINE HYCLATE 200 MG TBEC    Take 200 mg by mouth daily    IBUPROFEN (ADVIL;MOTRIN) 600 MG TABLET    Take 1 tablet by mouth every 8 hours as needed for Pain    MELOXICAM (MOBIC) 15 MG TABLET    TAKE 1 TABLET BY MOUTH EVERY DAY AS NEEDED FOR PAIN    MOMETASONE (ASMANEX) 110 MCG/INH AEPB    Inhale 2 puffs into the lungs 2 times daily    NAPROXEN (NAPROSYN) 500 MG TABLET    Take 1 tablet by mouth 2 times daily for 20 doses       ALLERGIES     Codeine, Flexeril [cyclobenzaprine], and Tramadol    FAMILYHISTORY       Family History   Problem Relation Age of Onset    High Blood Pressure Mother     High Cholesterol Mother     Diabetes Father         SOCIAL HISTORY       Social History     Socioeconomic History    Marital status: Single     Spouse name: None    Number of children: None    Years of education: None    Highest education level: None   Tobacco Use    Smoking status: Every Day     Packs/day: 0.15     Years: 9.00     Pack years: 1.35     Types: Cigarettes    Smokeless tobacco: Never   Vaping Use    Vaping Use: Never used   Substance and Sexual Activity    Alcohol use: Yes     Comment: occasionally    Drug use: No    Sexual activity: Not Currently       SCREENINGS             PHYSICAL EXAM    (up to 7 for level 4, 8 or more for level 5)     ED Triage Vitals [08/25/22 1745]   BP Temp Temp Source Heart Rate Resp SpO2 Height Weight   (!) 143/72 98 °F (36.7 °C) Oral 89 19 99 % 5' 7\" (1.702 m) 236 lb 12.4 oz (107.4 kg)       Physical Exam  Constitutional:       General: She is not in acute distress. Appearance: Normal appearance. She is not ill-appearing, toxic-appearing or diaphoretic. HENT:      Head: Normocephalic and atraumatic. Right Ear: External ear normal.      Left Ear: External ear normal.      Nose: Nose normal.   Eyes:      General:         Right eye: No discharge. Left eye: No discharge. Pulmonary:      Effort: Pulmonary effort is normal. No respiratory distress. Musculoskeletal:         General: Normal range of motion. Cervical back: Normal range of motion. Skin:     General: Skin is warm and dry. Comments: Patient with fluctuant abscess underneath her left armpit that is more anterior,   Mild surrounding erythema to the area     Neurological:      General: No focal deficit present. Mental Status: She is alert and oriented to person, place, and time. Psychiatric:         Mood and Affect: Mood normal.         Behavior: Behavior normal.       DIAGNOSTIC RESULTS   LABS:    Labs Reviewed - No data to display    When ordered, only abnormal lab results are displayed. All other labs were within normal range or not returned as of this dictation. EKG: When ordered, EKG's are interpreted by the Emergency Department Physician in the absence of a cardiologist.  Please see their note for interpretation of EKG.       RADIOLOGY:   Non-plain film images such as CT, Ultrasound and MRI are read by the radiologist. Plain radiographic images are visualized andpreliminarily interpreted by the  ED Provider with the below findings:        Interpretation [facility] Radiologist below, if available at the time of this note:    No orders to display     No results found. PROCEDURES   Unless otherwise noted below, none     Procedures    CRITICAL CARE TIME   N/A    CONSULTS:  None      EMERGENCY DEPARTMENT COURSE and DIFFERENTIAL DIAGNOSIS/MDM:   Vitals:    Vitals:    08/25/22 1745   BP: (!) 143/72   Pulse: 89   Resp: 19   Temp: 98 °F (36.7 °C)   TempSrc: Oral   SpO2: 99%   Weight: 236 lb 12.4 oz (107.4 kg)   Height: 5' 7\" (1.702 m)       Patient was given thefollowing medications:  Medications   clindamycin (CLEOCIN) capsule 300 mg (300 mg Oral Given 8/25/22 1821)   ketorolac (TORADOL) injection 30 mg (30 mg IntraMUSCular Given 8/25/22 1822)         Is this patient to be included in the SEP-1 Core Measure due to severe sepsis or septic shock? No   Exclusion criteria - the patient is NOT to be included for SEP-1 Core Measure due to:  2+ SIRS criteria are not met    This is a 27-year-old female who presents emergency department with complaint of a abscess likely related to her hidradenitis suppurativa that formed a couple days ago and has become increasingly more painful. Vitals upon arrival show that she is mildly hypertensive, otherwise within normal limits. Afebrile and normal heart rate. Physical exam shows a fluctuant abscess in the left axilla area. I discussed with patient draining the abscess that may give her a significant amount of relief. Patient states that she does not like me giving touching it, and therefore does not want any needles on it either. She states that she would just like antibiotics here. I did give her a prescription for clindamycin 3 times a day for the next 10 days, and discontinuing her doxycycline while she is on the clindamycin. Additionally I did offer her some Toradol. I discussed with her using ibuprofen, Tylenol at home for pain relief. She was agreeable to this plan. Additionally we discussed continuing to use the warm compresses as it may actively drain on its own.   I felt comfortable with this plan as patient did not have any fever, chills and her vitals were normal.  Patient was then discharged in stable condition to follow-up with her dermatologist or primary care physician for further evaluation and return if any new worsening or more concerning symptoms present. I am the Primary Clinician of Record. FINAL IMPRESSION      1. Abscess    2. Hidradenitis suppurativa          DISPOSITION/PLAN   DISPOSITION Decision To Discharge 08/25/2022 06:17:46 PM      PATIENT REFERREDTO:  [facility] [address] [facility]  [address] [place] [phone]    Schedule an appointment as soon as possible for a visit in 1 day  for reevaluation    [place] [address] [address]  Go to   As needed, If symptoms worsen      DISCHARGE MEDICATIONS:  New Prescriptions    CLINDAMYCIN (CLEOCIN) 300 MG CAPSULE    Take 1 capsule by mouth 3 times daily for 10 days       DISCONTINUED MEDICATIONS:  Discontinued Medications    DICYCLOMINE (BENTYL) 10 MG CAPSULE    Take by mouth 3 times daily as needed (bowel spasm)     ONDANSETRON (ZOFRAN ODT) 4 MG DISINTEGRATING TABLET    Take 1 tablet by mouth every 8 hours as needed for Nausea or Vomiting    PAROXETINE (PAXIL) 10 MG TABLET    Take 10 mg by mouth    PSYLLIUM (METAMUCIL) 28.3 % POWD    Take 7.5 g by mouth 2 times daily Please give 660 g or equivalent bottle.   Thanks              (Please note that portions ofthis note were completed with a voice recognition program.  Efforts were made to edit the dictations but occasionally words are mis-transcribed.)    Willian Jackson PA-C (electronically signed)             Willian Jackson PA-C  08/25/22 3861

## 2022-08-25 NOTE — LETTER
[facility] [address]  Phone: [phone]             August 25, 2022    Patient: Matt Pedroza   YOB: 1980   Date of Visit: 8/25/2022       To Whom It May Concern:    Luiza Orozco was seen and treated in our emergency department on 8/25/2022. She may return to work on 8/27/22.       Sincerely,             Signature:__________________________________

## 2022-08-25 NOTE — DISCHARGE INSTRUCTIONS
Stop taking the doxycycline while you are taking the clindamycin, you can continue using your clindamycin cream  Continue with warm compresses  Follow-up with your primary care physician or dermatologist for further evaluation

## 2023-01-25 ENCOUNTER — APPOINTMENT (OUTPATIENT)
Dept: GENERAL RADIOLOGY | Age: 43
End: 2023-01-25
Payer: COMMERCIAL

## 2023-01-25 ENCOUNTER — HOSPITAL ENCOUNTER (EMERGENCY)
Age: 43
Discharge: HOME | End: 2023-01-25
Payer: COMMERCIAL

## 2023-01-25 VITALS
HEIGHT: 67 IN | WEIGHT: 243.39 LBS | OXYGEN SATURATION: 98 % | DIASTOLIC BLOOD PRESSURE: 76 MMHG | SYSTOLIC BLOOD PRESSURE: 130 MMHG | HEART RATE: 86 BPM | BODY MASS INDEX: 38.2 KG/M2 | TEMPERATURE: 98.7 F | RESPIRATION RATE: 19 BRPM

## 2023-01-25 DIAGNOSIS — M54.2 NECK PAIN: Primary | ICD-10-CM

## 2023-01-25 LAB
A/G RATIO: 1 (ref 1.1–2.2)
ALBUMIN SERPL-MCNC: 3.7 G/DL (ref 3.4–5)
ALP BLD-CCNC: 108 U/L (ref 40–129)
ALT SERPL-CCNC: 32 U/L (ref 10–40)
ANION GAP SERPL CALCULATED.3IONS-SCNC: 11 MMOL/L (ref 3–16)
AST SERPL-CCNC: 34 U/L (ref 15–37)
BASOPHILS ABSOLUTE: 0 K/UL (ref 0–0.2)
BASOPHILS RELATIVE PERCENT: 0.5 %
BILIRUB SERPL-MCNC: <0.2 MG/DL (ref 0–1)
BUN BLDV-MCNC: 13 MG/DL (ref 7–20)
CALCIUM SERPL-MCNC: 8.7 MG/DL (ref 8.3–10.6)
CHLORIDE BLD-SCNC: 102 MMOL/L (ref 99–110)
CO2: 23 MMOL/L (ref 21–32)
CREAT SERPL-MCNC: 0.7 MG/DL (ref 0.6–1.1)
EOSINOPHILS ABSOLUTE: 0.2 K/UL (ref 0–0.6)
EOSINOPHILS RELATIVE PERCENT: 2.2 %
GFR SERPL CREATININE-BSD FRML MDRD: >60 ML/MIN/{1.73_M2}
GLUCOSE BLD-MCNC: 106 MG/DL (ref 70–99)
HCT VFR BLD CALC: 35.4 % (ref 36–48)
HEMOGLOBIN: 12 G/DL (ref 12–16)
LYMPHOCYTES ABSOLUTE: 3.6 K/UL (ref 1–5.1)
LYMPHOCYTES RELATIVE PERCENT: 39.9 %
MCH RBC QN AUTO: 31.3 PG (ref 26–34)
MCHC RBC AUTO-ENTMCNC: 33.8 G/DL (ref 31–36)
MCV RBC AUTO: 92.4 FL (ref 80–100)
MONOCYTES ABSOLUTE: 0.5 K/UL (ref 0–1.3)
MONOCYTES RELATIVE PERCENT: 5.9 %
NEUTROPHILS ABSOLUTE: 4.7 K/UL (ref 1.7–7.7)
NEUTROPHILS RELATIVE PERCENT: 51.5 %
PDW BLD-RTO: 13.2 % (ref 12.4–15.4)
PLATELET # BLD: 263 K/UL (ref 135–450)
PMV BLD AUTO: 7.8 FL (ref 5–10.5)
POTASSIUM SERPL-SCNC: 4 MMOL/L (ref 3.5–5.1)
RBC # BLD: 3.83 M/UL (ref 4–5.2)
SODIUM BLD-SCNC: 136 MMOL/L (ref 136–145)
TOTAL PROTEIN: 7.4 G/DL (ref 6.4–8.2)
TROPONIN: <0.01 NG/ML
WBC # BLD: 9.1 K/UL (ref 4–11)

## 2023-01-25 PROCEDURE — 80053 COMPREHEN METABOLIC PANEL: CPT

## 2023-01-25 PROCEDURE — 99285 EMERGENCY DEPT VISIT HI MDM: CPT

## 2023-01-25 PROCEDURE — 85025 COMPLETE CBC W/AUTO DIFF WBC: CPT

## 2023-01-25 PROCEDURE — 84484 ASSAY OF TROPONIN QUANT: CPT

## 2023-01-25 PROCEDURE — 93005 ELECTROCARDIOGRAM TRACING: CPT | Performed by: EMERGENCY MEDICINE

## 2023-01-25 PROCEDURE — 71046 X-RAY EXAM CHEST 2 VIEWS: CPT

## 2023-01-25 PROCEDURE — 36415 COLL VENOUS BLD VENIPUNCTURE: CPT

## 2023-01-25 PROCEDURE — 6370000000 HC RX 637 (ALT 250 FOR IP): Performed by: PHYSICIAN ASSISTANT

## 2023-01-25 PROCEDURE — 96372 THER/PROPH/DIAG INJ SC/IM: CPT

## 2023-01-25 PROCEDURE — 6360000002 HC RX W HCPCS: Performed by: PHYSICIAN ASSISTANT

## 2023-01-25 RX ORDER — LIDOCAINE 50 MG/G
1 PATCH TOPICAL DAILY
Qty: 30 PATCH | Refills: 0 | Status: SHIPPED | OUTPATIENT
Start: 2023-01-25 | End: 2023-02-24

## 2023-01-25 RX ORDER — METHYLPREDNISOLONE 4 MG/1
TABLET ORAL
Qty: 1 KIT | Refills: 0 | Status: SHIPPED | OUTPATIENT
Start: 2023-01-25 | End: 2023-01-31

## 2023-01-25 RX ORDER — ACETAMINOPHEN 500 MG
1000 TABLET ORAL ONCE
Status: COMPLETED | OUTPATIENT
Start: 2023-01-25 | End: 2023-01-25

## 2023-01-25 RX ORDER — KETOROLAC TROMETHAMINE 30 MG/ML
30 INJECTION, SOLUTION INTRAMUSCULAR; INTRAVENOUS ONCE
Status: COMPLETED | OUTPATIENT
Start: 2023-01-25 | End: 2023-01-25

## 2023-01-25 RX ORDER — KETOROLAC TROMETHAMINE 10 MG/1
10 TABLET, FILM COATED ORAL EVERY 6 HOURS PRN
Qty: 20 TABLET | Refills: 0 | Status: SHIPPED | OUTPATIENT
Start: 2023-01-25

## 2023-01-25 RX ADMIN — ACETAMINOPHEN 1000 MG: 500 TABLET ORAL at 17:04

## 2023-01-25 RX ADMIN — KETOROLAC TROMETHAMINE 30 MG: 30 INJECTION, SOLUTION INTRAMUSCULAR at 17:04

## 2023-01-25 ASSESSMENT — ENCOUNTER SYMPTOMS
EYE REDNESS: 0
DIARRHEA: 0
CONSTIPATION: 0
SHORTNESS OF BREATH: 0
ABDOMINAL PAIN: 0
BACK PAIN: 0
NAUSEA: 0
COUGH: 0
VOMITING: 0
EYE PAIN: 0
SORE THROAT: 0

## 2023-01-25 ASSESSMENT — PAIN - FUNCTIONAL ASSESSMENT: PAIN_FUNCTIONAL_ASSESSMENT: NONE - DENIES PAIN

## 2023-01-25 ASSESSMENT — PAIN SCALES - GENERAL: PAINLEVEL_OUTOF10: 0

## 2023-01-25 NOTE — Clinical Note
Km Sunshine was seen and treated in our emergency department on 1/25/2023.  She may return to work on 01/27/2023.       If you have any questions or concerns, please don't hesitate to call.      Eliza Corea PA-C

## 2023-01-25 NOTE — ED NOTES
D/C: Order noted for d/c. Pt confirmed d/c paperwork have correct name. Discharge and education instructions reviewed with patient. Teach-back successful. Pt verbalized understanding and signed d/c papers. Pt denied questions at this time. No acute distress noted. Patient instructed to follow-up as noted - return to emergency department if symptoms worsen. Patient verbalized understanding. Discharged per EDMD with discharge instructions. Pt discharged and ambulated to private vehicle. Patient stable upon departure. Thanked patient for choosing [facility] for care.             Cristy Moreno RN  01/25/23 5076

## 2023-01-25 NOTE — ED PROVIDER NOTES
[address] ENCOUNTER        Pt Name: Jackelyn Bray  MRN: [id number]  [place] 1980  Date of evaluation: 1/25/2023  Provider: mAi Hernández PA-C  PCP: [facility]  Note Started: 4:14 PM EST 1/25/23      SADI. I have evaluated this patient. My supervising physician was available for consultation. CHIEF COMPLAINT       Chief Complaint   Patient presents with    Neck Pain     C/o neck pain, back of head pain and shoulder pain since yesterday       HISTORY OF PRESENT ILLNESS: 1 or more Elements     History from : Patient    Limitations to history : None    Jackelyn Bray is a 43 y.o. female who presents to the emergency department with complaint of left-sided neck pain that has been present since yesterday after she woke up. States that her bed is \"not the greatest \". It progressively worsened throughout the day making it difficult for her to look to her left side. States that the pain has somewhat gone down into her shoulder/shoulder blade. She took Tylenol for it last night and this improved her symptoms enough for her to fall asleep. States that she slept on the couch last night and attempt to make her symptoms better. However when she woke up today she felt like they were worse. The pain has been constant and describes it as an achy pressure like pain. She denies any fevers or chills. She denies any recent illness over the past 1 to 2 weeks. Denies change in her vision, nausea, vomiting, shortness of breath, cough, change in urination or bowel movements. She also states that she does not really have a headache. Nursing Notes were all reviewed and agreed with or any disagreements were addressed in the HPI. REVIEW OF SYSTEMS :      Review of Systems   Constitutional:  Negative for chills and fever. HENT:  Negative for ear pain and sore throat. Eyes:  Negative for pain and redness.    Respiratory:  Negative for cough and shortness of breath. Cardiovascular:  Negative for chest pain and leg swelling. Gastrointestinal:  Negative for abdominal pain, constipation, diarrhea, nausea and vomiting. Genitourinary:  Negative for dysuria and hematuria. Musculoskeletal:  Positive for neck pain. Negative for back pain. Skin:  Negative for rash and wound. Neurological:  Negative for light-headedness and headaches. Positives and Pertinent negatives as per HPI. SURGICAL HISTORY     Past Surgical History:   Procedure Laterality Date    APPENDECTOMY      CHOLECYSTECTOMY, LAPAROSCOPIC N/A 10/23/2019    ROBOTIC-ASSISTED LAPAROSCOPIC CHOLECYSTECTOMY WITH CHOLANGIOGRAM WITH C-ARM, performed by Marley Jackson MD at [address] [address]      [address]       Discharge Medication List as of 1/25/2023  6:06 PM        CONTINUE these medications which have NOT CHANGED    Details   buPROPion (WELLBUTRIN XL) 150 MG extended release tablet Historical Med      clindamycin (CLEOCIN T) 1 % lotion APPLY TOPICALLY 2 TIMES A DAY. APPLY TO ACTIVE NODULES/LESIONS, Historical Med      Doxycycline Hyclate 200 MG TBEC Take 200 mg by mouth dailyHistorical Med      naproxen (NAPROSYN) 500 MG tablet Take 1 tablet by mouth 2 times daily for 20 doses, Disp-20 tablet, R-0Print      butalbital-aspirin-caffeine (FIORINAL) -40 MG per capsule Take 1 capsule by mouth every 4 hours as needed for Headaches for up to 9 days. , Disp-9 capsule, R-3Print      cholestyramine (QUESTRAN) 4 g packet Take 1 packet by mouth 3 times daily, Disp-90 packet, R-3Normal      albuterol sulfate  (90 Base) MCG/ACT inhaler Inhale 2 puffs into the lungsHistorical Med      mometasone (ASMANEX) 110 MCG/INH AEPB Inhale 2 puffs into the lungs 2 times daily, Inhalation, 2 TIMES DAILY, Historical Med             ALLERGIES     Codeine, Flexeril [cyclobenzaprine], and Tramadol    FAMILYHISTORY       Family History   Problem Relation Age of Onset    High Blood Pressure Mother     High Cholesterol Mother     Diabetes Father         SOCIAL HISTORY       Social History     Tobacco Use    Smoking status: Every Day     Packs/day: 0.15     Years: 9.00     Pack years: 1.35     Types: Cigarettes    Smokeless tobacco: Never   Vaping Use    Vaping Use: Never used   Substance Use Topics    Alcohol use: Yes     Comment: occasionally    Drug use: No       SCREENINGS        Ash Coma Scale  Eye Opening: Spontaneous  Best Verbal Response: Oriented  Best Motor Response: Obeys commands  [place] Coma Scale Score: 15                CIWA Assessment  BP: 130/76  Heart Rate: 86           PHYSICAL EXAM  1 or more Elements     ED Triage Vitals [01/25/23 1612]   BP Temp Temp Source Heart Rate Resp SpO2 Height Weight   130/76 98.7 °F (37.1 °C) Oral 86 19 98 % 5' 7\" (1.702 m) 243 lb 6.2 oz (110.4 kg)       Physical Exam  Constitutional:       General: She is not in acute distress. Appearance: Normal appearance. She is not ill-appearing, toxic-appearing or diaphoretic. HENT:      Head: Normocephalic and atraumatic. Right Ear: External ear normal.      Left Ear: External ear normal.      Nose: Nose normal.      Mouth/Throat:      Mouth: Mucous membranes are moist.      Pharynx: Oropharynx is clear. No oropharyngeal exudate. Eyes:      General: No visual field deficit. Right eye: No discharge. Left eye: No discharge. Extraocular Movements: Extraocular movements intact. Pupils: Pupils are equal, round, and reactive to light. Neck:      Comments: Patient is able to move her neck, however it does cause some pain with flexion, extension and lateral rotation to the left side.   She has no pain with rotation to the right side  There is pain with palpation to the base of the neck around C6-C7  No skin changes  Cardiovascular:      Comments: Radial pulses are 2+, intact bilaterally  Normal sensation all dermatomes of bilateral upper extremity  She has no difficulty with movement of her bilateral upper extremity with full active range of motion and strength against resistance  Pulmonary:      Effort: Pulmonary effort is normal. No respiratory distress. Musculoskeletal:         General: Normal range of motion. Skin:     General: Skin is warm and dry. Neurological:      General: No focal deficit present. Mental Status: She is alert and oriented to person, place, and time. Cranial Nerves: No cranial nerve deficit, dysarthria or facial asymmetry. Sensory: Sensation is intact. No sensory deficit. Motor: No weakness, tremor, atrophy or pronator drift. Coordination: Coordination is intact. Comments: No horizontal or vertical nystagmus   Psychiatric:         Mood and Affect: Mood normal.         Behavior: Behavior normal.         DIAGNOSTIC RESULTS   LABS:    Labs Reviewed   CBC WITH AUTO DIFFERENTIAL - Abnormal; Notable for the following components:       Result Value    RBC 3.83 (*)     Hematocrit 35.4 (*)     All other components within normal limits   COMPREHENSIVE METABOLIC PANEL - Abnormal; Notable for the following components:    Glucose 106 (*)     Albumin/Globulin Ratio 1.0 (*)     All other components within normal limits   TROPONIN       When ordered only abnormal lab results are displayed. All other labs were within normal range or not returned as of this dictation. EKG: When ordered, EKG's are interpreted by the Emergency Department Physician in the absence of a cardiologist.  Please see their note for interpretation of EKG.     RADIOLOGY:   Non-plain film images such as CT, Ultrasound and MRI are read by the radiologist. Plain radiographic images are visualized and preliminarily interpreted by the ED Provider with the below findings:      Interpretation per the Radiologist below, if available at the time of this note:    XR CHEST (2 VW)   Final Result   No radiographic evidence of an acute cardiopulmonary process. No results found. No results found. PROCEDURES   Unless otherwise noted below, none     Procedures    CRITICAL CARE TIME (.cctime)       PAST MEDICAL HISTORY      has a past medical history of Arthritis, Asthma, Back pain, Depression, Hyperlipidemia, Neck pain, PMDD (premenstrual dysphoric disorder), and STD (female). Chronic Conditions affecting Care:     EMERGENCY DEPARTMENT COURSE and DIFFERENTIAL DIAGNOSIS/MDM:   Vitals:    Vitals:    01/25/23 1612   BP: 130/76   Pulse: 86   Resp: 19   Temp: 98.7 °F (37.1 °C)   TempSrc: Oral   SpO2: 98%   Weight: 243 lb 6.2 oz (110.4 kg)   Height: 5' 7\" (1.702 m)       Patient was given the following medications:  Medications   ketorolac (TORADOL) injection 30 mg (30 mg IntraMUSCular Given 1/25/23 1704)   acetaminophen (TYLENOL) tablet 1,000 mg (1,000 mg Oral Given 1/25/23 1704)             Is this patient to be included in the SEP-1 Core Measure due to severe sepsis or septic shock? No   Exclusion criteria - the patient is NOT to be included for SEP-1 Core Measure due to: Infection is not suspected    CONSULTS: (Who and What was discussed)  None              CC/HPI Summary, DDx, ED Course, and Reassessment: This is a 43y.o. year old, well-appearing female with  has a past medical history of Arthritis, Asthma, Back pain, Depression, Hyperlipidemia, Neck pain, PMDD (premenstrual dysphoric disorder), and STD (female). who presents to the ED with complaint of neck pain that began 1-2 days ago. Vitals upon arrival show wnl. Physical Exam shows as above.     Blood work was done and shows:   -wnl  EKG: interpreted by my attending, please see note     Imaging was reviewed and interpreted by radiologist as above showing:   -no acute findings    Ddx includes: cervical strain, torticollis, other muscle strain, meningitis, other    Management Given:  -toradol, tylenol, symptoms improved significantly    Disposition: discharge Patient was informed to return to the Emergency Department if any new worsening or more concerning symptoms occur, in agreement with plan. Shared decision making was practiced, and patient discharged in stable condition. Informed to follow up with PCP  for further evaluation. Medications were prescribed as below. All questions were answered. Disposition Considerations (include 1 Tests not done, Shared Decision Making, Pt Expectation of Test or Tx.): Discussed with patient getting a CT scan of her head/neck for further evaluation, but her pain is lower in her upper back, base of neck--will hold off as pain controlled with medication. We also discussed possibility of meningitis and getting CT scan head, lumbar puncture. However we agree this seems more musculoskeletal in nature with her recent bad night of sleep, no other symptoms (fevers, URI, SOB, other) other than the neck pain that improved with medication here. She understands can not completely r/o life threatening diagnoses here and it is important to follow up with her PCP for further evaluation over the next 1-2 days or if symptoms worsen to return. I am the Primary Clinician of Record. FINAL IMPRESSION      1.  Neck pain          DISPOSITION/PLAN     DISPOSITION Decision To Discharge 01/25/2023 06:00:23 PM      PATIENT REFERRED TO:  [facility]  [address] [place] [address]    Schedule an appointment as soon as possible for a visit in 1 day  for reevaluation    [facility] [id number] [phone]  Go to   As needed, If symptoms worsen    DISCHARGE MEDICATIONS:  Discharge Medication List as of 1/25/2023  6:06 PM        START taking these medications    Details   ketorolac (TORADOL) 10 MG tablet Take 1 tablet by mouth every 6 hours as needed for Pain, Disp-20 tablet, R-0Normal      methylPREDNISolone (MEDROL, [place],) 4 MG tablet Take by mouth., Disp-1 kit, R-0Normal      lidocaine (LIDODERM) 5 % Place 1 patch onto the skin daily 12 hours on, 12 hours off., Disp-30 patch, R-0Normal             DISCONTINUED MEDICATIONS:  Discharge Medication List as of 1/25/2023  6:06 PM        STOP taking these medications       ibuprofen (ADVIL;MOTRIN) 600 MG tablet Comments:   Reason for Stopping:                      (Please note that portions of this note were completed with a voice recognition program.  Efforts were made to edit the dictations but occasionally words are mis-transcribed.)    Willian Jackson PA-C (electronically signed)            Willian Jackson PA-C  01/25/23 5655

## 2023-01-26 LAB
EKG ATRIAL RATE: 84 BPM
EKG DIAGNOSIS: NORMAL
EKG P AXIS: 30 DEGREES
EKG P-R INTERVAL: 148 MS
EKG Q-T INTERVAL: 374 MS
EKG QRS DURATION: 82 MS
EKG QTC CALCULATION (BAZETT): 441 MS
EKG R AXIS: -12 DEGREES
EKG T AXIS: 4 DEGREES
EKG VENTRICULAR RATE: 84 BPM

## 2023-01-26 PROCEDURE — 93010 ELECTROCARDIOGRAM REPORT: CPT | Performed by: INTERNAL MEDICINE

## 2023-05-16 ENCOUNTER — HOSPITAL ENCOUNTER (EMERGENCY)
Age: 43
Discharge: HOME | End: 2023-05-16
Attending: EMERGENCY MEDICINE
Payer: COMMERCIAL

## 2023-05-16 ENCOUNTER — APPOINTMENT (OUTPATIENT)
Dept: GENERAL RADIOLOGY | Age: 43
End: 2023-05-16
Payer: COMMERCIAL

## 2023-05-16 VITALS
RESPIRATION RATE: 18 BRPM | WEIGHT: 240 LBS | TEMPERATURE: 98.4 F | HEART RATE: 65 BPM | HEIGHT: 67 IN | SYSTOLIC BLOOD PRESSURE: 146 MMHG | OXYGEN SATURATION: 100 % | DIASTOLIC BLOOD PRESSURE: 98 MMHG | BODY MASS INDEX: 37.67 KG/M2

## 2023-05-16 DIAGNOSIS — S83.91XA SPRAIN OF RIGHT KNEE, UNSPECIFIED LIGAMENT, INITIAL ENCOUNTER: Primary | ICD-10-CM

## 2023-05-16 PROCEDURE — 73562 X-RAY EXAM OF KNEE 3: CPT

## 2023-05-16 PROCEDURE — 99283 EMERGENCY DEPT VISIT LOW MDM: CPT

## 2023-05-16 ASSESSMENT — LIFESTYLE VARIABLES
HOW MANY STANDARD DRINKS CONTAINING ALCOHOL DO YOU HAVE ON A TYPICAL DAY: PATIENT DOES NOT DRINK
HOW OFTEN DO YOU HAVE A DRINK CONTAINING ALCOHOL: NEVER

## 2023-05-16 ASSESSMENT — PAIN SCALES - GENERAL: PAINLEVEL_OUTOF10: 6

## 2023-05-16 ASSESSMENT — PAIN - FUNCTIONAL ASSESSMENT: PAIN_FUNCTIONAL_ASSESSMENT: 0-10

## 2023-05-16 NOTE — ED PROVIDER NOTES
[facility] Emergency Department      CHIEF COMPLAINT  Knee Pain (Pt states that yesterday at her daughters graduation she jumped up onto a wall and landed wrong, she heard her right knee pop and has been in pain/limping ever since. )      HISTORY OF PRESENT ILLNESS  Caroline Rueda is a 43 y.o. female with a history of arthritis presents with right knee pain. She states she tried to jump over a wall this past weekend and when she landed she heard a pop in her right knee and had immediate pain. She caught herself and did not fall to the ground. She did not hit her head. She states since then she is having pain when she bends the knee deeply and when she walks on it. She has been limping. She denies weakness or numbness in the leg. .   No other complaints, modifying factors or associated symptoms. History obtained from the patient. I have reviewed the following from the nursing documentation.     Past Medical History:   Diagnosis Date    Arthritis     Asthma     Back pain     Depression     Hyperlipidemia     Neck pain     PMDD (premenstrual dysphoric disorder)     STD (female)      Past Surgical History:   Procedure Laterality Date    APPENDECTOMY      CHOLECYSTECTOMY, LAPAROSCOPIC N/A 10/23/2019    ROBOTIC-ASSISTED LAPAROSCOPIC CHOLECYSTECTOMY WITH CHOLANGIOGRAM WITH C-ARM, performed by Soraida Bagley MD at [place] [address] ([address] [place] [address])      TUBAL LIGATION       Family History   Problem Relation Age of Onset    High Blood Pressure Mother     High Cholesterol Mother     Diabetes Father      Social History     Socioeconomic History    Marital status: Single     Spouse name: Not on file    Number of children: Not on file    Years of education: Not on file    Highest education level: Not on file   Occupational History    Not on file   Tobacco Use    Smoking status: Every Day     Packs/day: 0.15     Years: 9.00     Pack years: 1.35     Types: Cigarettes    Smokeless

## 2023-05-16 NOTE — DISCHARGE INSTRUCTIONS
Your x-ray is normal.  You have a sprain of your knee. You have been placed in a knee immobilizer and has been given crutches to use. Alternate Tylenol and Motrin for pain. You can apply ice in 20-minute intervals. You have been referred to orthopedic surgery for follow-up. Call them for an appointment.

## 2023-05-16 NOTE — ED TRIAGE NOTES
Pt arrives to the ED with complaints of knee pain, pt states that yesterday at her daughters graduation she jumped up onto a wall and landed wrong, she heard her right knee pop and has been in pain/limping ever since.

## 2023-05-18 ENCOUNTER — TELEPHONE (OUTPATIENT)
Dept: ORTHOPEDIC SURGERY | Age: 43
End: 2023-05-18

## 2023-05-18 ENCOUNTER — OFFICE VISIT (OUTPATIENT)
Dept: ORTHOPEDIC SURGERY | Age: 43
End: 2023-05-18

## 2023-05-18 VITALS — BODY MASS INDEX: 37.67 KG/M2 | WEIGHT: 240 LBS | HEIGHT: 67 IN

## 2023-05-18 DIAGNOSIS — M25.561 ACUTE PAIN OF RIGHT KNEE: ICD-10-CM

## 2023-05-18 DIAGNOSIS — S83.91XA SPRAIN OF RIGHT KNEE, UNSPECIFIED LIGAMENT, INITIAL ENCOUNTER: Primary | ICD-10-CM

## 2023-05-18 DIAGNOSIS — M17.11 PRIMARY OSTEOARTHRITIS OF RIGHT KNEE: ICD-10-CM

## 2023-05-18 NOTE — TELEPHONE ENCOUNTER
Briseyda Mcfarland MA  [facility]    MRI RIGHT KNEE approved Auth# [id number]     Received insurance approval for MRI to be scheduled at [facility]. Patient may call [phone] to schedule. Once scheduled, patient should call our office back to make a follow-up appointment to go over the results. I spoke to pt and let her know.

## 2023-05-18 NOTE — PROGRESS NOTES
Vanessa Long  [id number]  May 18, 2023    Chief Complaint   Patient presents with    Knee Pain     Right       History: The patient is a 60-year-old female who is here for evaluation of her right knee. The patient reportedly was jumping over a wall and she landed awkwardly. She immediately noted right knee pain and swelling. The patient does report having intermittent bilateral knee pain over the past several years. 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 She has been taking Tylenol and ibuprofen. The injury occurred this past Monday. She has been icing the knee is much as possible. The patient did feel a pop when she went over the wall. This is a consult from Yue Whelan MD for right knee pain and swelling. The patient's  past medical history, medications, allergies,  family history, social history, and have been reviewed, and dated and are recorded in the chart. Pertinent items are noted in HPI. Review of systems reviewed from Pertinent History Form dated on 5/18 and available in the patient's chart under the Media tab. Vitals:  Ht 5' 7\" (1.702 m)   Wt 240 lb (108.9 kg)   LMP 11/11/2015 (Approximate)   BMI 37.59 kg/m²     Physical: Ms. Vanessa Long appears well, she is in no apparent distress, she demonstrates appropriate mood & affect. He is alert and oriented to person, place and time. Examination of the right knee reveals medial joint line tenderness. There is a large effusion. Darius's is positive medially. Range of motion is from 0 degrees extension to 110 degrees of flexion. Range of motion of the opposite knee is full. Anterior drawer and Lachman are negative. There is no instability with varus or valgus stressing of the knee. There is no pain with range of motion of the hips. Sensation is intact to light touch in the tibial, peroneal, sural, and saphenous nerve distributions bilaterally.   Both feet are well perfused and sensory is intact to feet equal and

## 2023-06-02 ENCOUNTER — HOSPITAL ENCOUNTER (OUTPATIENT)
Dept: MRI IMAGING | Age: 43
Discharge: HOME | End: 2023-06-02
Payer: COMMERCIAL

## 2023-06-02 DIAGNOSIS — M25.561 ACUTE PAIN OF RIGHT KNEE: ICD-10-CM

## 2023-06-02 PROCEDURE — 73721 MRI JNT OF LWR EXTRE W/O DYE: CPT

## 2023-06-09 ENCOUNTER — OFFICE VISIT (OUTPATIENT)
Dept: ORTHOPEDIC SURGERY | Age: 43
End: 2023-06-09

## 2023-06-09 VITALS — HEIGHT: 67 IN | WEIGHT: 242 LBS | BODY MASS INDEX: 37.98 KG/M2

## 2023-06-09 DIAGNOSIS — M17.11 PRIMARY OSTEOARTHRITIS OF RIGHT KNEE: ICD-10-CM

## 2023-06-09 DIAGNOSIS — S83.241A ACUTE MEDIAL MENISCUS TEAR OF RIGHT KNEE, INITIAL ENCOUNTER: Primary | ICD-10-CM

## 2023-06-09 RX ORDER — IBUPROFEN 800 MG/1
800 TABLET ORAL EVERY 6 HOURS PRN
COMMUNITY

## 2023-06-09 RX ORDER — ACETAMINOPHEN 500 MG
500 TABLET ORAL EVERY 6 HOURS PRN
COMMUNITY

## 2023-06-09 NOTE — PROGRESS NOTES
medial aspect of the patella. X-rays: MRI of the right knee was extensively reviewed. The MRI does confirm evidence of a complex undersurface horizontal cleavage type of tear involving the posterior horn of the medial meniscus. There is diffuse chondromalacia involving the patellofemoral compartment. Impression: #1 right knee meniscal tear  #2 right knee osteoarthritis/patient    Plan: At this time, the patient will be scheduled for a right knee arthroscopy with possible medial meniscus repair versus debridement and chondroplasty. All risks including but not limited to blood loss, infection, persistent pain,stiffness, weakness,deep vein thrombosis,neurovascular injury, and the risks of anesthesia were discussed. The patient understands all risks and benefits of the procedure and agrees to proceed. The patient will see her primary care physician, [facility], for medical clearance. If the patient is on NSAIDS or blood thinners these medications will need to be discontinued one week prior to surgery. No orders of the defined types were placed in this encounter.

## 2023-06-27 ENCOUNTER — OFFICE VISIT (OUTPATIENT)
Dept: ORTHOPEDIC SURGERY | Age: 43
End: 2023-06-27
Payer: COMMERCIAL

## 2023-06-27 VITALS — BODY MASS INDEX: 37.98 KG/M2 | WEIGHT: 242 LBS | HEIGHT: 67 IN

## 2023-06-27 DIAGNOSIS — M17.12 PRIMARY OSTEOARTHRITIS OF LEFT KNEE: Primary | ICD-10-CM

## 2023-06-27 DIAGNOSIS — M25.562 ACUTE PAIN OF LEFT KNEE: ICD-10-CM

## 2023-06-27 PROCEDURE — G8417 CALC BMI ABV UP PARAM F/U: HCPCS | Performed by: ORTHOPAEDIC SURGERY

## 2023-06-27 PROCEDURE — G8428 CUR MEDS NOT DOCUMENT: HCPCS | Performed by: ORTHOPAEDIC SURGERY

## 2023-06-27 PROCEDURE — 99214 OFFICE O/P EST MOD 30 MIN: CPT | Performed by: ORTHOPAEDIC SURGERY

## 2023-06-27 PROCEDURE — 1036F TOBACCO NON-USER: CPT | Performed by: ORTHOPAEDIC SURGERY

## 2023-06-27 RX ORDER — METHYLPREDNISOLONE 4 MG/1
TABLET ORAL
Qty: 1 KIT | Refills: 0 | Status: SHIPPED | OUTPATIENT
Start: 2023-06-27 | End: 2023-07-03

## 2024-06-21 ENCOUNTER — HOSPITAL ENCOUNTER (EMERGENCY)
Age: 44
Discharge: HOME | End: 2024-06-21
Attending: EMERGENCY MEDICINE

## 2024-06-21 ENCOUNTER — APPOINTMENT (OUTPATIENT)
Dept: GENERAL RADIOLOGY | Age: 44
End: 2024-06-21
Attending: EMERGENCY MEDICINE

## 2024-06-21 VITALS
WEIGHT: 246.91 LBS | RESPIRATION RATE: 16 BRPM | SYSTOLIC BLOOD PRESSURE: 124 MMHG | DIASTOLIC BLOOD PRESSURE: 88 MMHG | TEMPERATURE: 97.8 F | HEART RATE: 70 BPM | BODY MASS INDEX: 38.75 KG/M2 | OXYGEN SATURATION: 100 % | HEIGHT: 67 IN

## 2024-06-21 DIAGNOSIS — R09.1 PLEURITIS: Primary | ICD-10-CM

## 2024-06-21 LAB
ALBUMIN SERPL-MCNC: 3.8 G/DL (ref 3.4–5)
ALBUMIN/GLOB SERPL: 1 {RATIO} (ref 1.1–2.2)
ALP SERPL-CCNC: 103 U/L (ref 40–129)
ALT SERPL-CCNC: 14 U/L (ref 10–40)
ANION GAP SERPL CALCULATED.3IONS-SCNC: 11 MMOL/L (ref 3–16)
AST SERPL-CCNC: 18 U/L (ref 15–37)
BASOPHILS # BLD: 0.1 K/UL (ref 0–0.2)
BASOPHILS NFR BLD: 0.6 %
BILIRUB SERPL-MCNC: 0.6 MG/DL (ref 0–1)
BUN SERPL-MCNC: 9 MG/DL (ref 7–20)
CALCIUM SERPL-MCNC: 9 MG/DL (ref 8.3–10.6)
CHLORIDE SERPL-SCNC: 101 MMOL/L (ref 99–110)
CO2 SERPL-SCNC: 24 MMOL/L (ref 21–32)
CREAT SERPL-MCNC: 0.7 MG/DL (ref 0.6–1.1)
D-DIMER QUANTITATIVE: 0.29 UG/ML FEU (ref 0–0.6)
DEPRECATED RDW RBC AUTO: 13.1 % (ref 12.4–15.4)
EKG ATRIAL RATE: 82 BPM
EKG DIAGNOSIS: NORMAL
EKG P AXIS: 44 DEGREES
EKG P-R INTERVAL: 148 MS
EKG Q-T INTERVAL: 382 MS
EKG QRS DURATION: 80 MS
EKG QTC CALCULATION (BAZETT): 446 MS
EKG R AXIS: -11 DEGREES
EKG T AXIS: 14 DEGREES
EKG VENTRICULAR RATE: 82 BPM
EOSINOPHIL # BLD: 0.2 K/UL (ref 0–0.6)
EOSINOPHIL NFR BLD: 2.6 %
GFR SERPLBLD CREATININE-BSD FMLA CKD-EPI: >90 ML/MIN/{1.73_M2}
GLUCOSE SERPL-MCNC: 104 MG/DL (ref 70–99)
HCT VFR BLD AUTO: 35.5 % (ref 36–48)
HGB BLD-MCNC: 12.3 G/DL (ref 12–16)
LYMPHOCYTES # BLD: 3.3 K/UL (ref 1–5.1)
LYMPHOCYTES NFR BLD: 37.2 %
MCH RBC QN AUTO: 31.6 PG (ref 26–34)
MCHC RBC AUTO-ENTMCNC: 34.7 G/DL (ref 31–36)
MCV RBC AUTO: 90.9 FL (ref 80–100)
MONOCYTES # BLD: 0.5 K/UL (ref 0–1.3)
MONOCYTES NFR BLD: 6 %
NEUTROPHILS # BLD: 4.8 K/UL (ref 1.7–7.7)
NEUTROPHILS NFR BLD: 53.6 %
PLATELET # BLD AUTO: 301 K/UL (ref 135–450)
PMV BLD AUTO: 8.3 FL (ref 5–10.5)
POTASSIUM SERPL-SCNC: 4.3 MMOL/L (ref 3.5–5.1)
PROT SERPL-MCNC: 7.8 G/DL (ref 6.4–8.2)
RBC # BLD AUTO: 3.91 M/UL (ref 4–5.2)
SODIUM SERPL-SCNC: 136 MMOL/L (ref 136–145)
TROPONIN, HIGH SENSITIVITY: <6 NG/L (ref 0–14)
WBC # BLD AUTO: 9 K/UL (ref 4–11)

## 2024-06-21 PROCEDURE — 80053 COMPREHEN METABOLIC PANEL: CPT

## 2024-06-21 PROCEDURE — 93005 ELECTROCARDIOGRAM TRACING: CPT | Performed by: EMERGENCY MEDICINE

## 2024-06-21 PROCEDURE — 6360000002 HC RX W HCPCS: Performed by: EMERGENCY MEDICINE

## 2024-06-21 PROCEDURE — 96374 THER/PROPH/DIAG INJ IV PUSH: CPT

## 2024-06-21 PROCEDURE — 84484 ASSAY OF TROPONIN QUANT: CPT

## 2024-06-21 PROCEDURE — 85025 COMPLETE CBC W/AUTO DIFF WBC: CPT

## 2024-06-21 PROCEDURE — 36415 COLL VENOUS BLD VENIPUNCTURE: CPT

## 2024-06-21 PROCEDURE — 71046 X-RAY EXAM CHEST 2 VIEWS: CPT

## 2024-06-21 PROCEDURE — 99285 EMERGENCY DEPT VISIT HI MDM: CPT

## 2024-06-21 PROCEDURE — 85379 FIBRIN DEGRADATION QUANT: CPT

## 2024-06-21 PROCEDURE — 93010 ELECTROCARDIOGRAM REPORT: CPT | Performed by: INTERNAL MEDICINE

## 2024-06-21 RX ORDER — KETOROLAC TROMETHAMINE 30 MG/ML
30 INJECTION, SOLUTION INTRAMUSCULAR; INTRAVENOUS ONCE
Status: COMPLETED | OUTPATIENT
Start: 2024-06-21 | End: 2024-06-21

## 2024-06-21 RX ADMIN — KETOROLAC TROMETHAMINE 30 MG: 30 INJECTION, SOLUTION INTRAMUSCULAR at 13:18

## 2024-06-21 ASSESSMENT — LIFESTYLE VARIABLES
HOW MANY STANDARD DRINKS CONTAINING ALCOHOL DO YOU HAVE ON A TYPICAL DAY: 1 OR 2
HOW OFTEN DO YOU HAVE A DRINK CONTAINING ALCOHOL: MONTHLY OR LESS

## 2024-06-21 ASSESSMENT — PAIN DESCRIPTION - PAIN TYPE: TYPE: ACUTE PAIN

## 2024-06-21 ASSESSMENT — PAIN SCALES - GENERAL
PAINLEVEL_OUTOF10: 3
PAINLEVEL_OUTOF10: 3

## 2024-06-21 ASSESSMENT — PAIN DESCRIPTION - FREQUENCY: FREQUENCY: INTERMITTENT

## 2024-06-21 ASSESSMENT — PAIN DESCRIPTION - DESCRIPTORS: DESCRIPTORS: SHARP

## 2024-06-21 ASSESSMENT — PAIN DESCRIPTION - ORIENTATION: ORIENTATION: LEFT

## 2024-06-21 ASSESSMENT — PAIN DESCRIPTION - LOCATION: LOCATION: CHEST

## 2024-06-21 ASSESSMENT — PAIN - FUNCTIONAL ASSESSMENT: PAIN_FUNCTIONAL_ASSESSMENT: 0-10

## 2024-06-21 NOTE — ED TRIAGE NOTES
Pt arrives ambulatory for eval of left side chest pain onset 1130am while driving, denies cardiac hx. Pt sts slight sob, denies nausea, denies diaphoresis. Pt sts sharp pain mainly with movement. Pt is a/ox4, rsp nonlabored and skin race appropriate, warm and dry.

## 2024-06-21 NOTE — ED PROVIDER NOTES
DDx, ED Course, and Reassessment:   The differential diagnosis on presentation given the patient's history of pleuritic chest pain is pulmonary embolism, versus pleuritis, and much less frequent would be pericarditis.  There were no findings of pericarditis on the patient's EKG.  I felt this was not an ACS and therefore a single troponin will suffice in ruling out any possible ACS.  A D-dimer was obtained to rule out a PE and it was negative.  The patient's chest x-ray was negative effectively ruling out pneumothorax or pleural effusions pneumonia etc.  The patient was treated with Toradol 30 mg IV and was markedly improved       Patient was given the following medications:   Medications   ketorolac (TORADOL) injection 30 mg (30 mg IntraVENous Given 6/21/24 1318)        CONSULTS:   None   Discussion with Other Professionals: None     {Social Determinants: None     Chronic Conditions:  None      Records Reviewed: NA        Disposition Considerations:      I am the Primary Clinician of Record.        FINAL IMPRESSION    1. Pleuritis           DISPOSITION/PLAN:               Pt discharged home in stable condition.     PATIENT REFERRED TO:   No follow-up provider specified.     DISCHARGE MEDICATIONS:   New Prescriptions    No medications on file        DISCONTINUED MEDICATIONS:   Discontinued Medications    No medications on file              (Please note that portions of this note were completed with a voice recognition program.  Efforts were made to edit the dictations but occasionally words are mis-transcribed.)       Francisco Maxwell MD (electronically signed)             Francisco Maxwell MD  06/21/24 4081

## 2024-06-21 NOTE — DISCHARGE INSTRUCTIONS
1.  Motrin 800 mg every 8 hours, or Aleve 2 every 12 hours.  You can also add Tylenol Extra Strength 2 every 4 hours in between doses of either of the above medications  2.  Follow-up with your primary care provider as needed

## (undated) DEVICE — SYRINGE, LUER LOCK, 10ML: Brand: MEDLINE

## (undated) DEVICE — GLOVE SURG SZ 75 L12IN FNGR THK87MIL WHT LTX FREE

## (undated) DEVICE — TROCAR ENDOSCP L100MM DIA12MM BLDELSS OBT RADLUC STBL SL

## (undated) DEVICE — CANNULA SEAL

## (undated) DEVICE — BLADELESS OBTURATOR: Brand: WECK VISTA

## (undated) DEVICE — GOWN,AURORA,NONREINF,RAGLAN,XXL,STERILE: Brand: MEDLINE

## (undated) DEVICE — 3M™ IOBAN™ 2 ANTIMICROBIAL INCISE DRAPE 6650EZ: Brand: IOBAN™ 2

## (undated) DEVICE — SUTURE MCRYL SZ 4-0 L27IN ABSRB UD L19MM PS-2 1/2 CIR PRIM Y426H

## (undated) DEVICE — TROCAR: Brand: KII SHIELDED BLADED ACCESS SYSTEM

## (undated) DEVICE — NEEDLE HYPO 25GA L1.5IN BLU POLYPR HUB S STL REG BVL STR

## (undated) DEVICE — ELECTRODE PT RET AD L9FT HI MOIST COND ADH HYDRGEL CORDED

## (undated) DEVICE — LAPAROSCOPY PK

## (undated) DEVICE — ELECTRO LUBE IS A SINGLE PATIENT USE DEVICE THAT IS INTENDED TO BE USED ON ELECTROSURGICAL ELECTRODES TO REDUCE STICKING.: Brand: KEY SURGICAL ELECTRO LUBE

## (undated) DEVICE — CHLORAPREP 26ML ORANGE

## (undated) DEVICE — ARM DRAPE

## (undated) DEVICE — KIT OR ROOM TURNOVER W/STRAP

## (undated) DEVICE — INSUFFLATION NEEDLE TO ESTABLISH PNEUMOPERITONEUM.: Brand: INSUFFLATION NEEDLE

## (undated) DEVICE — DRAPE,LAP,CHOLE,W/TROUGHS,STERILE: Brand: MEDLINE

## (undated) DEVICE — CATHETER CHOLANGIOGRAM 4.5 FRX3 IN W/ 20018M55 TAUT INTRO

## (undated) DEVICE — SOLUTION ANTIFOG VIS SYS CLEARIFY LAPSCP

## (undated) DEVICE — NEEDLE HYPO 22GA L1.5IN BLK POLYPR HUB S STL REG BVL STR

## (undated) DEVICE — TISSUE RETRIEVAL SYSTEM: Brand: INZII RETRIEVAL SYSTEM

## (undated) DEVICE — GLOVE SURG SZ 8 L12IN FNGR THK79MIL GRN LTX FREE

## (undated) DEVICE — SUTURE SZ 0 27IN 5/8 CIR UR-6  TAPER PT VIOLET ABSRB VICRYL J603H

## (undated) DEVICE — STERILE POLYISOPRENE POWDER-FREE SURGICAL GLOVES: Brand: PROTEXIS

## (undated) DEVICE — SYRINGE MED 30ML STD CLR PLAS LUERSLIP TIP N CTRL DISP